# Patient Record
Sex: FEMALE | Race: BLACK OR AFRICAN AMERICAN | Employment: UNEMPLOYED | URBAN - METROPOLITAN AREA
[De-identification: names, ages, dates, MRNs, and addresses within clinical notes are randomized per-mention and may not be internally consistent; named-entity substitution may affect disease eponyms.]

---

## 2017-01-26 ENCOUNTER — OFFICE VISIT (OUTPATIENT)
Dept: INTERNAL MEDICINE CLINIC | Facility: CLINIC | Age: 57
End: 2017-01-26

## 2017-01-26 VITALS
RESPIRATION RATE: 18 BRPM | WEIGHT: 183.3 LBS | HEART RATE: 88 BPM | OXYGEN SATURATION: 98 % | HEIGHT: 68 IN | SYSTOLIC BLOOD PRESSURE: 108 MMHG | DIASTOLIC BLOOD PRESSURE: 77 MMHG | TEMPERATURE: 98.4 F | BODY MASS INDEX: 27.78 KG/M2

## 2017-01-26 DIAGNOSIS — E66.3 OVERWEIGHT (BMI 25.0-29.9): ICD-10-CM

## 2017-01-26 DIAGNOSIS — Z11.59 NEED FOR HEPATITIS C SCREENING TEST: ICD-10-CM

## 2017-01-26 DIAGNOSIS — E78.00 HYPERCHOLESTEROLEMIA: ICD-10-CM

## 2017-01-26 DIAGNOSIS — I10 ESSENTIAL HYPERTENSION WITH GOAL BLOOD PRESSURE LESS THAN 140/90: Primary | ICD-10-CM

## 2017-01-26 DIAGNOSIS — D86.9 SARCOID: ICD-10-CM

## 2017-01-26 DIAGNOSIS — Z23 ENCOUNTER FOR IMMUNIZATION: ICD-10-CM

## 2017-01-26 NOTE — PROGRESS NOTES
Gabriella Thompson  is a 64 y.o.  female  who present for TDAP immunizations/injections. He/she denies any symptoms , reactions or allergies that would exclude them from being immunized today. Risks and adverse reactions were discussed and the VIS was given if applicable to them. All questions were addressed. He/She was observed for 5 min post injection. There were no reactions observed.     Behzad Whelan LPN

## 2017-01-26 NOTE — MR AVS SNAPSHOT
Visit Information Date & Time Provider Department Dept. Phone Encounter #  
 1/26/2017 10:30 AM María Quick PA-C [facility] [phone] [id number] Follow-up Instructions Return in about 6 months (around 7/26/2017) for Hypertension, Weight follow up. Upcoming Health Maintenance Date Due Hepatitis C Screening 1960 DTaP/Tdap/Td series (1 - Tdap) 4/27/1981 BREAST CANCER SCRN MAMMOGRAM 4/27/2010 FOBT Q 1 YEAR AGE 50-75 4/27/2010 PAP AKA CERVICAL CYTOLOGY 12/8/2019 Allergies as of 1/26/2017  Review Complete On: 1/26/2017 By: María Quick PA-C Severity Noted Reaction Type Reactions Latex  06/21/2010    Rash Iodine  06/21/2010    Rash Lisinopril  08/09/2016    Cough Current Immunizations  Never Reviewed Name Date Tdap  Incomplete Not reviewed this visit You Were Diagnosed With   
  
 Codes Comments Essential hypertension with goal blood pressure less than 140/90    -  Primary ICD-10-CM: I10 
ICD-9-CM: 401.9 Sarcoid ([address]     ICD-10-CM: D86.9 ICD-9-CM: 135 Hypercholesterolemia     ICD-10-CM: E78.00 ICD-9-CM: 272.0 Overweight (BMI 25.0-29. 9)     ICD-10-CM: D79.5 ICD-9-CM: 278.02 Need for hepatitis C screening test     ICD-10-CM: Z11.59 
ICD-9-CM: V73.89 Encounter for immunization     ICD-10-CM: U71 ICD-9-CM: V03.89 Vitals BP Pulse Temp Resp Height(growth percentile) Weight(growth percentile) 108/77 (BP 1 Location: Left arm, BP Patient Position: Sitting) 88 98.4 °F (36.9 °C) (Oral) 18 5' 8\" (1.727 m) 183 lb 4.8 oz (83.1 kg) SpO2 BMI OB Status Smoking Status 98% 27.87 kg/m2 Hysterectomy Never Smoker Vitals History BMI and BSA Data Body Mass Index Body Surface Area  
 27.87 kg/m 2 2 m 2 Preferred Pharmacy Pharmacy Name Phone [id number] [place], [place] - [address] [facility] Yimi Lewis [phone] Your Updated Medication List  
  
   
This list is accurate as of: 1/26/17 10:40 AM.  Always use your most recent med list.  
  
  
  
  
 albuterol 90 mcg/actuation inhaler Commonly known as:  PROVENTIL HFA, VENTOLIN HFA, PROAIR HFA Take  by inhalation. amLODIPine 5 mg tablet Commonly known as:  Bk Abrams Take 1 Tab by mouth daily. Indications: Hypertension CENTRUM SILVER PO Take  by mouth. * Cetirizine 10 mg Cap Take  by mouth. * ZyrTEC 10 mg Cap Generic drug:  Cetirizine Take  by mouth. cholecalciferol (VITAMIN D3) 5,000 unit Tab tablet Commonly known as:  VITAMIN D3 Take  by mouth daily. dextroamphetamine-amphetamine 20 mg tablet Commonly known as:  ADDERALL Take 20 mg by mouth two (2) times a day. meloxicam 15 mg tablet Commonly known as:  MOBIC  
  
 OMEGA 3 PO Take  by mouth. OTHER  
400 mg. Indications: magnesium complex * Notice: This list has 2 medication(s) that are the same as other medications prescribed for you. Read the directions carefully, and ask your doctor or other care provider to review them with you. We Performed the Following HCV AB W/RFLX TO ROGELIO [62490 CPT(R)] LIPID PANEL [30495 CPT(R)] METABOLIC PANEL, COMPREHENSIVE [71861 CPT(R)] REFERRAL TO PULMONARY DISEASE [YVO59 Custom] Comments:  
 Evangelina Glez MD 
[facility] [address], [address] [place] [address] Road Phone: [phone] Fax: [phone] TETANUS, DIPHTHERIA TOXOIDS AND ACELLULAR PERTUSSIS VACCINE (TDAP), IN INDIVIDS. >=7, IM [id number] CPT(R)] THYROID CASCADE PROFILE [ZVW04119 Custom] Follow-up Instructions Return in about 6 months (around 7/26/2017) for Hypertension, Weight follow up. Referral Information  Referral ID Referred By Referred To  
  
 [id number] Esperanza MENJIVAR [facility].   
 [address] [facility] 101 [facility], 40 [facility] Visits Status Start Date End Date 1 New Request 1/26/17 1/26/18 If your referral has a status of pending review or denied, additional information will be sent to support the outcome of this decision. Introducing [facility]! John Saucedo introduces Stem patient portal. Now you can access parts of your medical record, email your doctor's office, and request medication refills online. 1. In your internet browser, go to [email or web address] 2. Click on the First Time User? Click Here link in the Sign In box. You will see the New Member Sign Up page. 3. Enter your Stem Access Code exactly as it appears below. You will not need to use this code after youve completed the sign-up process. If you do not sign up before the expiration date, you must request a new code. · Stem Access Code: [email or web address] Expires: 2/18/2017  3:36 PM 
 
4. Enter the last four digits of your Social Security Number (xxxx) and Date of Birth (mm/dd/yyyy) as indicated and click Submit. You will be taken to the next sign-up page. 5. Create a Stem ID. This will be your Stem login ID and cannot be changed, so think of one that is secure and easy to remember. 6. Create a Stem password. You can change your password at any time. 7. Enter your Password Reset Question and Answer. This can be used at a later time if you forget your password. 8. Enter your e-mail address. You will receive e-mail notification when new information is available in [address] 9. Click Sign Up. You can now view and download portions of your medical record. 10. Click the Download Summary menu link to download a portable copy of your medical information. If you have questions, please visit the Frequently Asked Questions section of the Stem website. Remember, Stem is NOT to be used for urgent needs. For medical emergencies, dial 911. Now available from your iPhone and Android! Please provide this summary of care documentation to your next provider. Your primary care clinician is listed as Lucia Bateman. If you have any questions after today's visit, please call [phone].

## 2017-01-26 NOTE — PROGRESS NOTES
HISTORY OF PRESENT ILLNESS  Jenni Osborne is a 64 y.o. female. HPI   1) HTN - taking Amlodipine regularly. Has lost weight! Working on diet - trying to decrease calories in diet. Wt Readings from Last 3 Encounters:   01/26/17 183 lb 4.8 oz (83.1 kg)   12/08/16 188 lb (85.3 kg)   11/22/16 184 lb (83.5 kg)     Trying to decrease stress. Hip pain has improved after the injection 12/16. Sleeping on a heating pad most nights since 11/16. Ortho has referred pt to PT - pt has been doing exercises (most days) at home instead as this is more convenient, and they seem to help. Taking meloxicam for back pain and it helps. Checking BP at home occasionally. Always below 130/80    2) Trying to qualify for Federal Disability because of Sarcoidosis and ADD. Seeing Psychiatrist for ADD. Not seeing Pulmonology for Sarcoidosis, and has not had routine testing in years. Review of Systems   Constitutional: Negative for malaise/fatigue. Cardiovascular: Negative for chest pain and palpitations. Neurological: Positive for dizziness. Negative for loss of consciousness. Once this week when standing too quickly. Physical Exam   Constitutional: She is oriented to person, place, and time. She appears well-developed and well-nourished. No distress. HENT:   Head: Normocephalic and atraumatic. Neck: Neck supple. No JVD present. Cardiovascular: Normal rate, regular rhythm and normal heart sounds. Pulmonary/Chest: Effort normal and breath sounds normal. No respiratory distress. Musculoskeletal: She exhibits no edema. Neurological: She is alert and oriented to person, place, and time. Skin: Skin is warm and dry. Psychiatric: She has a normal mood and affect. Her behavior is normal. Judgment and thought content normal.   Nursing note and vitals reviewed. ASSESSMENT and PLAN    ICD-10-CM ICD-9-CM    1. Essential hypertension with goal blood pressure less than 140/90 I10 401.9 Controlled!  Continue Amlodipine. We may be able to stop Amlodipine if pt continues losing weight. Advised her to call if dizzy episodes become frequent. METABOLIC PANEL, COMPREHENSIVE   2. Sarcoid ([address] D86.9 135 REFERRAL TO PULMONARY DISEASE   3. Hypercholesterolemia E78.00 272.0 Pt doesn't think she would ever agree to take a statin, but will think about it, as we await lab results. LIPID PANEL      THYROID CASCADE PROFILE   4. Overweight (BMI 25.0-29. 9) E66.3 278.02 Improved! 5. Need for hepatitis C screening test Z11.59 V73.89 HCV AB W/RFLX TO ROGELIO   6.  Encounter for immunization Z23 V03.89 TETANUS, DIPHTHERIA TOXOIDS AND ACELLULAR PERTUSSIS VACCINE (TDAP), IN INDIVIDS. >=7, IM

## 2017-01-26 NOTE — PROGRESS NOTES
Room 7    Chief Complaint   Patient presents with    Blood Pressure Check     To follow up with blood pressure medication and back pain     1. Have you been to the ER, urgent care clinic since your last visit? Hospitalized since your last visit? No    2. Have you seen or consulted any other health care providers outside of the [address] [place] [address] since your last visit? Include any pap smears or colon screening. No     Health Maintenance Due   Topic Date Due    Hepatitis C Screening  1960    DTaP/Tdap/Td series (1 - Tdap) 04/27/1981    BREAST CANCER SCRN MAMMOGRAM  04/27/2010    FOBT Q 1 YEAR AGE 50-75  04/27/2010     Advance directives reviewed.  Patient received information previously

## 2017-01-27 LAB
ALBUMIN SERPL-MCNC: 4.8 G/DL (ref 3.5–5.5)
ALBUMIN/GLOB SERPL: 1.8 {RATIO} (ref 1.1–2.5)
ALP SERPL-CCNC: 110 IU/L (ref 39–117)
ALT SERPL-CCNC: 8 IU/L (ref 0–32)
AST SERPL-CCNC: 19 IU/L (ref 0–40)
BILIRUB SERPL-MCNC: 0.5 MG/DL (ref 0–1.2)
BUN SERPL-MCNC: 17 MG/DL (ref 6–24)
BUN/CREAT SERPL: 20 (ref 9–23)
CALCIUM SERPL-MCNC: 9.8 MG/DL (ref 8.7–10.2)
CHLORIDE SERPL-SCNC: 96 MMOL/L (ref 96–106)
CHOLEST SERPL-MCNC: 249 MG/DL (ref 100–199)
CO2 SERPL-SCNC: 25 MMOL/L (ref 18–29)
CREAT SERPL-MCNC: 0.85 MG/DL (ref 0.57–1)
GLOBULIN SER CALC-MCNC: 2.6 G/DL (ref 1.5–4.5)
GLUCOSE SERPL-MCNC: 62 MG/DL (ref 65–99)
HCV AB S/CO SERPL IA: <0.1 S/CO RATIO (ref 0–0.9)
HCV AB SERPL QL IA: NORMAL
HDLC SERPL-MCNC: 53 MG/DL
LDLC SERPL CALC-MCNC: 170 MG/DL (ref 0–99)
POTASSIUM SERPL-SCNC: 4.8 MMOL/L (ref 3.5–5.2)
PROT SERPL-MCNC: 7.4 G/DL (ref 6–8.5)
SODIUM SERPL-SCNC: 139 MMOL/L (ref 134–144)
TRIGL SERPL-MCNC: 131 MG/DL (ref 0–149)
TSH SERPL DL<=0.005 MIU/L-ACNC: 0.53 UIU/ML (ref 0.45–4.5)
VLDLC SERPL CALC-MCNC: 26 MG/DL (ref 5–40)

## 2017-01-30 DIAGNOSIS — E78.00 HYPERCHOLESTEROLEMIA: Primary | ICD-10-CM

## 2017-01-30 RX ORDER — SIMVASTATIN 20 MG/1
20 TABLET, FILM COATED ORAL
Qty: 30 TAB | Refills: 2 | Status: SHIPPED | OUTPATIENT
Start: 2017-01-30 | End: 2017-05-04 | Stop reason: SDUPTHER

## 2017-04-20 ENCOUNTER — OFFICE VISIT (OUTPATIENT)
Dept: INTERNAL MEDICINE CLINIC | Facility: CLINIC | Age: 57
End: 2017-04-20

## 2017-04-20 VITALS
SYSTOLIC BLOOD PRESSURE: 141 MMHG | HEART RATE: 62 BPM | HEIGHT: 68 IN | BODY MASS INDEX: 27.58 KG/M2 | OXYGEN SATURATION: 100 % | WEIGHT: 182 LBS | TEMPERATURE: 97.6 F | DIASTOLIC BLOOD PRESSURE: 83 MMHG | RESPIRATION RATE: 18 BRPM

## 2017-04-20 DIAGNOSIS — Z12.11 SCREEN FOR COLON CANCER: ICD-10-CM

## 2017-04-20 DIAGNOSIS — Z00.00 ANNUAL PHYSICAL EXAM: Primary | ICD-10-CM

## 2017-04-20 DIAGNOSIS — E78.00 HYPERCHOLESTEROLEMIA: ICD-10-CM

## 2017-04-20 DIAGNOSIS — I10 ESSENTIAL HYPERTENSION WITH GOAL BLOOD PRESSURE LESS THAN 140/90: ICD-10-CM

## 2017-04-20 NOTE — PROGRESS NOTES
HISTORY OF PRESENT ILLNESS  Sergio Hays is a 64 y.o. female. HPI   1) CE today - has lost weight. Getting more steps in and stretching regularly. Wt Readings from Last 3 Encounters:   04/20/17 182 lb (82.6 kg)   01/26/17 183 lb 4.8 oz (83.1 kg)   12/08/16 188 lb (85.3 kg)     2) Hypertension - Didn't take BP medication this morning. BP at home 130-138/78-80. Sometimes BP goes up when pt's hip pain worsens: 142/86. Missing BP medication less than 1x/week. BP Readings from Last 3 Encounters:   04/20/17 141/83   01/26/17 108/77   12/08/16 138/84     3) Hyperchol - Started taking statin, but not consistently because night time dosing is not convenient. Lab Results   Component Value Date/Time    Cholesterol, total 249 01/26/2017 10:29 AM    HDL Cholesterol 53 01/26/2017 10:29 AM    LDL, calculated 170 01/26/2017 10:29 AM    VLDL, calculated 26 01/26/2017 10:29 AM    Triglyceride 131 01/26/2017 10:29 AM       Review of Systems   Constitutional: Negative for fever and weight loss. HENT: Positive for congestion. Negative for hearing loss and sore throat. Eyes: Positive for discharge. Negative for blurred vision. Respiratory: Positive for cough and shortness of breath. Cough/SOB - hx sarcoidosis - doesn't want to have this evaluated with pulm. Cardiovascular: Negative for chest pain, palpitations and leg swelling. Gastrointestinal: Positive for abdominal pain. Negative for blood in stool, constipation, diarrhea, heartburn, melena, nausea and vomiting. Stomach ache after eating too much sugar this week. Genitourinary: Negative for dysuria, frequency, hematuria and urgency. Musculoskeletal: Positive for back pain and joint pain. Negative for neck pain. Neurological: Positive for dizziness. Negative for seizures, loss of consciousness, weakness and headaches. Mildly dizzy once in the past 1-2 months lasting 1-2 seconds.     Endo/Heme/Allergies: Positive for environmental allergies. Psychiatric/Behavioral: Positive for depression. Negative for substance abuse and suicidal ideas. The patient is nervous/anxious. Physical Exam   Constitutional: She is oriented to person, place, and time. She appears well-developed and well-nourished. No distress. HENT:   Head: Normocephalic and atraumatic. Right Ear: External ear normal.   Left Ear: External ear normal.   Nose: Nose normal.   Mouth/Throat: Oropharynx is clear and moist.   Eyes: Conjunctivae are normal.   Neck: Neck supple. No JVD present. No thyromegaly present. Cardiovascular: Normal rate, regular rhythm and normal heart sounds. Pulmonary/Chest: Effort normal and breath sounds normal. No respiratory distress. Breast symmetrical B without mass, tenderness, or discharge. No lymph enlargement in B underarms. Abdominal: Soft. Bowel sounds are normal. She exhibits no distension and no mass. There is no tenderness. There is no rebound and no guarding. Genitourinary:   Genitourinary Comments: No hemorrhoids or rectal masses. Stool is brown. Rectal tone WNL. FOBT sent. Musculoskeletal: She exhibits no edema. Lymphadenopathy:     She has no cervical adenopathy. Neurological: She is alert and oriented to person, place, and time. Skin: Skin is warm and dry. Psychiatric: She has a normal mood and affect. Her behavior is normal. Judgment and thought content normal.   Nursing note and vitals reviewed. ASSESSMENT and PLAN    ICD-10-CM ICD-9-CM    1. Annual physical exam Z00.00 V70.0 Congratulated pt on weight loss! 2. Screen for colon cancer Z12.11 V76.51 REFERRAL TO GASTROENTEROLOGY      OCCULT BLOOD, IMMUNOASSAY (FIT)   3. Hypercholesterolemia E78.00 272.0 Follow up with recheck in 3 months after taking statin consistently. 4. Essential hypertension with goal blood pressure less than 140/90 I10 401.9 Well controlled at home. Monitor regularly and follow up in 3 months.  Emphasized importance of taking BP medication before coming in to the office. Pt notes understanding.

## 2017-04-20 NOTE — ACP (ADVANCE CARE PLANNING)
Advance Care Planning (ACP) Provider Note - Comprehensive     Date of ACP Conversation: 04/20/17  Persons included in Conversation:  patient  Length of ACP Conversation in minutes:  <16 minutes (Non-Billable)    Authorized Decision Maker (if patient is incapable of making informed decisions): This person is:  Rachid Amin Rashad           General ACP for ALL Patients with Decision Making Capacity:   Importance of advance care planning, including choosing a healthcare agent to communicate patient's healthcare decisions if patient lost the ability to make decisions, such as after a sudden illness or accident  Understanding of the healthcare agent role was assessed and information provided  Exploration of values, goals, and preferences if recovery is not expected, even with continued medical treatment in the event of: Imminent death  Severe, permanent brain injury  \"In these circumstances, what matters most to you? \"  Care focused more on comfort or quality of life. Review of Existing Advance Directive:  What is your understanding of your agent's willingness to honor your wishes, even if he/she may not agree with them? Pt believes that her son would honor her wishes. For Serious or Chronic Illness:  Understanding of CPR, goals and expected outcomes, benefits and burdens discussed.     Interventions Provided:  Recommended completion of Advance Directive form after review of ACP materials and conversation with prospective healthcare agent

## 2017-04-20 NOTE — MR AVS SNAPSHOT
Visit Information Date & Time Provider Department Dept. Phone Encounter #  
 4/20/2017  9:30 AM Elizabet Mendieta PA-C [facility] [phone] [id number] Follow-up Instructions Return in about 3 months (around 7/20/2017) for Follow up Cholesterol & HTN. Also schedule ACP with Juanita. Upcoming Health Maintenance Date Due  
 BREAST CANCER SCRN MAMMOGRAM 4/27/2010 FOBT Q 1 YEAR AGE 50-75 4/27/2010 PAP AKA CERVICAL CYTOLOGY 12/8/2019 DTaP/Tdap/Td series (2 - Td) 1/26/2027 Allergies as of 4/20/2017  Review Complete On: 4/20/2017 By: Elizabet Mendieta PA-C Severity Noted Reaction Type Reactions Latex  06/21/2010    Rash Iodine  06/21/2010    Rash Lisinopril  08/09/2016    Cough Current Immunizations  Never Reviewed Name Date Tdap 1/26/2017 Not reviewed this visit You Were Diagnosed With   
  
 Codes Comments Annual physical exam    -  Primary ICD-10-CM: Z00.00 ICD-9-CM: V70.0 Screen for colon cancer     ICD-10-CM: Z12.11 ICD-9-CM: V76.51 Hypercholesterolemia     ICD-10-CM: E78.00 ICD-9-CM: 272.0 Essential hypertension with goal blood pressure less than 140/90     ICD-10-CM: I10 
ICD-9-CM: 401.9 Vitals BP Pulse Temp Resp Height(growth percentile) Weight(growth percentile) 141/83 (BP 1 Location: Left arm, BP Patient Position: Sitting) 62 97.6 °F (36.4 °C) (Oral) 18 5' 8\" (1.727 m) 182 lb (82.6 kg) SpO2 BMI OB Status Smoking Status 100% 27.67 kg/m2 Hysterectomy Never Smoker Vitals History BMI and BSA Data Body Mass Index Body Surface Area  
 27.67 kg/m 2 1.99 m 2 Preferred Pharmacy Pharmacy Name Phone [id number], [place] - [address] [phone] Your Updated Medication List  
  
   
This list is accurate as of: 4/20/17 10:16 AM.  Always use your most recent med list.  
  
  
  
  
 albuterol 90 mcg/actuation inhaler Commonly known as:  PROVENTIL HFA, VENTOLIN HFA, PROAIR HFA Take  by inhalation. amLODIPine 5 mg tablet Commonly known as:  Benjiman Floro Take 1 Tab by mouth daily. Indications: Hypertension CENTRUM SILVER PO Take  by mouth. * Cetirizine 10 mg Cap Take  by mouth. * ZyrTEC 10 mg Cap Generic drug:  Cetirizine Take  by mouth. cholecalciferol (VITAMIN D3) 5,000 unit Tab tablet Commonly known as:  VITAMIN D3 Take  by mouth daily. dextroamphetamine-amphetamine 20 mg tablet Commonly known as:  ADDERALL Take 20 mg by mouth two (2) times a day. meloxicam 15 mg tablet Commonly known as:  MOBIC  
  
 OMEGA 3 PO Take  by mouth. OTHER  
400 mg. Indications: magnesium complex  
  
 simvastatin 20 mg tablet Commonly known as:  ZOCOR Take 1 Tab by mouth nightly. * Notice: This list has 2 medication(s) that are the same as other medications prescribed for you. Read the directions carefully, and ask your doctor or other care provider to review them with you. We Performed the Following OCCULT BLOOD, IMMUNOASSAY (FIT) [id number] CPT(R)] REFERRAL TO GASTROENTEROLOGY [BGN08 Custom] Comments:  
 Dr. Britney Ferreira [address] [facility], [address] 
[phone] 
 
or Dr. Fei Pinedo 217 [facility] [place], [place] 
[phone] 
(will do pill version of colonoscopy prep) Follow-up Instructions Return in about 3 months (around 7/20/2017) for Follow up Cholesterol & HTN. Also schedule ACP with Juanita. Referral Information Referral ID Referred By Referred To  
  
 [id number] [facility] [address] [facility], [address] Visits Status Start Date End Date 1 New Request 4/20/17 4/20/18  If your referral has a status of pending review or denied, additional information will be sent to support the outcome of this decision. Introducing [facility]! [facility] introduces [employer] patient portal. Now you can access parts of your medical record, email your doctor's office, and request medication refills online. 1. In your internet browser, go to [email or web address] 2. Click on the First Time User? Click Here link in the Sign In box. You will see the New Member Sign Up page. 3. Enter your SavingStar Access Code exactly as it appears below. You will not need to use this code after youve completed the sign-up process. If you do not sign up before the expiration date, you must request a new code. · SavingStar Access Code: [email or web address] Expires: 7/19/2017  9:32 AM 
 
4. Enter the last four digits of your Social Security Number (xxxx) and Date of Birth (mm/dd/yyyy) as indicated and click Submit. You will be taken to the next sign-up page. 5. Create a SavingStar ID. This will be your SavingStar login ID and cannot be changed, so think of one that is secure and easy to remember. 6. Create a SavingStar password. You can change your password at any time. 7. Enter your Password Reset Question and Answer. This can be used at a later time if you forget your password. 8. Enter your e-mail address. You will receive e-mail notification when new information is available in [address] 9. Click Sign Up. You can now view and download portions of your medical record. 10. Click the Download Summary menu link to download a portable copy of your medical information. If you have questions, please visit the Frequently Asked Questions section of the SavingStar website. Remember, SavingStar is NOT to be used for urgent needs. For medical emergencies, dial 911. Now available from your iPhone and Android! Please provide this summary of care documentation to your next provider. Your primary care clinician is listed as Shani Sharma.  If you have any questions after today's visit, please call [phone].

## 2017-04-21 LAB — HEMOCCULT STL QL IA: NEGATIVE

## 2017-05-02 DIAGNOSIS — E78.00 HYPERCHOLESTEROLEMIA: ICD-10-CM

## 2017-05-04 DIAGNOSIS — E78.00 HYPERCHOLESTEROLEMIA: ICD-10-CM

## 2017-05-04 RX ORDER — SIMVASTATIN 20 MG/1
TABLET, FILM COATED ORAL
Qty: 30 TAB | Refills: 1 | Status: SHIPPED | OUTPATIENT
Start: 2017-05-04 | End: 2017-08-06 | Stop reason: SDUPTHER

## 2017-06-22 DIAGNOSIS — Z12.39 SCREENING FOR BREAST CANCER: Primary | ICD-10-CM

## 2017-07-13 ENCOUNTER — HOSPITAL ENCOUNTER (OUTPATIENT)
Dept: MAMMOGRAPHY | Age: 57
Discharge: HOME | End: 2017-07-13
Attending: PHYSICIAN ASSISTANT
Payer: COMMERCIAL

## 2017-07-13 DIAGNOSIS — Z12.39 SCREENING FOR BREAST CANCER: ICD-10-CM

## 2017-07-13 PROCEDURE — 77067 SCR MAMMO BI INCL CAD: CPT

## 2017-07-20 ENCOUNTER — OFFICE VISIT (OUTPATIENT)
Dept: INTERNAL MEDICINE CLINIC | Facility: CLINIC | Age: 57
End: 2017-07-20

## 2017-07-20 VITALS
WEIGHT: 183 LBS | DIASTOLIC BLOOD PRESSURE: 73 MMHG | BODY MASS INDEX: 27.74 KG/M2 | RESPIRATION RATE: 18 BRPM | HEIGHT: 68 IN | OXYGEN SATURATION: 97 % | HEART RATE: 79 BPM | TEMPERATURE: 97.2 F | SYSTOLIC BLOOD PRESSURE: 116 MMHG

## 2017-07-20 DIAGNOSIS — J30.2 SEASONAL ALLERGIC RHINITIS, UNSPECIFIED ALLERGIC RHINITIS TRIGGER: ICD-10-CM

## 2017-07-20 DIAGNOSIS — E78.00 HYPERCHOLESTEROLEMIA: ICD-10-CM

## 2017-07-20 DIAGNOSIS — L30.9 ECZEMA, UNSPECIFIED TYPE: ICD-10-CM

## 2017-07-20 DIAGNOSIS — J45.20 MILD INTERMITTENT ASTHMA WITHOUT COMPLICATION: Primary | ICD-10-CM

## 2017-07-20 DIAGNOSIS — I10 ESSENTIAL HYPERTENSION WITH GOAL BLOOD PRESSURE LESS THAN 140/90: ICD-10-CM

## 2017-07-20 RX ORDER — METHYLPREDNISOLONE 4 MG/1
TABLET ORAL
Qty: 1 DOSE PACK | Refills: 0 | Status: SHIPPED | OUTPATIENT
Start: 2017-07-20 | End: 2017-11-28

## 2017-07-20 RX ORDER — BUPROPION HYDROCHLORIDE 150 MG/1
TABLET ORAL
COMMUNITY
Start: 2017-07-13 | End: 2019-03-11

## 2017-07-20 NOTE — PROGRESS NOTES
Room 7    Chief Complaint   Patient presents with    Cholesterol Problem     Follow up. Patient is not fasting.  Results     Mammogram results. Done last Thursday     1. Have you been to the ER, urgent care clinic since your last visit? Hospitalized since your last visit? No    2. Have you seen or consulted any other health care providers outside of the [facility] since your last visit? Include any pap smears or colon screening.  No     No HM due

## 2017-07-20 NOTE — PROGRESS NOTES
HISTORY OF PRESENT ILLNESS  Tess Tapia is a 62 y.o. female. Chief Complaint   Patient presents with    Cholesterol Problem     Follow up. Patient is not fasting.  Results     Mammogram results. Done last Thursday     HPI  1) Rash B arms - started after having seafood. This has happened before. Not taking Zyrtec regularly. 2) Had sinus infection when returning from [place]. Infection resolved, but nasal congestion persists. 3) Asthma - needing to take albuterol 4x/day recently. Normally not needing to take Albuterol at all. 4) Mammogram normal last week. Reviewed results with pt. 5) Due for follow up cholesterol labs. Not fasting today, but will go to [facility]. Taking statin regularly. Review of Systems   Constitutional: Negative for fever. HENT: Positive for congestion. Negative for ear pain and sore throat. Eyes: Positive for discharge and redness. Respiratory: Positive for cough and shortness of breath. Skin: Positive for rash. Neurological: Positive for headaches. Endo/Heme/Allergies: Positive for environmental allergies. Physical Exam   Constitutional: She is oriented to person, place, and time. She appears well-developed and well-nourished. No distress. HENT:   Head: Normocephalic and atraumatic. Mouth/Throat: Oropharynx is clear and moist.   Bilateral TMs with fluid. No erythema. Nasal mucosa pale, inflamed. Eyes: Conjunctivae are normal.   Neck: Neck supple. Cardiovascular: Normal rate, regular rhythm and normal heart sounds. Pulmonary/Chest: Effort normal and breath sounds normal.   Lymphadenopathy:     She has no cervical adenopathy. Neurological: She is alert and oriented to person, place, and time. Skin: Skin is warm and dry. Nursing note and vitals reviewed.       ASSESSMENT and PLAN    ICD-10-CM ICD-9-CM    1. Mild intermittent asthma without complication H68.76 542.89 Cetirizine (ZYRTEC) 10 mg cap      methylPREDNISolone (MEDROL DOSEPACK) 4 mg tablet   2. Seasonal allergic rhinitis, unspecified allergic rhinitis trigger J30.2 477.9 Cetirizine (ZYRTEC) 10 mg cap      methylPREDNISolone (MEDROL DOSEPACK) 4 mg tablet   3. Eczema, unspecified type L30.9 692.9 Cetirizine (ZYRTEC) 10 mg cap      methylPREDNISolone (MEDROL DOSEPACK) 4 mg tablet   4. Hypercholesterolemia E78.00 272.0 Lab order given   5. Essential hypertension with goal blood pressure less than 140/90 I10 401.9 Well controlled.

## 2017-07-20 NOTE — MR AVS SNAPSHOT
Visit Information Date & Time Provider Department Dept. Phone Encounter #  
 7/20/2017  9:00 AM Steven George, [address] Internal Medicine [phone] [id number] Upcoming Health Maintenance Date Due INFLUENZA AGE 9 TO ADULT 8/1/2017 FOBT Q 1 YEAR AGE 50-75 4/20/2018 BREAST CANCER SCRN MAMMOGRAM 7/13/2019 PAP AKA CERVICAL CYTOLOGY 12/8/2019 DTaP/Tdap/Td series (2 - Td) 1/26/2027 Allergies as of 7/20/2017  Review Complete On: 7/20/2017 By: Steven George PA-C Severity Noted Reaction Type Reactions Latex  06/21/2010    Rash Iodine  06/21/2010    Rash Lisinopril  08/09/2016    Cough Current Immunizations  Never Reviewed Name Date Tdap 1/26/2017 Not reviewed this visit You Were Diagnosed With   
  
 Codes Comments Mild intermittent asthma without complication    -  Primary ICD-10-CM: J45.20 ICD-9-CM: 493.90 Seasonal allergic rhinitis, unspecified allergic rhinitis trigger     ICD-10-CM: J30.2 ICD-9-CM: 477.9 Eczema, unspecified type     ICD-10-CM: L30.9 ICD-9-CM: 692.9 Vitals BP Pulse Temp Resp Height(growth percentile) Weight(growth percentile) 116/73 (BP 1 Location: Left arm, BP Patient Position: Sitting) 79 97.2 °F (36.2 °C) (Oral) 18 5' 8\" (1.727 m) 183 lb (83 kg) SpO2 BMI OB Status Smoking Status 97% 27.83 kg/m2 Hysterectomy Never Smoker Vitals History BMI and BSA Data Body Mass Index Body Surface Area  
 27.83 kg/m 2 2 m 2 Preferred Pharmacy Pharmacy Name Phone [facility]/PHARMACY #[id number]- [place], [place] - [address] [phone] Your Updated Medication List  
  
   
This list is accurate as of: 7/20/17  9:30 AM.  Always use your most recent med list.  
  
  
  
  
 albuterol 90 mcg/actuation inhaler Commonly known as:  PROVENTIL HFA, VENTOLIN HFA, PROAIR HFA Take  by inhalation. amLODIPine 5 mg tablet Commonly known as:  Jovanni Rocky TAKE 1 TABLET EVERY DAY FOR HYPERTENSION  
  
 buPROPion  mg tablet Commonly known as:  WELLBUTRIN XL  
  
 CENTRUM SILVER PO Take  by mouth. Cetirizine 10 mg Cap Commonly known as:  ZyrTEC Take 10 mg by mouth nightly. cholecalciferol (VITAMIN D3) 5,000 unit Tab tablet Commonly known as:  VITAMIN D3 Take  by mouth daily. dextroamphetamine-amphetamine 20 mg tablet Commonly known as:  ADDERALL Take 20 mg by mouth two (2) times a day. meloxicam 15 mg tablet Commonly known as:  MOBIC  
  
 methylPREDNISolone 4 mg tablet Commonly known as:  Gerline Don Take as directed OMEGA 3 PO Take  by mouth. OTHER  
400 mg. Indications: magnesium complex  
  
 simvastatin 20 mg tablet Commonly known as:  ZOCOR  
TAKE 1 TABLET BY MOUTH NIGHTLY Prescriptions Sent to Pharmacy Refills Cetirizine (ZYRTEC) 10 mg cap 3 Sig: Take 10 mg by mouth nightly. Class: Normal  
 Pharmacy: [facility]/pharmacy [facility] [address] Ph #: [phone] Route: Oral  
 methylPREDNISolone (MEDROL DOSEPACK) 4 mg tablet 0 Sig: Take as directed Class: Normal  
 Pharmacy: [facility]/pharmacy [facility] [address] Ph #: [phone] Introducing [facility] & HEALTH SERVICES! [facility] introduces [employer] patient portal. Now you can access parts of your medical record, email your doctor's office, and request medication refills online. 1. In your internet browser, go to [email or web address] 2. Click on the First Time User? Click Here link in the Sign In box. You will see the New Member Sign Up page. 3. Enter your [employer] Access Code exactly as it appears below. You will not need to use this code after youve completed the sign-up process.  If you do not sign up before the expiration date, you must request a new code. · [employer] Access Code: [email or web address] Expires: 10/18/2017  8:59 AM 
 
4. Enter the last four digits of your Social Security Number (xxxx) and Date of Birth (mm/dd/yyyy) as indicated and click Submit. You will be taken to the next sign-up page. 5. Create a [employer] ID. This will be your [employer] login ID and cannot be changed, so think of one that is secure and easy to remember. 6. Create a [employer] password. You can change your password at any time. 7. Enter your Password Reset Question and Answer. This can be used at a later time if you forget your password. 8. Enter your e-mail address. You will receive e-mail notification when new information is available in [address] 9. Click Sign Up. You can now view and download portions of your medical record. 10. Click the Download Summary menu link to download a portable copy of your medical information. If you have questions, please visit the Frequently Asked Questions section of the [employer] website. Remember, [employer] is NOT to be used for urgent needs. For medical emergencies, dial 911. Now available from your iPhone and Android! Please provide this summary of care documentation to your next provider. Your primary care clinician is listed as Keri Arana. If you have any questions after today's visit, please call [phone].

## 2017-08-02 PROBLEM — R74.8 ELEVATED ALKALINE PHOSPHATASE LEVEL: Status: ACTIVE | Noted: 2017-08-02

## 2017-08-02 LAB
ALBUMIN SERPL-MCNC: 4.8 G/DL (ref 3.5–5.5)
ALBUMIN/GLOB SERPL: 1.6 {RATIO} (ref 1.2–2.2)
ALP SERPL-CCNC: 127 IU/L (ref 39–117)
ALT SERPL-CCNC: 13 IU/L (ref 0–32)
AST SERPL-CCNC: 18 IU/L (ref 0–40)
BILIRUB SERPL-MCNC: 0.4 MG/DL (ref 0–1.2)
BUN SERPL-MCNC: 16 MG/DL (ref 6–24)
BUN/CREAT SERPL: 16 (ref 9–23)
CALCIUM SERPL-MCNC: 9.9 MG/DL (ref 8.7–10.2)
CHLORIDE SERPL-SCNC: 103 MMOL/L (ref 96–106)
CHOLEST SERPL-MCNC: 223 MG/DL (ref 100–199)
CO2 SERPL-SCNC: 29 MMOL/L (ref 18–29)
CREAT SERPL-MCNC: 0.98 MG/DL (ref 0.57–1)
GLOBULIN SER CALC-MCNC: 3 G/DL (ref 1.5–4.5)
GLUCOSE SERPL-MCNC: 92 MG/DL (ref 65–99)
HDLC SERPL-MCNC: 64 MG/DL
LDLC SERPL CALC-MCNC: 144 MG/DL (ref 0–99)
POTASSIUM SERPL-SCNC: 4.7 MMOL/L (ref 3.5–5.2)
PROT SERPL-MCNC: 7.8 G/DL (ref 6–8.5)
SODIUM SERPL-SCNC: 145 MMOL/L (ref 134–144)
TRIGL SERPL-MCNC: 74 MG/DL (ref 0–149)
VLDLC SERPL CALC-MCNC: 15 MG/DL (ref 5–40)

## 2017-08-03 DIAGNOSIS — E78.00 HYPERCHOLESTEROLEMIA: ICD-10-CM

## 2017-08-06 RX ORDER — SIMVASTATIN 20 MG/1
TABLET, FILM COATED ORAL
Qty: 30 TAB | Refills: 1 | Status: SHIPPED | COMMUNITY
Start: 2017-08-06 | End: 2017-10-05 | Stop reason: DRUGHIGH

## 2017-09-12 DIAGNOSIS — I10 ESSENTIAL HYPERTENSION WITH GOAL BLOOD PRESSURE LESS THAN 140/90: ICD-10-CM

## 2017-09-12 RX ORDER — AMLODIPINE BESYLATE 5 MG/1
TABLET ORAL
Qty: 30 TAB | Refills: 5 | Status: SHIPPED | OUTPATIENT
Start: 2017-09-12 | End: 2017-11-28 | Stop reason: DRUGHIGH

## 2017-11-28 ENCOUNTER — OFFICE VISIT (OUTPATIENT)
Dept: INTERNAL MEDICINE CLINIC | Facility: CLINIC | Age: 57
End: 2017-11-28

## 2017-11-28 VITALS
HEIGHT: 68 IN | WEIGHT: 190 LBS | RESPIRATION RATE: 18 BRPM | HEART RATE: 75 BPM | SYSTOLIC BLOOD PRESSURE: 124 MMHG | TEMPERATURE: 97.7 F | DIASTOLIC BLOOD PRESSURE: 76 MMHG | BODY MASS INDEX: 28.79 KG/M2

## 2017-11-28 DIAGNOSIS — E66.3 OVERWEIGHT (BMI 25.0-29.9): ICD-10-CM

## 2017-11-28 DIAGNOSIS — M06.9 RHEUMATOID ARTHRITIS, INVOLVING UNSPECIFIED SITE, UNSPECIFIED RHEUMATOID FACTOR PRESENCE: ICD-10-CM

## 2017-11-28 DIAGNOSIS — I10 ESSENTIAL HYPERTENSION WITH GOAL BLOOD PRESSURE LESS THAN 140/90: Primary | ICD-10-CM

## 2017-11-28 PROBLEM — F33.41 RECURRENT MAJOR DEPRESSIVE DISORDER, IN PARTIAL REMISSION (HCC): Status: ACTIVE | Noted: 2017-11-28

## 2017-11-28 RX ORDER — AMLODIPINE BESYLATE 10 MG/1
10 TABLET ORAL DAILY
Qty: 90 TAB | Refills: 1 | Status: SHIPPED | OUTPATIENT
Start: 2017-11-28 | End: 2018-05-21 | Stop reason: SDUPTHER

## 2017-11-28 NOTE — PROGRESS NOTES
HISTORY OF PRESENT ILLNESS  Valerie Lr is a 62 y.o. female. Chief Complaint   Patient presents with    Blood Pressure Check     noticed and elevated BP.several times (room 7)     HPI  1) BP at home 146/100, 132/100 last night and this morning. 136/86 once. Most of the time, diastolic has been over 033. Pt decided to doubled Norvasc dose (taking 10 mg now) x 1 week. Today, BP is controlled    2) Back pain/Hx RA - cold packs help. Pt is not currently interested in seeing Rheumatology. She takes Mobic occasionally with food and she is happy with this regimen. She     3) Overweight - Has been eating more holiday food after Thanksgiving. Planning to cut back on calories and increase activity level to aid in weight loss. Wt Readings from Last 3 Encounters:   11/28/17 190 lb (86.2 kg)   07/20/17 183 lb (83 kg)   04/20/17 182 lb (82.6 kg)     Review of Systems   Musculoskeletal: Positive for back pain, joint pain and myalgias. Negative for falls. Physical Exam   Constitutional: She is oriented to person, place, and time. She appears well-developed and well-nourished. No distress. Cardiovascular: Normal rate, regular rhythm and normal heart sounds. Pulmonary/Chest: Effort normal and breath sounds normal. No respiratory distress. Musculoskeletal: She exhibits no edema. Neurological: She is alert and oriented to person, place, and time. Skin: Skin is warm and dry. Psychiatric: She has a normal mood and affect. Her behavior is normal. Judgment and thought content normal.       ASSESSMENT and PLAN    ICD-10-CM ICD-9-CM    1. Essential hypertension with goal blood pressure less than 140/90 I10 401.9 Controlled today. Continue monitoring at home. Continue amLODIPine (NORVASC) 10 mg tablet   2. Rheumatoid arthritis, involving unspecified site, unspecified rheumatoid factor presence ([facility] M06.9 714.0 Pt declines Rheum referral for now. Agree that this is reasonable as her symptoms are minor at this time. Encouraged her to call if she changes her mind. 3. Overweight (BMI 25.0-29. 9) E66.3 278.02 Discussed diet/exercise.

## 2017-11-28 NOTE — MR AVS SNAPSHOT
Visit Information Date & Time Provider Department Dept. Phone Encounter #  
 11/28/2017  4:00 PM Jenni Brasher PA-C [facility] Internal Medicine [phone] [id number] Upcoming Health Maintenance Date Due FOBT Q 1 YEAR AGE 50-75 4/20/2018 BREAST CANCER SCRN MAMMOGRAM 7/13/2019 PAP AKA CERVICAL CYTOLOGY 12/8/2019 DTaP/Tdap/Td series (2 - Td) 1/26/2027 Allergies as of 11/28/2017  Review Complete On: 11/28/2017 By: Jenni Brasher PA-C Severity Noted Reaction Type Reactions Latex  06/21/2010    Rash Iodine  06/21/2010    Rash Lisinopril  08/09/2016    Cough Current Immunizations  Never Reviewed Name Date Tdap 1/26/2017 Not reviewed this visit You Were Diagnosed With   
  
 Codes Comments Essential hypertension with goal blood pressure less than 140/90    -  Primary ICD-10-CM: I10 
ICD-9-CM: 401.9 Rheumatoid arthritis, involving unspecified site, unspecified rheumatoid factor presence ([facility]     ICD-10-CM: M06.9 ICD-9-CM: 714.0 Overweight (BMI 25.0-29. 9)     ICD-10-CM: J29.9 ICD-9-CM: 278.02 Vitals BP Pulse Temp Resp Height(growth percentile) Weight(growth percentile) 124/76 75 97.7 °F (36.5 °C) (Oral) 18 5' 8\" (1.727 m) 190 lb (86.2 kg) BMI OB Status Smoking Status 28.89 kg/m2 Hysterectomy Never Smoker Vitals History BMI and BSA Data Body Mass Index Body Surface Area  
 28.89 kg/m 2 2.03 m 2 Preferred Pharmacy Pharmacy Name Phone [facility]PHARMACY [id number] [address] [phone] Your Updated Medication List  
  
   
This list is accurate as of: 11/28/17  4:44 PM.  Always use your most recent med list.  
  
  
  
  
 albuterol 90 mcg/actuation inhaler Commonly known as:  PROVENTIL HFA, VENTOLIN HFA, PROAIR HFA Take  by inhalation. amLODIPine 10 mg tablet Commonly known as:  Arina Jean Baptiste Take 1 Tab by mouth daily. buPROPion  mg tablet Commonly known as:  WELLBUTRIN XL  
  
 CENTRUM SILVER PO Take  by mouth. Cetirizine 10 mg Cap Commonly known as:  ZyrTEC Take 10 mg by mouth nightly. cholecalciferol (VITAMIN D3) 5,000 unit Tab tablet Commonly known as:  VITAMIN D3 Take  by mouth daily. dextroamphetamine-amphetamine 20 mg tablet Commonly known as:  ADDERALL Take 20 mg by mouth two (2) times a day. meloxicam 15 mg tablet Commonly known as:  MOBIC  
  
 methylPREDNISolone 4 mg tablet Commonly known as:  Kennth Faes Take as directed OMEGA 3 PO Take  by mouth. OTHER  
400 mg. Indications: magnesium complex  
  
 simvastatin 40 mg tablet Commonly known as:  ZOCOR Take 1 Tab by mouth nightly. Prescriptions Sent to Pharmacy Refills  
 amLODIPine (NORVASC) 10 mg tablet 1 Sig: Take 1 Tab by mouth daily. Class: Normal  
 Pharmacy: [facility]/pharmacy [facility] [address] Ph #: [phone] Route: Oral  
  
Introducing [facility] & HEALTH SERVICES! Apolonia Pantoja introduces [employer] patient portal. Now you can access parts of your medical record, email your doctor's office, and request medication refills online. 1. In your internet browser, go to [email or web address] - teamwork across global offices. "[email or web address]/[email or web address] - teamwork across global offices 2. Click on the First Time User? Click Here link in the Sign In box. You will see the New Member Sign Up page. 3. Enter your [employer] Access Code exactly as it appears below. You will not need to use this code after youve completed the sign-up process. If you do not sign up before the expiration date, you must request a new code. · [employer] Access Code: [email or web address] Expires: 2/26/2018  4:44 PM 
 
4. Enter the last four digits of your Social Security Number (xxxx) and Date of Birth (mm/dd/yyyy) as indicated and click Submit. You will be taken to the next sign-up page. 5. Create a [facility] ID. This will be your [facility] login ID and cannot be changed, so think of one that is secure and easy to remember. 6. Create a [facility] password. You can change your password at any time. 7. Enter your Password Reset Question and Answer. This can be used at a later time if you forget your password. 8. Enter your e-mail address. You will receive e-mail notification when new information is available in [address] 9. Click Sign Up. You can now view and download portions of your medical record. 10. Click the Download Summary menu link to download a portable copy of your medical information. If you have questions, please visit the Frequently Asked Questions section of the [facility] website. Remember, [facility] is NOT to be used for urgent needs. For medical emergencies, dial 911. Now available from your iPhone and Android! Please provide this summary of care documentation to your next provider. Your primary care clinician is listed as Jhonny Rodriguez. If you have any questions after today's visit, please call [phone].

## 2017-11-28 NOTE — PROGRESS NOTES
Chief Complaint   Patient presents with    Blood Pressure Check     noticed and elevated BP.several times (room 7)       1. Have you been to the ER, urgent care clinic since your last visit? Hospitalized since your last visit? No    2. Have you seen or consulted any other health care providers outside of the [address] [place] [address] since your last visit? Include any pap smears or colon screening.  No

## 2018-03-26 DIAGNOSIS — E78.00 HYPERCHOLESTEROLEMIA: ICD-10-CM

## 2018-05-10 ENCOUNTER — DOCUMENTATION ONLY (OUTPATIENT)
Dept: INTERNAL MEDICINE CLINIC | Facility: CLINIC | Age: 58
End: 2018-05-10

## 2019-03-11 ENCOUNTER — OFFICE VISIT (OUTPATIENT)
Dept: INTERNAL MEDICINE CLINIC | Age: 59
End: 2019-03-11

## 2019-03-11 VITALS
HEIGHT: 68 IN | TEMPERATURE: 98 F | WEIGHT: 192.1 LBS | BODY MASS INDEX: 29.12 KG/M2 | SYSTOLIC BLOOD PRESSURE: 148 MMHG | DIASTOLIC BLOOD PRESSURE: 95 MMHG | OXYGEN SATURATION: 98 % | RESPIRATION RATE: 18 BRPM | HEART RATE: 69 BPM

## 2019-03-11 DIAGNOSIS — Z12.11 COLON CANCER SCREENING: ICD-10-CM

## 2019-03-11 DIAGNOSIS — Z12.31 SCREENING MAMMOGRAM, ENCOUNTER FOR: ICD-10-CM

## 2019-03-11 DIAGNOSIS — Z00.00 ENCOUNTER FOR MEDICAL EXAMINATION TO ESTABLISH CARE: Primary | ICD-10-CM

## 2019-03-11 DIAGNOSIS — E78.2 MIXED HYPERLIPIDEMIA: ICD-10-CM

## 2019-03-11 DIAGNOSIS — E55.9 VITAMIN D DEFICIENCY: ICD-10-CM

## 2019-03-11 DIAGNOSIS — R35.0 URINE FREQUENCY: ICD-10-CM

## 2019-03-11 DIAGNOSIS — I10 ESSENTIAL HYPERTENSION: ICD-10-CM

## 2019-03-11 DIAGNOSIS — F34.1 DYSTHYMIA: ICD-10-CM

## 2019-03-11 DIAGNOSIS — M54.50 CHRONIC BILATERAL LOW BACK PAIN WITHOUT SCIATICA: ICD-10-CM

## 2019-03-11 DIAGNOSIS — F90.2 ATTENTION DEFICIT HYPERACTIVITY DISORDER (ADHD), COMBINED TYPE: ICD-10-CM

## 2019-03-11 DIAGNOSIS — D86.9 SARCOID: ICD-10-CM

## 2019-03-11 DIAGNOSIS — G89.29 CHRONIC BILATERAL LOW BACK PAIN WITHOUT SCIATICA: ICD-10-CM

## 2019-03-11 DIAGNOSIS — L30.9 ECZEMA, UNSPECIFIED TYPE: ICD-10-CM

## 2019-03-11 DIAGNOSIS — R31.9 HEMATURIA, UNSPECIFIED TYPE: ICD-10-CM

## 2019-03-11 DIAGNOSIS — L85.3 DRY SKIN: ICD-10-CM

## 2019-03-11 DIAGNOSIS — I10 ESSENTIAL HYPERTENSION WITH GOAL BLOOD PRESSURE LESS THAN 140/90: ICD-10-CM

## 2019-03-11 LAB
BILIRUB UR QL STRIP: NEGATIVE
GLUCOSE UR-MCNC: NEGATIVE MG/DL
KETONES P FAST UR STRIP-MCNC: NEGATIVE MG/DL
PH UR STRIP: 5.5 [PH] (ref 4.6–8)
PROT UR QL STRIP: NORMAL
SP GR UR STRIP: 1.03 (ref 1–1.03)
UA UROBILINOGEN AMB POC: NORMAL (ref 0.2–1)
URINALYSIS CLARITY POC: CLEAR
URINALYSIS COLOR POC: YELLOW
URINE BLOOD POC: NORMAL
URINE LEUKOCYTES POC: NEGATIVE
URINE NITRITES POC: NEGATIVE

## 2019-03-11 RX ORDER — LANOLIN ALCOHOL/MO/W.PET/CERES
1000 CREAM (GRAM) TOPICAL DAILY
COMMUNITY
End: 2021-06-09

## 2019-03-11 RX ORDER — ASCORBIC ACID 500 MG
TABLET ORAL
COMMUNITY

## 2019-03-11 RX ORDER — CLOBETASOL PROPIONATE 0.5 MG/G
CREAM TOPICAL
Qty: 30 G | Refills: 1 | Status: SHIPPED | OUTPATIENT
Start: 2019-03-11 | End: 2020-07-21 | Stop reason: SDUPTHER

## 2019-03-11 RX ORDER — AMLODIPINE BESYLATE 10 MG/1
TABLET ORAL
Qty: 90 TAB | Refills: 1 | Status: SHIPPED | OUTPATIENT
Start: 2019-03-11 | End: 2019-09-03 | Stop reason: SDUPTHER

## 2019-03-11 NOTE — PATIENT INSTRUCTIONS
1. Please get lab work, schedule mammogram    2. We will send the order to [facility] for colonoscopy and work on getting you back into the pulmonary clinic for your sarcoid    3. Restart amlodipine for BP    4. Cream sent for eczema    5. Please work on getting appointment with psychiatry: we have one in this building but here is another office:  [facility]  Address: [address] [place] [address] #Farhad Nicholson [address]  Phone: [phone]  Once I get your records from [facility] regarding past neuropsych testing, I can write for you medicine until you are established with psychiatry    6. Try monistat topically- let me know if that doesn't work. A Healthy Lifestyle: Care Instructions  Your Care Instructions    A healthy lifestyle can help you feel good, stay at a healthy weight, and have plenty of energy for both work and play. A healthy lifestyle is something you can share with your whole family. A healthy lifestyle also can lower your risk for serious health problems, such as high blood pressure, heart disease, and diabetes. You can follow a few steps listed below to improve your health and the health of your family. Follow-up care is a key part of your treatment and safety. Be sure to make and go to all appointments, and call your doctor if you are having problems. It's also a good idea to know your test results and keep a list of the medicines you take. How can you care for yourself at home? · Do not eat too much sugar, fat, or fast foods. You can still have dessert and treats now and then. The goal is moderation. · Start small to improve your eating habits. Pay attention to portion sizes, drink less juice and soda pop, and eat more fruits and vegetables. ? Eat a healthy amount of food. A 3-ounce serving of meat, for example, is about the size of a deck of cards. Fill the rest of your plate with vegetables and whole grains. ? Limit the amount of soda and sports drinks you have every day.  Drink more water when you are thirsty. ? Eat at least 5 servings of fruits and vegetables every day. It may seem like a lot, but it is not hard to reach this goal. A serving or helping is 1 piece of fruit, 1 cup of vegetables, or 2 cups of leafy, raw vegetables. Have an apple or some carrot sticks as an afternoon snack instead of a candy bar. Try to have fruits and/or vegetables at every meal.  · Make exercise part of your daily routine. You may want to start with simple activities, such as walking, bicycling, or slow swimming. Try to be active 30 to 60 minutes every day. You do not need to do all 30 to 60 minutes all at once. For example, you can exercise 3 times a day for 10 or 20 minutes. Moderate exercise is safe for most people, but it is always a good idea to talk to your doctor before starting an exercise program.  · Keep moving. Jaydon Curls the lawn, work in the garden, or [employer]. Take the stairs instead of the elevator at work. · If you smoke, quit. People who smoke have an increased risk for heart attack, stroke, cancer, and other lung illnesses. Quitting is hard, but there are ways to boost your chance of quitting tobacco for good. ? Use nicotine gum, patches, or lozenges. ? Ask your doctor about stop-smoking programs and medicines. ? Keep trying. In addition to reducing your risk of diseases in the future, you will notice some benefits soon after you stop using tobacco. If you have shortness of breath or asthma symptoms, they will likely get better within a few weeks after you quit. · Limit how much alcohol you drink. Moderate amounts of alcohol (up to 2 drinks a day for men, 1 drink a day for women) are okay. But drinking too much can lead to liver problems, high blood pressure, and other health problems. Family health  If you have a family, there are many things you can do together to improve your health. · Eat meals together as a family as often as possible. · Eat healthy foods.  This includes fruits, vegetables, lean meats and dairy, and whole grains. · Include your family in your fitness plan. Most people think of activities such as jogging or tennis as the way to fitness, but there are many ways you and your family can be more active. Anything that makes you breathe hard and gets your heart pumping is exercise. Here are some tips:  ? Walk to do errands or to take your child to school or the bus.  ? Go for a family bike ride after dinner instead of watching TV. Where can you learn more? Go to http://ethel-kira.info/. Enter I027 in the search box to learn more about \"A Healthy Lifestyle: Care Instructions. \"  Current as of: September 11, 2018  Content Version: 11.9  © 7648-1878 [employer]. Care instructions adapted under license by [employer] (which disclaims liability or warranty for this information). If you have questions about a medical condition or this instruction, always ask your healthcare professional. [place] [address] any warranty or liability for your use of this information.

## 2019-03-11 NOTE — Clinical Note
She needs to follow up w/ sarcoid clinic at [facility] - was previously a patient there. She also wishes to have her colonoscopy [facility], please fax the order

## 2019-03-11 NOTE — PROGRESS NOTES
Subjective: (As above and below)     Chief Complaint   Patient presents with    Medication Refill     ADHD, Blood pressure    Tailbone Pain    LOW BACK PAIN    Vaginal Itching    Dry Skin     Pt c/o flare up     David Muñoz is a 62y.o. year old female who presents to [facility] care. Last PCP: 1 year ago. She is followed by no specialists. Was seeing psychiatry at the [facility] for ADD and depression. Was also previously followed by sarcoid clinic at [facility]    Hypertension ROS: no meds x approx 3 months, did well on amlodipine    Depression/ADD: off wellbutrin for a few months, did not feel like it was working for depression. She felt that it helped some w/ ADD. She was then started on adderall which she says was helping. She had neuropscyh testing done, will call back w/ name of provider for records. She was getting meds thru [facility] but does not plan on returning there. She feels that her ADD is interfering with her life. She feels \"out of control\" sometimes. She works part time as a private duty nurse. No suicidal ideation. Hyperlipidemia: stopped statin bc it cause bad myalgias, takes fish oil. Room for improvement w/ diet    Dry skin:hx of eczema - clobetasol previously worked- outbreak now to hands     Vaginal itching: near clitoris. No disharge. No rash/swelling. Just started. Has only tried vagisil. No dysuria but she does have urinary frequency over the past few weeks. Rheumatoid arthritis: no longer seeing rheumatology- has been in \"remission\"    Low back pain: chronic. Pain is midline. Denies saddle numbness, leg weakness, falls or bowel/bladder. She went to PT which didn't help much. Pain feels \"locking up\". She takes mobic which doesn't help too much. She wishes to see ortho again.      Wt Readings from Last 3 Encounters:   03/11/19 192 lb 1.6 oz (87.1 kg)   11/28/17 190 lb (86.2 kg)   07/20/17 183 lb (83 kg)       Mammogram:due    Sarcoid: saw pulm associates in the past, also had biopsy at [facility]. .. Needs to re-establish w/ sarcoid clinic. Has some LUO but is quite sedentary    Colonoscopy: due - will do at [facility]        Reviewed PmHx, RxHx, FmHx, SocHx, AllgHx and updated in chart. Family History   Problem Relation Age of Onset    Hypertension Mother     Diabetes Mother     Stroke Mother          ~ 65 yo    Heart Disease Father          ~ 78 yo    Breast Cancer Cousin         mat. 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 No pets in the house. Current Outpatient Medications   Medication Sig    ascorbic acid, vitamin C, (VITAMIN C) 500 mg tablet Take  by mouth.  amLODIPine (NORVASC) 10 mg tablet TAKE 1 TAB BY MOUTH DAILY. For blood pressure    clobetasol (TEMOVATE) 0.05 % topical cream Apply  to affected area two (2) times daily as needed for Skin Irritation. eczema    Cetirizine (ZYRTEC) 10 mg cap Take 10 mg by mouth nightly.  meloxicam (MOBIC) 15 mg tablet     OTHER 400 mg. Indications: magnesium complex    FOLIC ACID/MULTIVIT-MIN/LUTEIN (CENTRUM SILVER PO) Take  by mouth.  albuterol (PROVENTIL HFA, VENTOLIN HFA, PROAIR HFA) 90 mcg/actuation inhaler Take  by inhalation.     dextroamphetamine-amphetamine (ADDERALL) 20 mg tablet Take 20 mg by mouth two (2) times a day.  cyanocobalamin 1,000 mcg tablet Take 1,000 mcg by mouth daily.  FLAXSEED OIL (OMEGA 3 PO) Take  by mouth.  cholecalciferol, VITAMIN D3, (VITAMIN D3) 5,000 unit tab tablet Take  by mouth daily. No current facility-administered medications for this visit. Review of Systems:   Constitutional:    Negative for fever and chills, negative diaphoresis. HEENT:              Negative for neck pain and stiffness. Eyes:                  Negative for visual disturbance, itching, redness or discharge. Respiratory:        Negative for cough and shortness of breath. +LUO  Cardiovascular:  Negative for chest pain and palpitations. Gastrointestinal: Negative for nausea, vomiting, abdominal pain, diarrhea or constipation. Genitourinary:     Negative for dysuria and + frequency. Musculoskeletal:LBP  Skin:                    +eczema  Neurological:       Negative for dizzyness, seizure, loss of consciousness, weakness and numbness.      Objective:     Vitals:    03/11/19 1452   BP: (!) 148/95   Pulse: 69   Resp: 18   Temp: 98 °F (36.7 °C)   TempSrc: Oral   SpO2: 98%   Weight: 192 lb 1.6 oz (87.1 kg)   Height: 5' 8\" (1.727 m)       Results for orders placed or performed in visit on 03/11/19   AMB POC URINALYSIS DIP STICK AUTO W/O MICRO   Result Value Ref Range    Color (UA POC) Yellow     Clarity (UA POC) Clear     Glucose (UA POC) Negative Negative    Bilirubin (UA POC) Negative Negative    Ketones (UA POC) Negative Negative    Specific gravity (UA POC) 1.030 1.001 - 1.035    Blood (UA POC) 3+ Negative    pH (UA POC) 5.5 4.6 - 8.0    Protein (UA POC) Trace Negative    Urobilinogen (UA POC) 0.2 mg/dL 0.2 - 1    Nitrites (UA POC) Negative Negative    Leukocyte esterase (UA POC) Negative Negative         Physical Examination: General appearance - alert, well appearing, and in no distress  Ears - bilateral TM's and external ear canals normal  Chest - clear to auscultation, no wheezes, rales or rhonchi, symmetric air entry  Heart - normal rate, regular rhythm, normal S1, S2, no murmurs, rubs, clicks or gallops  Neurological - alert, oriented, normal speech, no focal findings or movement disorder noted  Extremities - no pedal edema noted  Skin - DERMATITIS NOTED: eczematoid dermatitis on hands      Assessment/ Plan:   Follow-up Disposition:  Return in about 6 weeks (around 4/22/2019) for nv bp check then 3 mo. 1. Encounter for medical examination to establish care      2. Essential hypertension    - METABOLIC PANEL, COMPREHENSIVE  - CBC W/O DIFF  - LIPID PANEL    3. Mixed hyperlipidemia      4. Screening mammogram, encounter for    - [facility] MAMMO BI SCREENING INCL CAD; Future    5. Dry skin    - TSH 3RD GENERATION    6. Vitamin D deficiency    - VITAMIN D, 25 HYDROXY; Future    7. Chronic bilateral low back pain without sciatica    - REFERRAL TO ORTHOPEDICS    8. Colon cancer screening  Will order thru [facility]    9. Eczema, unspecified type    - clobetasol (TEMOVATE) 0.05 % topical cream; Apply  to affected area two (2) times daily as needed for Skin Irritation. eczema  Dispense: 30 g; Refill: 1    10. Dysthymia    - REFERRAL TO PSYCHIATRY    11. Attention deficit hyperactivity disorder (ADHD), combined type  Will get records from [facility]  - REFERRAL TO PSYCHIATRY    12. Urine frequency    - AMB POC URINALYSIS DIP STICK AUTO W/O MICRO    13. Essential hypertension with goal blood pressure less than 140/90  resume  - amLODIPine (NORVASC) 10 mg tablet; TAKE 1 TAB BY MOUTH DAILY. For blood pressure  Dispense: 90 Tab; Refill: 1    14. Sarcoid    - REFERRAL TO PULMONARY DISEASE    15. Hematuria, unspecified type    - CULTURE, URINE        I have discussed the diagnosis with the patient and the intended plan as seen in the above orders. The patient has received an after-visit summary and questions were answered concerning future plans.   Pt conveyed understanding of plan. Medication Side Effects and Warnings were discussed with patient: yes  Patient Labs were reviewed: yes  Patient Past Records were reviewed:  yes    Madelin Weeks.  Rashi Santos NP

## 2019-03-11 NOTE — PROGRESS NOTES
Pt here for   Chief Complaint   Patient presents with    Medication Refill     ADHD, Blood pressure    Tailbone Pain    LOW BACK PAIN    Vaginal Itching    Dry Skin     Pt c/o flare up     1. Have you been to the ER, urgent care clinic since your last visit? Hospitalized since your last visit? No    2. Have you seen or consulted any other health care providers outside of the [address] [place] [address] since your last visit? Include any pap smears or colon screening.  No       Pt denies pain at this time        3 most recent PHQ Screens 3/11/2019   PHQ Not Done Active Diagnosis of Depression or Bipolar Disorder   Little interest or pleasure in doing things -   Feeling down, depressed, irritable, or hopeless -   Total Score PHQ 2 -

## 2019-03-12 LAB
25(OH)D3+25(OH)D2 SERPL-MCNC: 21 NG/ML (ref 30–100)
ALBUMIN SERPL-MCNC: 4.4 G/DL (ref 3.5–5.5)
ALBUMIN/GLOB SERPL: 1.5 {RATIO} (ref 1.2–2.2)
ALP SERPL-CCNC: 147 IU/L (ref 39–117)
ALT SERPL-CCNC: 12 IU/L (ref 0–32)
AST SERPL-CCNC: 20 IU/L (ref 0–40)
BACTERIA UR CULT: NORMAL
BILIRUB SERPL-MCNC: 0.2 MG/DL (ref 0–1.2)
BUN SERPL-MCNC: 14 MG/DL (ref 6–24)
BUN/CREAT SERPL: 15 (ref 9–23)
CALCIUM SERPL-MCNC: 9.5 MG/DL (ref 8.7–10.2)
CHLORIDE SERPL-SCNC: 106 MMOL/L (ref 96–106)
CHOLEST SERPL-MCNC: 224 MG/DL (ref 100–199)
CO2 SERPL-SCNC: 25 MMOL/L (ref 20–29)
CREAT SERPL-MCNC: 0.94 MG/DL (ref 0.57–1)
ERYTHROCYTE [DISTWIDTH] IN BLOOD BY AUTOMATED COUNT: 14.1 % (ref 12.3–15.4)
GLOBULIN SER CALC-MCNC: 3 G/DL (ref 1.5–4.5)
GLUCOSE SERPL-MCNC: 82 MG/DL (ref 65–99)
HCT VFR BLD AUTO: 40.8 % (ref 34–46.6)
HDLC SERPL-MCNC: 42 MG/DL
HGB BLD-MCNC: 13.8 G/DL (ref 11.1–15.9)
INTERPRETATION, 910389: NORMAL
LDLC SERPL CALC-MCNC: 126 MG/DL (ref 0–99)
MCH RBC QN AUTO: 30.8 PG (ref 26.6–33)
MCHC RBC AUTO-ENTMCNC: 33.8 G/DL (ref 31.5–35.7)
MCV RBC AUTO: 91 FL (ref 79–97)
PLATELET # BLD AUTO: 290 X10E3/UL (ref 150–379)
POTASSIUM SERPL-SCNC: 4.5 MMOL/L (ref 3.5–5.2)
PROT SERPL-MCNC: 7.4 G/DL (ref 6–8.5)
RBC # BLD AUTO: 4.48 X10E6/UL (ref 3.77–5.28)
SODIUM SERPL-SCNC: 145 MMOL/L (ref 134–144)
TRIGL SERPL-MCNC: 278 MG/DL (ref 0–149)
TSH SERPL DL<=0.005 MIU/L-ACNC: 0.38 UIU/ML (ref 0.45–4.5)
VLDLC SERPL CALC-MCNC: 56 MG/DL (ref 5–40)
WBC # BLD AUTO: 3.1 X10E3/UL (ref 3.4–10.8)

## 2019-03-19 ENCOUNTER — HOSPITAL ENCOUNTER (OUTPATIENT)
Dept: MAMMOGRAPHY | Age: 59
Discharge: HOME | End: 2019-03-19
Attending: NURSE PRACTITIONER
Payer: COMMERCIAL

## 2019-03-19 DIAGNOSIS — Z12.31 SCREENING MAMMOGRAM, ENCOUNTER FOR: ICD-10-CM

## 2019-03-19 PROCEDURE — 77067 SCR MAMMO BI INCL CAD: CPT

## 2019-03-29 ENCOUNTER — HOSPITAL ENCOUNTER (EMERGENCY)
Age: 59
Discharge: HOME | End: 2019-03-29
Attending: EMERGENCY MEDICINE
Payer: COMMERCIAL

## 2019-03-29 ENCOUNTER — APPOINTMENT (OUTPATIENT)
Dept: CT IMAGING | Age: 59
End: 2019-03-29
Attending: EMERGENCY MEDICINE
Payer: COMMERCIAL

## 2019-03-29 VITALS
SYSTOLIC BLOOD PRESSURE: 144 MMHG | OXYGEN SATURATION: 99 % | HEART RATE: 71 BPM | TEMPERATURE: 97.9 F | DIASTOLIC BLOOD PRESSURE: 95 MMHG | RESPIRATION RATE: 18 BRPM

## 2019-03-29 DIAGNOSIS — M54.50 ACUTE LEFT-SIDED LOW BACK PAIN WITHOUT SCIATICA: Primary | ICD-10-CM

## 2019-03-29 PROCEDURE — 96372 THER/PROPH/DIAG INJ SC/IM: CPT

## 2019-03-29 PROCEDURE — 99284 EMERGENCY DEPT VISIT MOD MDM: CPT

## 2019-03-29 PROCEDURE — 74011250636 HC RX REV CODE- 250/636: Performed by: EMERGENCY MEDICINE

## 2019-03-29 PROCEDURE — 74011636637 HC RX REV CODE- 636/637: Performed by: EMERGENCY MEDICINE

## 2019-03-29 PROCEDURE — 74011250637 HC RX REV CODE- 250/637: Performed by: EMERGENCY MEDICINE

## 2019-03-29 PROCEDURE — 74176 CT ABD & PELVIS W/O CONTRAST: CPT

## 2019-03-29 RX ORDER — CYCLOBENZAPRINE HCL 10 MG
10 TABLET ORAL
Qty: 15 TAB | Refills: 0 | Status: SHIPPED | OUTPATIENT
Start: 2019-03-29 | End: 2019-12-10 | Stop reason: SDUPTHER

## 2019-03-29 RX ORDER — KETOROLAC TROMETHAMINE 30 MG/ML
30 INJECTION, SOLUTION INTRAMUSCULAR; INTRAVENOUS
Status: COMPLETED | OUTPATIENT
Start: 2019-03-29 | End: 2019-03-29

## 2019-03-29 RX ORDER — DIAZEPAM 5 MG/1
5 TABLET ORAL
Status: COMPLETED | OUTPATIENT
Start: 2019-03-29 | End: 2019-03-29

## 2019-03-29 RX ORDER — PREDNISONE 20 MG/1
60 TABLET ORAL ONCE
Status: COMPLETED | OUTPATIENT
Start: 2019-03-29 | End: 2019-03-29

## 2019-03-29 RX ORDER — HYDROCODONE BITARTRATE AND ACETAMINOPHEN 5; 325 MG/1; MG/1
1-2 TABLET ORAL
Qty: 12 TAB | Refills: 0 | Status: SHIPPED | OUTPATIENT
Start: 2019-03-29 | End: 2019-04-01

## 2019-03-29 RX ADMIN — KETOROLAC TROMETHAMINE 30 MG: 30 INJECTION, SOLUTION INTRAMUSCULAR; INTRAVENOUS at 06:21

## 2019-03-29 RX ADMIN — DIAZEPAM 5 MG: 5 TABLET ORAL at 06:19

## 2019-03-29 RX ADMIN — PREDNISONE 60 MG: 20 TABLET ORAL at 06:20

## 2019-03-29 NOTE — DISCHARGE INSTRUCTIONS
Patient Education     Patient Education     You do have a small kidney stone in the right kidney, none on the left to explain your pain. Your pain is more likely musculoskeletal in nature. Continue the Meloxicam. Take t he Flexeril to help with spasm. You can use the Norco for more severe pain- but use cautiously as it can be addictive and will make you drowsy (can not drive, etc. While taking it)   Please call and arrange close follow up with the orthopedic doctor for further management and work up of your recurrent back pain. Back Pain: Care Instructions  Your Care Instructions    Back pain has many possible causes. It is often related to problems with muscles and ligaments of the back. It may also be related to problems with the nerves, discs, or bones of the back. Moving, lifting, standing, sitting, or sleeping in an awkward way can strain the back. Sometimes you don't notice the injury until later. Arthritis is another common cause of back pain. Although it may hurt a lot, back pain usually improves on its own within several weeks. Most people recover in 12 weeks or less. Using good home treatment and being careful not to stress your back can help you feel better sooner. Follow-up care is a key part of your treatment and safety. Be sure to make and go to all appointments, and call your doctor if you are having problems. It's also a good idea to know your test results and keep a list of the medicines you take. How can you care for yourself at home? · Sit or lie in positions that are most comfortable and reduce your pain. Try one of these positions when you lie down:  ? Lie on your back with your knees bent and supported by large pillows. ? Lie on the floor with your legs on the seat of a sofa or chair. ? Lie on your side with your knees and hips bent and a pillow between your legs. ? Lie on your stomach if it does not make pain worse.   · Do not sit up in bed, and avoid soft couches and twisted positions. Bed rest can help relieve pain at first, but it delays healing. Avoid bed rest after the first day of back pain. · Change positions every 30 minutes. If you must sit for long periods of time, take breaks from sitting. Get up and walk around, or lie in a comfortable position. · Try using a heating pad on a low or medium setting for 15 to 20 minutes every 2 or 3 hours. Try a warm shower in place of one session with the heating pad. · You can also try an ice pack for 10 to 15 minutes every 2 to 3 hours. Put a thin cloth between the ice pack and your skin. · Take pain medicines exactly as directed. ? If the doctor gave you a prescription medicine for pain, take it as prescribed. ? If you are not taking a prescription pain medicine, ask your doctor if you can take an over-the-counter medicine. · Take short walks several times a day. You can start with 5 to 10 minutes, 3 or 4 times a day, and work up to longer walks. Walk on level surfaces and avoid hills and stairs until your back is better. · Return to work and other activities as soon as you can. Continued rest without activity is usually not good for your back. · To prevent future back pain, do exercises to stretch and strengthen your back and stomach. Learn how to use good posture, safe lifting techniques, and proper body mechanics. When should you call for help? Call your doctor now or seek immediate medical care if:    · You have new or worsening numbness in your legs.     · You have new or worsening weakness in your legs. (This could make it hard to stand up.)     · You lose control of your bladder or bowels.    Watch closely for changes in your health, and be sure to contact your doctor if:    · You have a fever, lose weight, or don't feel well.     · You do not get better as expected. Where can you learn more? Go to http://ethel-kira.info/.   Enter G895 in the search box to learn more about \"Back Pain: Care Instructions. \"  Current as of: September 20, 2018  Content Version: 11.9  © 5030-3181 [employer]. Care instructions adapted under license by [employer] (which disclaims liability or warranty for this information). If you have questions about a medical condition or this instruction, always ask your healthcare professional. [address] any warranty or liability for your use of this information. Learning About Relief for Back Pain  What is back tension and strain? Back strain happens when you overstretch, or pull, a muscle in your back. You may hurt your back in an accident or when you exercise or lift something. Most back pain will get better with rest and time. You can take care of yourself at home to help your back heal.  What can you do first to relieve back pain? When you first feel back pain, try these steps:  · Walk. Take a short walk (10 to 20 minutes) on a level surface (no slopes, hills, or stairs) every 2 to 3 hours. Walk only distances you can manage without pain, especially leg pain. · Relax. Find a comfortable position for rest. Some people are comfortable on the floor or a medium-firm bed with a small pillow under their head and another under their knees. Some people prefer to lie on their side with a pillow between their knees. Don't stay in one position for too long. · Try heat or ice. Try using a heating pad on a low or medium setting, or take a warm shower, for 15 to 20 minutes every 2 to 3 hours. Or you can buy single-use heat wraps that last up to 8 hours. You can also try an ice pack for 10 to 15 minutes every 2 to 3 hours. You can use an ice pack or a bag of frozen vegetables wrapped in a thin towel. There is not strong evidence that either heat or ice will help, but you can try them to see if they help. You may also want to try switching between heat and cold. · Take pain medicine exactly as directed.   ¨ If the doctor gave you a prescription medicine for pain, take it as prescribed. ¨ If you are not taking a prescription pain medicine, ask your doctor if you can take an over-the-counter medicine. What else can you do? · Stretch and exercise. Exercises that increase flexibility may relieve your pain and make it easier for your muscles to keep your spine in a good, neutral position. And don't forget to keep walking. · Do self-massage. You can use self-massage to unwind after work or school or to energize yourself in the morning. You can easily massage your feet, hands, or neck. Self-massage works best if you are in comfortable clothes and are sitting or lying in a comfortable position. Use oil or lotion to massage bare skin. · Reduce stress. Back pain can lead to a vicious [place]: Distress about the pain tenses the muscles in your back, which in turn causes more pain. Learn how to relax your mind and your muscles to lower your stress. Where can you learn more? Go to http://ethel-kira.info/. Enter P218 in the search box to learn more about \"Learning About Relief for Back Pain. \"  Current as of: March 21, 2017  Content Version: 11.5  © 1081-3064 [employer], [employer]. Care instructions adapted under license by [employer] (which disclaims liability or warranty for this information). If you have questions about a medical condition or this instruction, always ask your healthcare professional. [place] [address] any warranty or liability for your use of this information.

## 2019-03-29 NOTE — LETTER
[address] 
[address] [facility] [id number] 
[phone] Work/School Note Date: 3/29/2019 To Whom It May concern: 
 
Zo Moses was seen and treated today in the emergency room by the following provider(s): 
Attending Provider: Samia Moreno MD. Zo Moses may return to work on Monday April 1, 2019.  
 
Sincerely, 
 
 
 
 
Tex Cogan, MD

## 2019-03-29 NOTE — ED NOTES
Provider reviewed discharge instructions and options with patient and patient verbalized understanding. RN reviewed discharge instructions using teachback method. Pt ambulated to exit without difficulty and in no signs of acute distress. Patient was counseled on medications prescribed at discharge. VSS at time of discharge. No complaints, needs, or questions at this time. Pt to call PCP and ortho in the morning for follow up.

## 2019-03-29 NOTE — ED PROVIDER NOTES
HPI     62year old female with cholesterol, HTN, rheumatoid arthritis, sacroid, ADHD, presents with severe 10/10 left lower back pain when she got up this morning. Got sweaty, nauseated, sob. Has happened once before where her back locked up like this. Couldn't move. Does not radiate. Was in the doctors earlier this week for abdominal cramping, doctor found blood in her urine. No h/o stones. Has not taken anything for the pain. No leg pain or swelling, no history of dvt/PE. No fever. Has had a chronic cough- going to see pulmonary who monitors her sarcoid. Denies radiation down legs or weakness/numbness in her legs. No bowel/bladder difficulties. Denies trauma. Pain is worse with movement. Past Medical History:   Diagnosis Date    Adult ADHD     Depression     Hypercholesterolemia     Hypertension     Rheumatoid arthritis [address] [address]     Sarcoid     Sinus Infection        Past Surgical History:   Procedure Laterality Date    HX BREAST BIOPSY      ? left breast - benign- no visible scar    HX MOHS PROCEDURES      HX ORTHOPAEDIC      HX PARTIAL HYSTERECTOMY           Family History:   Problem Relation Age of Onset    Hypertension Mother     Diabetes Mother     Stroke Mother          ~ 65 yo    Heart Disease Father          ~ 80 yo    Breast Cancer Cousin         mat.  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   Social Needs    Financial resource strain: Not on file    Food insecurity:     Worry: Not on file     Inability: Not on file   DynaPump needs:     Medical: Not on file     Non-medical: Not on file   Tobacco Use    Smoking status: Never Smoker    Smokeless tobacco: Never Used   Substance and Sexual Activity    Alcohol use: No    Drug use: No    Sexual activity: Never   Lifestyle    Physical activity:     Days per week: Not on file     Minutes per session: Not on file    Stress: Not on file   Relationships    Social connections:     Talks on phone: Not on file     Gets together: Not on file     Attends [employer] service: Not on file     Active member of club or organization: Not on file     Attends meetings of clubs or organizations: Not on file     Relationship status: Not on file    Intimate partner violence:     Fear of current or ex partner: Not on file     Emotionally abused: Not on file     Physically abused: Not on file     Forced sexual activity: Not on file   Other Topics Concern     Service Not Asked    Blood Transfusions Not Asked    Caffeine Concern Not Asked    Occupational Exposure Not Asked   [employer] Hazards Not Asked    Sleep Concern Not Asked    Stress Concern Not Asked    Weight Concern Not Asked    Special Diet Not Asked    Back Care Not Asked    Exercise Yes     Comment: 3x/week cardio x 30 minutes    Bike Helmet Not Asked    Seat Belt Not Asked    Self-Exams Not Asked   Social History Narrative    Living with son (28) and son's father. No pets in the house. ALLERGIES: Latex; Iodine; Lisinopril; Shellfish derived; and Simvastatin    Review of Systems   Constitutional: Negative for fever. HENT: Negative for congestion. Eyes: Negative for visual disturbance. Respiratory: Positive for cough. Negative for shortness of breath. Cardiovascular: Negative for chest pain and palpitations. Gastrointestinal: Positive for nausea. Negative for abdominal pain and vomiting. Genitourinary: Positive for hematuria. Musculoskeletal: Positive for back pain and gait problem. Skin: Negative for rash. Neurological: Negative for weakness, numbness and headaches. Psychiatric/Behavioral: Negative for dysphoric mood.        Vitals:    03/29/19 0516   BP: 131/87   Pulse: 71   Resp: 18   Temp: 97.9 °F (36.6 °C)   SpO2: 98%            Physical Exam   Constitutional: She is oriented to person, place, and time. She appears well-developed and well-nourished. No distress. HENT:   Head: Normocephalic and atraumatic. Mouth/Throat: No oropharyngeal exudate. Eyes: Pupils are equal, round, and reactive to light. Right eye exhibits no discharge. Left eye exhibits no discharge. No scleral icterus. Neck: Normal range of motion. Neck supple. No JVD present. Cardiovascular: Normal rate, regular rhythm and normal heart sounds. No murmur heard. Pulmonary/Chest: Effort normal and breath sounds normal. No stridor. No respiratory distress. She has no wheezes. She has no rales. She exhibits no tenderness. Abdominal: Soft. Bowel sounds are normal. She exhibits no distension and no mass. There is no tenderness. There is no rebound and no guarding. Musculoskeletal: Normal range of motion. Mild lumbar tenderness   Neurological: She is oriented to person, place, and time. Skin: Skin is warm and dry. Capillary refill takes less than 2 seconds. No rash noted. Psychiatric: She has a normal mood and affect. Her behavior is normal. Judgment and thought content normal.        MDM       Procedures    Medicate for pain/spasm with toradol/valium/prednisone. CT stone study to evaluate for kidney stone given recent hematuria, however seems to be more musculoskeletal in nature. Reassuring neuro exam.      Feels better with meds. D/C with continued MObic, Flexeril and few Norco. Referrral to ortho spine. Reviewed CT scan findings of kidney stones on right.
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
## 2019-03-30 ENCOUNTER — HOSPITAL ENCOUNTER (EMERGENCY)
Age: 59
Discharge: HOME | End: 2019-03-30
Attending: EMERGENCY MEDICINE
Payer: COMMERCIAL

## 2019-03-30 VITALS
DIASTOLIC BLOOD PRESSURE: 80 MMHG | RESPIRATION RATE: 18 BRPM | WEIGHT: 190 LBS | OXYGEN SATURATION: 94 % | TEMPERATURE: 98.3 F | HEART RATE: 89 BPM | HEIGHT: 68 IN | SYSTOLIC BLOOD PRESSURE: 141 MMHG | BODY MASS INDEX: 28.79 KG/M2

## 2019-03-30 DIAGNOSIS — K52.9 GASTROENTERITIS, ACUTE: Primary | ICD-10-CM

## 2019-03-30 LAB
ALBUMIN SERPL-MCNC: 4 G/DL (ref 3.5–5)
ALBUMIN/GLOB SERPL: 1 {RATIO} (ref 1.1–2.2)
ALP SERPL-CCNC: 142 U/L (ref 45–117)
ALT SERPL-CCNC: 19 U/L (ref 12–78)
ANION GAP SERPL CALC-SCNC: 10 MMOL/L (ref 5–15)
APPEARANCE UR: ABNORMAL
AST SERPL-CCNC: 23 U/L (ref 15–37)
BACTERIA URNS QL MICRO: ABNORMAL /HPF
BASOPHILS # BLD: 0 K/UL (ref 0–0.1)
BASOPHILS NFR BLD: 0 % (ref 0–1)
BILIRUB SERPL-MCNC: 0.5 MG/DL (ref 0.2–1)
BILIRUB UR QL: NEGATIVE
BUN SERPL-MCNC: 20 MG/DL (ref 6–20)
BUN/CREAT SERPL: 23 (ref 12–20)
CALCIUM SERPL-MCNC: 9.3 MG/DL (ref 8.5–10.1)
CHLORIDE SERPL-SCNC: 104 MMOL/L (ref 97–108)
CO2 SERPL-SCNC: 28 MMOL/L (ref 21–32)
COLOR UR: ABNORMAL
CREAT SERPL-MCNC: 0.88 MG/DL (ref 0.55–1.02)
DIFFERENTIAL METHOD BLD: ABNORMAL
EOSINOPHIL # BLD: 0 K/UL (ref 0–0.4)
EOSINOPHIL NFR BLD: 0 % (ref 0–7)
EPITH CASTS URNS QL MICRO: ABNORMAL /LPF
ERYTHROCYTE [DISTWIDTH] IN BLOOD BY AUTOMATED COUNT: 12.8 % (ref 11.5–14.5)
GLOBULIN SER CALC-MCNC: 4 G/DL (ref 2–4)
GLUCOSE SERPL-MCNC: 121 MG/DL (ref 65–100)
GLUCOSE UR STRIP.AUTO-MCNC: NEGATIVE MG/DL
HCT VFR BLD AUTO: 43.2 % (ref 35–47)
HGB BLD-MCNC: 14.4 G/DL (ref 11.5–16)
HGB UR QL STRIP: NEGATIVE
IMM GRANULOCYTES # BLD AUTO: 0 K/UL (ref 0–0.04)
IMM GRANULOCYTES NFR BLD AUTO: 0 % (ref 0–0.5)
KETONES UR QL STRIP.AUTO: NEGATIVE MG/DL
LEUKOCYTE ESTERASE UR QL STRIP.AUTO: NEGATIVE
LIPASE SERPL-CCNC: 82 U/L (ref 73–393)
LYMPHOCYTES # BLD: 0.2 K/UL (ref 0.8–3.5)
LYMPHOCYTES NFR BLD: 2 % (ref 12–49)
MCH RBC QN AUTO: 30.2 PG (ref 26–34)
MCHC RBC AUTO-ENTMCNC: 33.3 G/DL (ref 30–36.5)
MCV RBC AUTO: 90.6 FL (ref 80–99)
MONOCYTES # BLD: 0.4 K/UL (ref 0–1)
MONOCYTES NFR BLD: 4 % (ref 5–13)
NEUTS SEG # BLD: 8.2 K/UL (ref 1.8–8)
NEUTS SEG NFR BLD: 94 % (ref 32–75)
NITRITE UR QL STRIP.AUTO: NEGATIVE
NRBC # BLD: 0 K/UL (ref 0–0.01)
NRBC BLD-RTO: 0 PER 100 WBC
PH UR STRIP: 7 [PH] (ref 5–8)
PLATELET # BLD AUTO: 254 K/UL (ref 150–400)
PMV BLD AUTO: 10.9 FL (ref 8.9–12.9)
POTASSIUM SERPL-SCNC: 4.1 MMOL/L (ref 3.5–5.1)
PROT SERPL-MCNC: 8 G/DL (ref 6.4–8.2)
PROT UR STRIP-MCNC: ABNORMAL MG/DL
RBC # BLD AUTO: 4.77 M/UL (ref 3.8–5.2)
RBC #/AREA URNS HPF: ABNORMAL /HPF (ref 0–5)
RBC MORPH BLD: ABNORMAL
SODIUM SERPL-SCNC: 142 MMOL/L (ref 136–145)
SP GR UR REFRACTOMETRY: 1.02 (ref 1–1.03)
UROBILINOGEN UR QL STRIP.AUTO: 0.2 EU/DL (ref 0.2–1)
WBC # BLD AUTO: 8.8 K/UL (ref 3.6–11)
WBC URNS QL MICRO: ABNORMAL /HPF (ref 0–4)

## 2019-03-30 PROCEDURE — 83690 ASSAY OF LIPASE: CPT

## 2019-03-30 PROCEDURE — 99283 EMERGENCY DEPT VISIT LOW MDM: CPT

## 2019-03-30 PROCEDURE — 74011250636 HC RX REV CODE- 250/636: Performed by: EMERGENCY MEDICINE

## 2019-03-30 PROCEDURE — 96361 HYDRATE IV INFUSION ADD-ON: CPT

## 2019-03-30 PROCEDURE — 96375 TX/PRO/DX INJ NEW DRUG ADDON: CPT

## 2019-03-30 PROCEDURE — 81001 URINALYSIS AUTO W/SCOPE: CPT

## 2019-03-30 PROCEDURE — 80053 COMPREHEN METABOLIC PANEL: CPT

## 2019-03-30 PROCEDURE — 96374 THER/PROPH/DIAG INJ IV PUSH: CPT

## 2019-03-30 PROCEDURE — 85025 COMPLETE CBC W/AUTO DIFF WBC: CPT

## 2019-03-30 PROCEDURE — 36415 COLL VENOUS BLD VENIPUNCTURE: CPT

## 2019-03-30 RX ORDER — ONDANSETRON 4 MG/1
4 TABLET, ORALLY DISINTEGRATING ORAL
Qty: 10 TAB | Refills: 0 | Status: SHIPPED | OUTPATIENT
Start: 2019-03-30 | End: 2019-12-10 | Stop reason: ALTCHOICE

## 2019-03-30 RX ORDER — ONDANSETRON 2 MG/ML
4 INJECTION INTRAMUSCULAR; INTRAVENOUS
Status: COMPLETED | OUTPATIENT
Start: 2019-03-30 | End: 2019-03-30

## 2019-03-30 RX ORDER — KETOROLAC TROMETHAMINE 30 MG/ML
30 INJECTION, SOLUTION INTRAMUSCULAR; INTRAVENOUS
Status: COMPLETED | OUTPATIENT
Start: 2019-03-30 | End: 2019-03-30

## 2019-03-30 RX ADMIN — ONDANSETRON HYDROCHLORIDE 4 MG: 2 SOLUTION INTRAMUSCULAR; INTRAVENOUS at 22:23

## 2019-03-30 RX ADMIN — KETOROLAC TROMETHAMINE 30 MG: 30 INJECTION, SOLUTION INTRAMUSCULAR; INTRAVENOUS at 22:23

## 2019-03-30 RX ADMIN — SODIUM CHLORIDE 1000 ML: 900 INJECTION, SOLUTION INTRAVENOUS at 22:23

## 2019-03-31 NOTE — DISCHARGE INSTRUCTIONS
Patient Education        Gastroenteritis: Care Instructions  Your Care Instructions    Gastroenteritis is an illness that may cause nausea, vomiting, and diarrhea. It is sometimes called \"stomach flu. \" It can be caused by bacteria or a virus. You will probably begin to feel better in 1 to 2 days. In the meantime, get plenty of rest and make sure you do not become dehydrated. Dehydration occurs when your body loses too much fluid. Follow-up care is a key part of your treatment and safety. Be sure to make and go to all appointments, and call your doctor if you are having problems. It's also a good idea to know your test results and keep a list of the medicines you take. How can you care for yourself at home? · If your doctor prescribed antibiotics, take them as directed. Do not stop taking them just because you feel better. You need to take the full course of antibiotics. · Drink plenty of fluids to prevent dehydration, enough so that your urine is light yellow or clear like water. Choose water and other caffeine-free clear liquids until you feel better. If you have kidney, heart, or liver disease and have to limit fluids, talk with your doctor before you increase your fluid intake. · Drink fluids slowly, in frequent, small amounts, because drinking too much too fast can cause vomiting. · Begin eating mild foods, such as dry toast, yogurt, applesauce, bananas, and rice. Avoid spicy, hot, or high-fat foods, and do not drink alcohol or caffeine for a day or two. Do not drink milk or eat ice cream until you are feeling better. How to prevent gastroenteritis  · Keep hot foods hot and cold foods cold. · Do not eat meats, dressings, salads, or other foods that have been kept at room temperature for more than 2 hours. · Use a thermometer to check your refrigerator. It should be between 34°F and 40°F.  · Defrost meats in the refrigerator or microwave, not on the kitchen counter.   · Keep your hands and your kitchen clean. Wash your hands, cutting boards, and countertops with hot soapy water frequently. · Cook meat until it is well done. · Do not eat raw eggs or uncooked sauces made with raw eggs. · Do not take chances. If food looks or tastes spoiled, throw it out. When should you call for help? Call 911 anytime you think you may need emergency care. For example, call if:    · You vomit blood or what looks like coffee grounds.     · You passed out (lost consciousness).     · You pass maroon or very bloody stools.    Call your doctor now or seek immediate medical care if:    · You have severe belly pain.     · You have signs of needing more fluids. You have sunken eyes, a dry mouth, and pass only a little dark urine.     · You feel like you are going to faint.     · You have increased belly pain that does not go away in 1 to 2 days.     · You have new or increased nausea, or you are vomiting.     · You have a new or higher fever.     · Your stools are black and tarlike or have streaks of blood.    Watch closely for changes in your health, and be sure to contact your doctor if:    · You are dizzy or lightheaded.     · You urinate less than usual, or your urine is dark yellow or brown.     · You do not feel better with each day that goes by. Where can you learn more? Go to http://ethel-kira.info/. Enter N142 in the search box to learn more about \"Gastroenteritis: Care Instructions. \"  Current as of: July 30, 2018  Content Version: 11.9  © 2733-9602 [employer]. Care instructions adapted under license by [employer] (which disclaims liability or warranty for this information). If you have questions about a medical condition or this instruction, always ask your healthcare professional. [place] [address] any warranty or liability for your use of this information.

## 2019-03-31 NOTE — ED PROVIDER NOTES
EMERGENCY DEPARTMENT HISTORY AND PHYSICAL EXAM      Date: 3/30/2019  Patient Name: Mango Ontiveros    History of Presenting Illness     Chief Complaint   Patient presents with    Vomiting     pt reports 5 episodes of emesis since 1400 today, denies any pain       History Provided By: Patient    HPI: Mango Ontiveros, 62 y.o. female with PMHx significant for pain and kidney stones, presents by private vehicle with  to the ED with cc of nausea and vomiting. Patient has a 5-hour history of nausea and vomiting with no abdominal pain. It [address] for back pain and kidney stones. She has no back pain at this time and no fever. There are no other complaints, changes, or physical findings at this time. PCP: Raf Samuel., NP    Current Outpatient Medications   Medication Sig Dispense Refill    ondansetron (ZOFRAN ODT) 4 mg disintegrating tablet Take 1 Tab by mouth every eight (8) hours as needed for Nausea. 10 Tab 0    ascorbic acid, vitamin C, (VITAMIN C) 500 mg tablet Take  by mouth.  cyanocobalamin 1,000 mcg tablet Take 1,000 mcg by mouth daily.  amLODIPine (NORVASC) 10 mg tablet TAKE 1 TAB BY MOUTH DAILY. For blood pressure 90 Tab 1    clobetasol (TEMOVATE) 0.05 % topical cream Apply  to affected area two (2) times daily as needed for Skin Irritation. eczema 30 g 1    Cetirizine (ZYRTEC) 10 mg cap Take 10 mg by mouth nightly. 90 Cap 3    OTHER 400 mg. Indications: magnesium complex      FLAXSEED OIL (OMEGA 3 PO) Take  by mouth.  cholecalciferol, VITAMIN D3, (VITAMIN D3) 5,000 unit tab tablet Take  by mouth daily.  FOLIC ACID/MULTIVIT-MIN/LUTEIN (CENTRUM SILVER PO) Take  by mouth.  albuterol (PROVENTIL HFA, VENTOLIN HFA, PROAIR HFA) 90 mcg/actuation inhaler Take  by inhalation.  dextroamphetamine-amphetamine (ADDERALL) 20 mg tablet Take 20 mg by mouth two (2) times a day.       cyclobenzaprine (FLEXERIL) 10 mg tablet Take 1 Tab by mouth three (3) times daily as needed for Muscle Spasm(s). 15 Tab 0    HYDROcodone-acetaminophen (NORCO) 5-325 mg per tablet Take 1-2 Tabs by mouth every four (4) hours as needed for Pain for up to 3 days. Max Daily Amount: 12 Tabs. 12 Tab 0       Past History     Past Medical History:  Past Medical History:   Diagnosis Date    Adult ADHD     Depression     Hypercholesterolemia     Hypertension     Rheumatoid arthritis ([address]     Sarcoid     Sinus Infection        Past Surgical History:  Past Surgical History:   Procedure Laterality Date    HX BREAST BIOPSY      ? left breast - benign- no visible scar    HX MOHS PROCEDURES      HX ORTHOPAEDIC      HX PARTIAL HYSTERECTOMY         Family History:  Family History   Problem Relation Age of Onset    Hypertension Mother     Diabetes Mother     Stroke Mother          ~ 67 yo    Heart Disease Father          ~ 80 yo    Breast Cancer Cousin         mat. 1st cousin       Social History:  Social History     Tobacco Use    Smoking status: Never Smoker    Smokeless tobacco: Never Used   Substance Use Topics    Alcohol use: No    Drug use: No       Allergies: Allergies   Allergen Reactions    Latex Rash    Iodine Rash and Other (comments)     Pt states this is due to shellfish allergy. She's not aware, she's allergic to iodine or IV contrast.     Lisinopril Cough    Shellfish Derived Hives    Simvastatin Myalgia         Review of Systems   Review of Systems   Constitutional: Negative for chills and fever. HENT: Negative for congestion, rhinorrhea, sneezing and sore throat. Respiratory: Negative for shortness of breath. Cardiovascular: Negative for chest pain. Gastrointestinal: Negative for abdominal pain, nausea and vomiting. Musculoskeletal: Negative for back pain, myalgias and neck stiffness. Skin: Negative for rash. Neurological: Negative for dizziness, weakness and headaches. All other systems reviewed and are negative.       Physical Exam   Physical Exam   Constitutional: She is oriented to person, place, and time. She appears well-developed and well-nourished. HENT:   Head: Normocephalic and atraumatic. Mouth/Throat: Oropharynx is clear and moist.   Eyes: Conjunctivae and EOM are normal.   Neck: Normal range of motion and full passive range of motion without pain. Neck supple. Cardiovascular: Normal rate, regular rhythm, S1 normal, S2 normal, normal heart sounds, intact distal pulses and normal pulses. No murmur heard. Pulmonary/Chest: Effort normal and breath sounds normal. No respiratory distress. She has no wheezes. Abdominal: Soft. Normal appearance and bowel sounds are normal. She exhibits no distension. There is no tenderness. There is no rebound. Musculoskeletal: Normal range of motion. Neurological: She is alert and oriented to person, place, and time. She has normal strength. Skin: Skin is warm, dry and intact. No rash noted. Psychiatric: She has a normal mood and affect. Her speech is normal and behavior is normal. Judgment and thought content normal.   Nursing note and vitals reviewed. Diagnostic Study Results     Labs -     Recent Results (from the past 12 hour(s))   CBC WITH AUTOMATED DIFF    Collection Time: 03/30/19 10:20 PM   Result Value Ref Range    WBC 8.8 3.6 - 11.0 K/uL    RBC 4.77 3.80 - 5.20 M/uL    HGB 14.4 11.5 - 16.0 g/dL    HCT 43.2 35.0 - 47.0 %    MCV 90.6 80.0 - 99.0 FL    MCH 30.2 26.0 - 34.0 PG    MCHC 33.3 30.0 - 36.5 g/dL    RDW 12.8 11.5 - 14.5 %    PLATELET 153 646 - 989 K/uL    MPV 10.9 8.9 - 12.9 FL    NRBC 0.0 0  WBC    ABSOLUTE NRBC 0.00 0.00 - 0.01 K/uL    NEUTROPHILS 94 (H) 32 - 75 %    LYMPHOCYTES 2 (L) 12 - 49 %    MONOCYTES 4 (L) 5 - 13 %    EOSINOPHILS 0 0 - 7 %    BASOPHILS 0 0 - 1 %    IMMATURE GRANULOCYTES 0 0.0 - 0.5 %    ABS. NEUTROPHILS 8.2 (H) 1.8 - 8.0 K/UL    ABS. LYMPHOCYTES 0.2 (L) 0.8 - 3.5 K/UL    ABS. MONOCYTES 0.4 0.0 - 1.0 K/UL    ABS.  EOSINOPHILS 0.0 0.0 - 0.4 K/UL    ABS. BASOPHILS 0.0 0.0 - 0.1 K/UL    ABS. IMM. GRANS. 0.0 0.00 - 0.04 K/UL    DF SMEAR SCANNED      RBC COMMENTS NORMOCYTIC, NORMOCHROMIC     METABOLIC PANEL, COMPREHENSIVE    Collection Time: 03/30/19 10:20 PM   Result Value Ref Range    Sodium 142 136 - 145 mmol/L    Potassium 4.1 3.5 - 5.1 mmol/L    Chloride 104 97 - 108 mmol/L    CO2 28 21 - 32 mmol/L    Anion gap 10 5 - 15 mmol/L    Glucose 121 (H) 65 - 100 mg/dL    BUN 20 6 - 20 MG/DL    Creatinine 0.88 0.55 - 1.02 MG/DL    BUN/Creatinine ratio 23 (H) 12 - 20      GFR est AA >60 >60 ml/min/1.73m2    GFR est non-AA >60 >60 ml/min/1.73m2    Calcium 9.3 8.5 - 10.1 MG/DL    Bilirubin, total 0.5 0.2 - 1.0 MG/DL    ALT (SGPT) 19 12 - 78 U/L    AST (SGOT) 23 15 - 37 U/L    Alk. phosphatase 142 (H) 45 - 117 U/L    Protein, total 8.0 6.4 - 8.2 g/dL    Albumin 4.0 3.5 - 5.0 g/dL    Globulin 4.0 2.0 - 4.0 g/dL    A-G Ratio 1.0 (L) 1.1 - 2.2     LIPASE    Collection Time: 03/30/19 10:20 PM   Result Value Ref Range    Lipase 82 73 - 393 U/L   URINALYSIS W/ RFLX MICROSCOPIC    Collection Time: 03/30/19 10:20 PM   Result Value Ref Range    Color YELLOW/STRAW      Appearance CLOUDY (A) CLEAR      Specific gravity 1.025 1.003 - 1.030      pH (UA) 7.0 5.0 - 8.0      Protein TRACE (A) NEG mg/dL    Glucose NEGATIVE  NEG mg/dL    Ketone NEGATIVE  NEG mg/dL    Bilirubin NEGATIVE  NEG      Blood NEGATIVE  NEG      Urobilinogen 0.2 0.2 - 1.0 EU/dL    Nitrites NEGATIVE  NEG      Leukocyte Esterase NEGATIVE  NEG     URINE MICROSCOPIC ONLY    Collection Time: 03/30/19 10:20 PM   Result Value Ref Range    WBC 0-4 0 - 4 /hpf    RBC 0-5 0 - 5 /hpf    Epithelial cells FEW FEW /lpf    Bacteria 1+ (A) NEG /hpf       Radiologic Studies -   No orders to display     CT Results  (Last 48 hours)               03/29/19 0610  CT ABD PELV WO CONT Final result    Impression:  IMPRESSION:   Punctate nonobstructing right renal calculi.  No ureteral calculus or urinary   tract obstruction. Narrative:  EXAM: CT ABD PELV WO CONT       INDICATION: left flank pain, hematuria        COMPARISON: Chest CT from April 23, 2008       CONTRAST:  None. TECHNIQUE:    Thin axial images were obtained through the abdomen and pelvis. Coronal and   sagittal reconstructions were generated. Oral contrast was not administered. CT   dose reduction was achieved through use of a standardized protocol tailored for   this examination and automatic exposure control for dose modulation. The absence of intravenous contrast material reduces the sensitivity for   evaluation of the solid parenchymal organs of the abdomen. FINDINGS:    LUNG BASES: Unchanged 4 mm right lower lobe pulmonary nodule. Unchanged 5 mm   right middle lobe pulmonary nodule. INCIDENTALLY IMAGED HEART AND MEDIASTINUM: Unremarkable. LIVER: No mass or biliary dilatation. GALLBLADDER: Unremarkable. SPLEEN: No mass. PANCREAS: No mass or ductal dilatation. ADRENALS: Unremarkable. KIDNEYS/URETERS: No hydronephrosis. 2.9 cm left renal cyst. Punctate   nonobstructing right renal calculi. STOMACH: Unremarkable. SMALL BOWEL: No dilatation or wall thickening. COLON: No dilatation or wall thickening. APPENDIX: Normal.   PERITONEUM: No ascites or pneumoperitoneum. RETROPERITONEUM: No lymphadenopathy or aortic aneurysm. REPRODUCTIVE ORGANS: Status post hysterectomy. URINARY BLADDER: No mass or calculus. BONES: No destructive bone lesion. ADDITIONAL COMMENTS: N/A               CXR Results  (Last 48 hours)    None            Medical Decision Making   I am the first provider for this patient. I reviewed the vital signs, available nursing notes, past medical history, past surgical history, family history and social history. Vital Signs-Reviewed the patient's vital signs.   Patient Vitals for the past 12 hrs:   Temp Pulse Resp BP SpO2   03/30/19 2300 -- 89 18 141/80 94 %   03/30/19 2230 -- 88 18 142/81 95 %   03/30/19 2131 98.3 °F (36.8 °C) 88 18 143/89 97 %         Records Reviewed: Nursing Notes and Old Medical Records    Provider Notes (Medical Decision Making): UTI versus gastroenteritis versus kidney stones    ED Course:   Initial assessment performed. The patients presenting problems have been discussed, and they are in agreement with the care plan formulated and outlined with them. I have encouraged them to ask questions as they arise throughout their visit. Disposition:  Patient informed of results of workup and is comfortable with discharge to home to follow up with PCP. They are instructed to return as needed for worsening condition. PLAN:  1. Discharge Medication List as of 3/30/2019 11:15 PM        2. Follow-up Information     Follow up With Specialties Details Why Contact Info    Shawn Yung., NP Nurse Practitioner Call  [place]  [address] [address] [phone]      [facility] [place] EMERGENCY DEPT Emergency Medicine  As needed, If symptoms worsen Fran Us        Return to ED if worse     Diagnosis     Clinical Impression:   1.  Gastroenteritis, acute

## 2019-03-31 NOTE — ED NOTES
Pt presents to ED ambulatory complaining of vomiting multiple times starting this evening. Pt reports being seen and treated for back pain in ED earlier today and did not have current symptoms. Pt reports she is unable to keep anything down, including liquids. Pt is alert and oriented x 4, RR even and unlabored, skin is warm and dry. Assessment completed and pt updated on plan of care. Emergency Department Nursing Plan of Care       The Nursing Plan of Care is developed from the Nursing assessment and Emergency Department Attending provider initial evaluation. The plan of care may be reviewed in the ED Provider note.     The Plan of Care was developed with the following considerations:   Patient / Family readiness to learn indicated by:verbalized understanding  Persons(s) to be included in education: patient  Barriers to Learning/Limitations:No    Signed     Shannon Joseph RN    3/30/2019   10:33 PM

## 2019-03-31 NOTE — ED NOTES
Discharge instructions were given to the patient by Zina Rutherford RN. The patient left the Emergency Department ambulatory, alert and oriented and in no acute distress with 1 prescriptions. The patient was encouraged to call or return to the ED for worsening issues or problems and was encouraged to schedule a follow up appointment for continuing care. The patient verbalized understanding of discharge instructions and prescriptions, all questions were answered. The patient has no further concerns at this time.

## 2019-04-01 ENCOUNTER — OFFICE VISIT (OUTPATIENT)
Dept: INTERNAL MEDICINE CLINIC | Age: 59
End: 2019-04-01

## 2019-04-01 VITALS
WEIGHT: 191.1 LBS | HEIGHT: 68 IN | BODY MASS INDEX: 28.96 KG/M2 | SYSTOLIC BLOOD PRESSURE: 137 MMHG | OXYGEN SATURATION: 96 % | HEART RATE: 77 BPM | RESPIRATION RATE: 18 BRPM | TEMPERATURE: 98.7 F | DIASTOLIC BLOOD PRESSURE: 87 MMHG

## 2019-04-01 DIAGNOSIS — N20.0 RENAL STONE: ICD-10-CM

## 2019-04-01 DIAGNOSIS — K52.9 GASTROENTERITIS: Primary | ICD-10-CM

## 2019-04-01 NOTE — LETTER
NOTIFICATION RETURN TO WORK / SCHOOL 
 
4/1/2019 2:21 PM 
 
Ms. Umm Guallpa [address] To Whom It May Concern: 
 
Umm Guallpa is currently under the care of [facility] She[facility] Zazueta. She will return to work/school on: 4/8/19. If there are questions or concerns please have the patient contact our office. Sincerely, Rica Beckman NP

## 2019-04-01 NOTE — PROGRESS NOTES
Pt here for   Chief Complaint   Patient presents with   [facility] Follow Up     Nausea and Vomiting     1. Have you been to the ER, urgent care clinic since your last visit? Hospitalized since your last visit? Yes When: [facility]     2. Have you seen or consulted any other health care providers outside of the [facility] since your last visit? Include any pap smears or colon screening.  No         Pt denies pain at this time      3 most recent PHQ Screens 4/1/2019   PHQ Not Done Active Diagnosis of Depression or Bipolar Disorder   Little interest or pleasure in doing things -   Feeling down, depressed, irritable, or hopeless -   Total Score PHQ 2 -

## 2019-04-01 NOTE — PROGRESS NOTES
Subjective: (As above and below)     Chief Complaint   Patient presents with   [facility] Follow Up     Nausea and Vomiting     Júnior Delarosa is a 62y.o. year old female who presents for ED f/u    She presented to ED on 3/29 for back pain (hx of chronic back pain). She was recc to continue mobic which she had already been on, flexeril, short term rx for norco and recc to f/u w/ ortho. She had a CT scan as well which showed non-obstructed right renal stone. She was seen in the office on 3/11 for back pain and expressed interest in returning to ortho, she has not scheduled an appt yet    She was then back in the ED on 3/30 for 5 episodes of vomiting & diarrhea. she was dx w/ gastroenteritis, given zofran. She thinks maybe she ate some bad chicken. Nobody at home sick. She has hot flashes but has chalked that up to menopause. Of note, she has not yet picked up any of the medications from the ED for her back or stomach. Last vomited last night - crackers & gingerale. She has not been keeping anything down. She took pepto bismol this morning. She continues to have diarrhea, some incontinence of stool, no blood in stool        Reviewed PmHx, RxHx, FmHx, SocHx, AllgHx and updated in chart. Family History   Problem Relation Age of Onset    Hypertension Mother     Diabetes Mother     Stroke Mother          ~ 65 yo    Heart Disease Father          ~ 80 yo    Breast Cancer Cousin         mat.  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 No pets in the house. Current Outpatient Medications   Medication Sig    amLODIPine (NORVASC) 10 mg tablet TAKE 1 TAB BY MOUTH DAILY. For blood pressure    ondansetron (ZOFRAN ODT) 4 mg disintegrating tablet Take 1 Tab by mouth every eight (8) hours as needed for Nausea.  cyclobenzaprine (FLEXERIL) 10 mg tablet Take 1 Tab by mouth three (3) times daily as needed for Muscle Spasm(s).  HYDROcodone-acetaminophen (NORCO) 5-325 mg per tablet Take 1-2 Tabs by mouth every four (4) hours as needed for Pain for up to 3 days. Max Daily Amount: 12 Tabs.  ascorbic acid, vitamin C, (VITAMIN C) 500 mg tablet Take  by mouth.  cyanocobalamin 1,000 mcg tablet Take 1,000 mcg by mouth daily.  clobetasol (TEMOVATE) 0.05 % topical cream Apply  to affected area two (2) times daily as needed for Skin Irritation. eczema    Cetirizine (ZYRTEC) 10 mg cap Take 10 mg by mouth nightly.  OTHER 400 mg. Indications: magnesium complex    FLAXSEED OIL (OMEGA 3 PO) Take  by mouth.  cholecalciferol, VITAMIN D3, (VITAMIN D3) 5,000 unit tab tablet Take  by mouth daily.  FOLIC ACID/MULTIVIT-MIN/LUTEIN (CENTRUM SILVER PO) Take  by mouth.  albuterol (PROVENTIL HFA, VENTOLIN HFA, PROAIR HFA) 90 mcg/actuation inhaler Take  by inhalation.  dextroamphetamine-amphetamine (ADDERALL) 20 mg tablet Take 20 mg by mouth two (2) times a day. No current facility-administered medications for this visit. Review of Systems:   Constitutional:    Negative for fever and chills, negative diaphoresis. HEENT:              Negative for neck pain and stiffness.   Eyes:                  Negative for visual disturbance, itching, redness or discharge. Respiratory:        Negative for cough and shortness of breath. Cardiovascular:  Negative for chest pain and palpitations. Gastrointestinal: + for nausea, vomiting, diarrhea. No abd pain. No flank pain  Genitourinary:     Negative for dysuria and frequency. Musculoskeletal: chronic LBP  Skin:                    Negative for rash, masses or lesions. Neurological:       Negative for dizzyness, seizure, loss of consciousness, weakness and numbness. Objective:     Vitals:    04/01/19 1400   BP: 137/87   Pulse: 77   Resp: 18   Temp: 98.7 °F (37.1 °C)   TempSrc: Oral   SpO2: 96%   Weight: 191 lb 1.6 oz (86.7 kg)   Height: 5' 8\" (1.727 m)       Results for orders placed or performed during the hospital encounter of 03/30/19   CBC WITH AUTOMATED DIFF   Result Value Ref Range    WBC 8.8 3.6 - 11.0 K/uL    RBC 4.77 3.80 - 5.20 M/uL    HGB 14.4 11.5 - 16.0 g/dL    HCT 43.2 35.0 - 47.0 %    MCV 90.6 80.0 - 99.0 FL    MCH 30.2 26.0 - 34.0 PG    MCHC 33.3 30.0 - 36.5 g/dL    RDW 12.8 11.5 - 14.5 %    PLATELET 051 611 - 701 K/uL    MPV 10.9 8.9 - 12.9 FL    NRBC 0.0 0  WBC    ABSOLUTE NRBC 0.00 0.00 - 0.01 K/uL    NEUTROPHILS 94 (H) 32 - 75 %    LYMPHOCYTES 2 (L) 12 - 49 %    MONOCYTES 4 (L) 5 - 13 %    EOSINOPHILS 0 0 - 7 %    BASOPHILS 0 0 - 1 %    IMMATURE GRANULOCYTES 0 0.0 - 0.5 %    ABS. NEUTROPHILS 8.2 (H) 1.8 - 8.0 K/UL    ABS. LYMPHOCYTES 0.2 (L) 0.8 - 3.5 K/UL    ABS. MONOCYTES 0.4 0.0 - 1.0 K/UL    ABS. EOSINOPHILS 0.0 0.0 - 0.4 K/UL    ABS. BASOPHILS 0.0 0.0 - 0.1 K/UL    ABS. IMM.  GRANS. 0.0 0.00 - 0.04 K/UL    DF SMEAR SCANNED      RBC COMMENTS NORMOCYTIC, NORMOCHROMIC     METABOLIC PANEL, COMPREHENSIVE   Result Value Ref Range    Sodium 142 136 - 145 mmol/L    Potassium 4.1 3.5 - 5.1 mmol/L    Chloride 104 97 - 108 mmol/L    CO2 28 21 - 32 mmol/L    Anion gap 10 5 - 15 mmol/L    Glucose 121 (H) 65 - 100 mg/dL    BUN 20 6 - 20 MG/DL    Creatinine 0. 88 0.55 - 1.02 MG/DL    BUN/Creatinine ratio 23 (H) 12 - 20      GFR est AA >60 >60 ml/min/1.73m2    GFR est non-AA >60 >60 ml/min/1.73m2    Calcium 9.3 8.5 - 10.1 MG/DL    Bilirubin, total 0.5 0.2 - 1.0 MG/DL    ALT (SGPT) 19 12 - 78 U/L    AST (SGOT) 23 15 - 37 U/L    Alk. phosphatase 142 (H) 45 - 117 U/L    Protein, total 8.0 6.4 - 8.2 g/dL    Albumin 4.0 3.5 - 5.0 g/dL    Globulin 4.0 2.0 - 4.0 g/dL    A-G Ratio 1.0 (L) 1.1 - 2.2     LIPASE   Result Value Ref Range    Lipase 82 73 - 393 U/L   URINALYSIS W/ RFLX MICROSCOPIC   Result Value Ref Range    Color YELLOW/STRAW      Appearance CLOUDY (A) CLEAR      Specific gravity 1.025 1.003 - 1.030      pH (UA) 7.0 5.0 - 8.0      Protein TRACE (A) NEG mg/dL    Glucose NEGATIVE  NEG mg/dL    Ketone NEGATIVE  NEG mg/dL    Bilirubin NEGATIVE  NEG      Blood NEGATIVE  NEG      Urobilinogen 0.2 0.2 - 1.0 EU/dL    Nitrites NEGATIVE  NEG      Leukocyte Esterase NEGATIVE  NEG     URINE MICROSCOPIC ONLY   Result Value Ref Range    WBC 0-4 0 - 4 /hpf    RBC 0-5 0 - 5 /hpf    Epithelial cells FEW FEW /lpf    Bacteria 1+ (A) NEG /hpf         Gen: Oriented to person, place and time and well-developed, well-nourished and in no distress. HEENT:    Head: normocephalic and atraumatic. Eyes:  EOM are normal. Pupils equal and round. Cardiovascular: normal rate, regular rhythm and normal heart sounds. Pulmonary/Chest:  Effort normal and breath sounds normal.  No respiratory distress. No wheezes, no rales. Abdominal: soft, normal  bowel sounds. Neurological:  Alert, oriented to person, place and time. Skin: skin is warm and dry. Assessment/ Plan:     Follow-up and Dispositions    · Return if symptoms worsen or fail to improve. she will call ortho to f/u    1. Gastroenteritis  Get zofran per ED rx. Hopefully this allows her to get some fluids/broth in. Can use immodium but very sparingly. ED if severe worsening/unable to get anything down.       2. Renal stone    - REFERRAL TO UROLOGY        I have discussed the diagnosis with the patient and the intended plan as seen in the above orders. The patient has received an after-visit summary and questions were answered concerning future plans. Pt conveyed understanding of plan. Medication Side Effects and Warnings were discussed with patient: yes  Patient Labs were reviewed: yes  Patient Past Records were reviewed:  yes    Veronica Mayorga.  Chelle Renee NP

## 2019-04-19 ENCOUNTER — CLINICAL SUPPORT (OUTPATIENT)
Dept: INTERNAL MEDICINE CLINIC | Age: 59
End: 2019-04-19

## 2019-04-19 VITALS
SYSTOLIC BLOOD PRESSURE: 136 MMHG | DIASTOLIC BLOOD PRESSURE: 88 MMHG | OXYGEN SATURATION: 94 % | HEART RATE: 74 BPM | TEMPERATURE: 97.8 F | WEIGHT: 189.6 LBS | HEIGHT: 68 IN | BODY MASS INDEX: 28.73 KG/M2 | RESPIRATION RATE: 18 BRPM

## 2019-04-19 DIAGNOSIS — I10 ESSENTIAL HYPERTENSION: Primary | ICD-10-CM

## 2019-04-19 RX ORDER — ALBUTEROL SULFATE 90 UG/1
1 AEROSOL, METERED RESPIRATORY (INHALATION)
Qty: 1 INHALER | Refills: 3 | Status: SHIPPED | OUTPATIENT
Start: 2019-04-19 | End: 2020-03-19 | Stop reason: SDUPTHER

## 2019-04-19 NOTE — PROGRESS NOTES
Chief Complaint   Patient presents with    Blood Pressure Check     1. Have you been to the ER, urgent care clinic since your last visit? Hospitalized since your last visit? No    2. Have you seen or consulted any other health care providers outside of the [address] [place] [address] since your last visit? Include any pap smears or colon screening.  No

## 2019-05-02 ENCOUNTER — HOSPITAL ENCOUNTER (OUTPATIENT)
Dept: GENERAL RADIOLOGY | Age: 59
Discharge: HOME | End: 2019-05-02
Payer: MEDICAID

## 2019-05-02 DIAGNOSIS — D86.9 SARCOIDOSIS: ICD-10-CM

## 2019-05-02 PROCEDURE — 71046 X-RAY EXAM CHEST 2 VIEWS: CPT

## 2019-05-04 ENCOUNTER — HOSPITAL ENCOUNTER (OUTPATIENT)
Dept: CT IMAGING | Age: 59
Discharge: HOME | End: 2019-05-04
Attending: NURSE PRACTITIONER
Payer: MEDICAID

## 2019-05-04 ENCOUNTER — HOSPITAL ENCOUNTER (OUTPATIENT)
Dept: CT IMAGING | Age: 59
Discharge: HOME | End: 2019-05-04
Attending: INTERNAL MEDICINE
Payer: MEDICAID

## 2019-05-04 DIAGNOSIS — N28.1 RENAL CYST: ICD-10-CM

## 2019-05-04 DIAGNOSIS — R91.8 MULTIPLE LUNG NODULES: ICD-10-CM

## 2019-05-04 PROCEDURE — 74011636320 HC RX REV CODE- 636/320

## 2019-05-04 PROCEDURE — 74178 CT ABD&PLV WO CNTR FLWD CNTR: CPT

## 2019-05-04 PROCEDURE — 71260 CT THORAX DX C+: CPT

## 2019-05-04 PROCEDURE — 74011000258 HC RX REV CODE- 258: Performed by: INTERNAL MEDICINE

## 2019-05-04 RX ORDER — SODIUM CHLORIDE 0.9 % (FLUSH) 0.9 %
10 SYRINGE (ML) INJECTION
Status: COMPLETED | OUTPATIENT
Start: 2019-05-04 | End: 2019-05-04

## 2019-05-04 RX ADMIN — SODIUM CHLORIDE 100 ML: 900 INJECTION, SOLUTION INTRAVENOUS at 10:43

## 2019-05-04 RX ADMIN — IOPAMIDOL 100 ML: 612 INJECTION, SOLUTION INTRAVENOUS at 10:43

## 2019-05-04 RX ADMIN — Medication 10 ML: at 10:43

## 2019-06-13 ENCOUNTER — OFFICE VISIT (OUTPATIENT)
Dept: INTERNAL MEDICINE CLINIC | Age: 59
End: 2019-06-13

## 2019-06-13 VITALS
BODY MASS INDEX: 29.12 KG/M2 | WEIGHT: 192.1 LBS | RESPIRATION RATE: 18 BRPM | TEMPERATURE: 98.2 F | SYSTOLIC BLOOD PRESSURE: 140 MMHG | HEIGHT: 68 IN | OXYGEN SATURATION: 96 % | HEART RATE: 79 BPM | DIASTOLIC BLOOD PRESSURE: 83 MMHG

## 2019-06-13 DIAGNOSIS — Z79.52 LONG TERM SYSTEMIC STEROID USER: ICD-10-CM

## 2019-06-13 DIAGNOSIS — M06.9 RHEUMATOID ARTHRITIS, INVOLVING UNSPECIFIED SITE, UNSPECIFIED RHEUMATOID FACTOR PRESENCE: ICD-10-CM

## 2019-06-13 DIAGNOSIS — F33.41 RECURRENT MAJOR DEPRESSIVE DISORDER, IN PARTIAL REMISSION (HCC): ICD-10-CM

## 2019-06-13 DIAGNOSIS — Z78.0 POST-MENOPAUSAL: Primary | ICD-10-CM

## 2019-06-13 DIAGNOSIS — Z13.820 OSTEOPOROSIS SCREENING: ICD-10-CM

## 2019-06-13 DIAGNOSIS — F90.2 ATTENTION DEFICIT HYPERACTIVITY DISORDER (ADHD), COMBINED TYPE: ICD-10-CM

## 2019-06-13 RX ORDER — DEXAMETHASONE 4 MG/1
TABLET ORAL
Refills: 3 | COMMUNITY
Start: 2019-05-30 | End: 2022-09-15

## 2019-06-13 RX ORDER — DEXTROAMPHETAMINE SACCHARATE, AMPHETAMINE ASPARTATE, DEXTROAMPHETAMINE SULFATE AND AMPHETAMINE SULFATE 5; 5; 5; 5 MG/1; MG/1; MG/1; MG/1
20 TABLET ORAL 2 TIMES DAILY
Qty: 60 TAB | Refills: 0 | Status: SHIPPED | OUTPATIENT
Start: 2019-06-13 | End: 2019-06-13 | Stop reason: SDUPTHER

## 2019-06-13 RX ORDER — PREDNISONE 5 MG/1
TABLET ORAL
Refills: 0 | COMMUNITY
Start: 2019-06-06 | End: 2019-12-10 | Stop reason: ALTCHOICE

## 2019-06-13 RX ORDER — BUPROPION HYDROCHLORIDE 150 MG/1
150 TABLET ORAL
Qty: 30 TAB | Refills: 3 | Status: SHIPPED | OUTPATIENT
Start: 2019-06-13 | End: 2019-07-08 | Stop reason: SDUPTHER

## 2019-06-13 RX ORDER — FLUTICASONE PROPIONATE 50 MCG
SPRAY, SUSPENSION (ML) NASAL
Refills: 6 | COMMUNITY
Start: 2019-05-30

## 2019-06-13 RX ORDER — DEXTROAMPHETAMINE SACCHARATE, AMPHETAMINE ASPARTATE, DEXTROAMPHETAMINE SULFATE AND AMPHETAMINE SULFATE 5; 5; 5; 5 MG/1; MG/1; MG/1; MG/1
20 TABLET ORAL 2 TIMES DAILY
Qty: 60 TAB | Refills: 0 | Status: SHIPPED | OUTPATIENT
Start: 2019-06-13 | End: 2021-04-22 | Stop reason: SDUPTHER

## 2019-06-13 NOTE — PROGRESS NOTES
Pt here for   Chief Complaint   Patient presents with    Follow-up     Post othro referral and pt saw pulmonology    Hypertension     1. Have you been to the ER, urgent care clinic since your last visit? Hospitalized since your last visit? No    2. Have you seen or consulted any other health care providers outside of the [address] [place] [address] since your last visit? Include any pap smears or colon screening.  No       Pt c/o of pain 6 of 10, Pt denies taking anything for pain today      3 most recent PHQ Screens 6/13/2019   PHQ Not Done Medical Reason (indicate in comments)   Little interest or pleasure in doing things -   Feeling down, depressed, irritable, or hopeless -   Total Score PHQ 2 -

## 2019-06-17 ENCOUNTER — DOCUMENTATION ONLY (OUTPATIENT)
Dept: INTERNAL MEDICINE CLINIC | Age: 59
End: 2019-06-17

## 2019-06-17 NOTE — PROGRESS NOTES
Called and informed  pharmacy of Med PA denial for dextroamphetamine-amphetamine (ADDERALL) 20 mg tablet. Advised if medication is not changed to anything different by provider and medication is still needed that medication would need to be covered for as an out of pocket expense. Pharmacy verbalized understanding of information and instructions given and stated that they will notify pt. Provider will be informed by faxed determination.

## 2019-06-19 LAB — DRUGS UR: NORMAL

## 2019-06-24 ENCOUNTER — TELEPHONE (OUTPATIENT)
Dept: INTERNAL MEDICINE CLINIC | Age: 59
End: 2019-06-24

## 2019-06-24 NOTE — TELEPHONE ENCOUNTER
Pt states she called ins co ref to the Adderall and they told her they faxed over form to you for ov notes on 6/17/19 and haven ot received it. She states she is not feeling well and will pay for medication  This time. She is also going to try to schedule appt with [facility] and will call back with date and time .

## 2019-06-27 ENCOUNTER — HOSPITAL ENCOUNTER (OUTPATIENT)
Dept: BONE DENSITY | Age: 59
Discharge: HOME | End: 2019-06-27
Attending: NURSE PRACTITIONER
Payer: MEDICAID

## 2019-06-27 DIAGNOSIS — Z78.0 POST-MENOPAUSAL: ICD-10-CM

## 2019-06-27 DIAGNOSIS — Z13.820 OSTEOPOROSIS SCREENING: ICD-10-CM

## 2019-06-27 DIAGNOSIS — Z79.52 LONG TERM SYSTEMIC STEROID USER: ICD-10-CM

## 2019-06-27 PROCEDURE — 77080 DXA BONE DENSITY AXIAL: CPT

## 2019-07-01 PROBLEM — M85.80 OSTEOPENIA: Status: ACTIVE | Noted: 2019-07-01

## 2019-08-23 ENCOUNTER — DOCUMENTATION ONLY (OUTPATIENT)
Dept: INTERNAL MEDICINE CLINIC | Age: 59
End: 2019-08-23

## 2019-08-23 NOTE — PROGRESS NOTES
Med PA request  for   dextroamphetamine-amphetamine (ADDERALL) 20 mg tablet Has been processed and pending medical review.

## 2019-08-23 NOTE — PROGRESS NOTES
Called/ faxed and notified pharmacy for med PA approval for  dextroamphetamine-amphetamine (ADDERALL) 20 mg tablet . Provider informed.

## 2019-09-03 DIAGNOSIS — I10 ESSENTIAL HYPERTENSION WITH GOAL BLOOD PRESSURE LESS THAN 140/90: ICD-10-CM

## 2019-09-03 RX ORDER — AMLODIPINE BESYLATE 10 MG/1
TABLET ORAL
Qty: 90 TAB | Refills: 1 | Status: SHIPPED | OUTPATIENT
Start: 2019-09-03 | End: 2020-02-19

## 2019-12-10 ENCOUNTER — OFFICE VISIT (OUTPATIENT)
Dept: INTERNAL MEDICINE CLINIC | Age: 59
End: 2019-12-10

## 2019-12-10 VITALS
HEART RATE: 78 BPM | BODY MASS INDEX: 28.64 KG/M2 | OXYGEN SATURATION: 97 % | WEIGHT: 189 LBS | HEIGHT: 68 IN | RESPIRATION RATE: 18 BRPM | DIASTOLIC BLOOD PRESSURE: 85 MMHG | SYSTOLIC BLOOD PRESSURE: 118 MMHG | TEMPERATURE: 97.9 F

## 2019-12-10 DIAGNOSIS — G89.29 CHRONIC MIDLINE LOW BACK PAIN WITHOUT SCIATICA: ICD-10-CM

## 2019-12-10 DIAGNOSIS — D86.9 SARCOID: ICD-10-CM

## 2019-12-10 DIAGNOSIS — Z12.11 COLON CANCER SCREENING: ICD-10-CM

## 2019-12-10 DIAGNOSIS — M06.9 RHEUMATOID ARTHRITIS INVOLVING MULTIPLE SITES, UNSPECIFIED RHEUMATOID FACTOR PRESENCE: ICD-10-CM

## 2019-12-10 DIAGNOSIS — M54.50 CHRONIC MIDLINE LOW BACK PAIN WITHOUT SCIATICA: ICD-10-CM

## 2019-12-10 DIAGNOSIS — I10 ESSENTIAL HYPERTENSION: Primary | ICD-10-CM

## 2019-12-10 DIAGNOSIS — M79.89 SWELLING OF RIGHT HAND: ICD-10-CM

## 2019-12-10 PROBLEM — Z90.710 H/O: HYSTERECTOMY: Status: ACTIVE | Noted: 2019-12-10

## 2019-12-10 RX ORDER — MONTELUKAST SODIUM 10 MG/1
TABLET ORAL
Refills: 4 | COMMUNITY
Start: 2019-11-14 | End: 2022-09-15

## 2019-12-10 RX ORDER — PREDNISONE 5 MG/1
TABLET ORAL
Qty: 21 TAB | Refills: 0 | Status: SHIPPED | OUTPATIENT
Start: 2019-12-10 | End: 2020-02-13 | Stop reason: ALTCHOICE

## 2019-12-10 RX ORDER — IBUPROFEN 800 MG/1
800 TABLET ORAL
Qty: 60 TAB | Refills: 0 | Status: SHIPPED | OUTPATIENT
Start: 2019-12-10 | End: 2020-01-21 | Stop reason: SDUPTHER

## 2019-12-10 RX ORDER — TRIAMCINOLONE ACETONIDE 1 MG/G
OINTMENT TOPICAL 2 TIMES DAILY
Qty: 30 G | Refills: 0 | Status: SHIPPED | OUTPATIENT
Start: 2019-12-10 | End: 2021-04-22 | Stop reason: SDUPTHER

## 2019-12-10 RX ORDER — CYCLOBENZAPRINE HCL 10 MG
10 TABLET ORAL
Qty: 30 TAB | Refills: 0 | Status: SHIPPED | OUTPATIENT
Start: 2019-12-10 | End: 2020-01-26

## 2019-12-10 NOTE — PROGRESS NOTES
Subjective: (As above and below)     Chief Complaint   Patient presents with    Hip Pain    Hand Pain     Right hand    Rash    Hand Swelling     RIght     Morgan Weldon is a 61y.o. year old female who presents for f/u on multiple chronic conditions    Hypertension ROS:  taking medications as instructed, no medication side effects noted, no TIAs, no chest pain on exertion, no dyspnea on exertion, no swelling of ankles    ADD/dysthymia: was on adderal. - has not taken in some time- she feels that it helps but is mostly okay w/o it  Last fill 2019  Has been taking welbutrin - but admits to taking only occasionally    Sarcoidosis: followed by pulm- recently put on advair- insurance pending approval. Has f/u in 3? months    Renal cyst: simple, saw urology - no further w/u recc    Chronic low back pain: at last visit she was waiting on PT approval that was ordered by her ortho, she states it was not approved and she never went back to ortho bc of this. She continues to have LBP. Has responded to prednisone in the past. Denies saddle numbness, leg weakness, falls or bowel/bladder dysfunction. Rheumatoid arthritis: not currently followed by rheum as she had not been having flares? Previously saw Dr. Patricio Rm she is complaining of R hand swelling for a few days. No inciting injury. It is painful. No known hx of gout    Colonoscopy: still needs this done    Rash: gets intermittent rash to arms, pruritic- pulm believes r/t sarcoid. Responds to prednisone and topical steroids      Reviewed PmHx, RxHx, FmHx, SocHx, AllgHx and updated in chart. Family History   Problem Relation Age of Onset    Hypertension Mother     Diabetes Mother     Stroke Mother          ~ 65 yo    Heart Disease Father          ~ 78 yo    Breast Cancer Cousin         mat.  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 No pets in the house. Current Outpatient Medications   Medication Sig    montelukast (SINGULAIR) 10 mg tablet TAKE 1 TABLET BY MOUTH EVERY DAY    triamcinolone acetonide (KENALOG) 0.1 % ointment Apply  to affected area two (2) times a day. use thin layer    ibuprofen (MOTRIN) 800 mg tablet Take 1 Tab by mouth every eight (8) hours as needed for Pain.  cyclobenzaprine (FLEXERIL) 10 mg tablet Take 1 Tab by mouth nightly as needed for Muscle Spasm(s).  predniSONE (STERAPRED) 5 mg dose pack See administration instruction per 5mg dose pack    amLODIPine (NORVASC) 10 mg tablet TAKE 1 TAB BY MOUTH DAILY. For blood pressure    buPROPion XL (WELLBUTRIN XL) 150 mg tablet Take 1 Tab by mouth every morning. depression    FLOVENT  mcg/actuation inhaler TAKE 2 PUFFS BY MOUTH TWICE DAILY    fluticasone propionate (FLONASE) 50 mcg/actuation nasal spray USE 2 SPRAYS IN EACH NOSTRIL TWICE A DAY    dextroamphetamine-amphetamine (ADDERALL) 20 mg tablet Take 1 Tab by mouth two (2) times a day. Max Daily Amount: 40 mg.    albuterol (PROVENTIL HFA, VENTOLIN HFA, PROAIR HFA) 90 mcg/actuation inhaler Take 1 Puff by inhalation every six (6) hours as needed for Wheezing.     ascorbic acid, vitamin C, (VITAMIN C) 500 mg tablet Take  by mouth.    cyanocobalamin 1,000 mcg tablet Take 1,000 mcg by mouth daily.  clobetasol (TEMOVATE) 0.05 % topical cream Apply  to affected area two (2) times daily as needed for Skin Irritation. eczema    Cetirizine (ZYRTEC) 10 mg cap Take 10 mg by mouth nightly.  cholecalciferol, VITAMIN D3, (VITAMIN D3) 5,000 unit tab tablet Take  by mouth daily.  FOLIC ACID/MULTIVIT-MIN/LUTEIN (CENTRUM SILVER PO) Take  by mouth.  FLAXSEED OIL (OMEGA 3 PO) Take  by mouth. No current facility-administered medications for this visit. Review of Systems:   Constitutional:    Negative for fever and chills, negative diaphoresis. HEENT:              Negative for neck pain and stiffness. Eyes:                  Negative for visual disturbance, itching, redness or discharge. Respiratory:        Negative for cough and shortness of breath. Cardiovascular:  Negative for chest pain and palpitations. Gastrointestinal: Negative for nausea, vomiting, abdominal pain, diarrhea or constipation. Genitourinary:     Negative for dysuria and frequency. Musculoskeletal: r hand swelling  Skin:                    rash  Neurological:       Negative for dizzyness, seizure, loss of consciousness, weakness and numbness. Objective:     Vitals:    12/10/19 0806   BP: 118/85   Pulse: 78   Resp: 18   Temp: 97.9 °F (36.6 °C)   TempSrc: Oral   SpO2: 97%   Weight: 189 lb (85.7 kg)   Height: 5' 8\" (1.727 m)       Results for orders placed or performed in visit on 06/13/19   COMPLIANCE DRUG SCREEN/PRESCRIPTION MONITORING   Result Value Ref Range    Summary FINAL          Physical Examination: General appearance - alert, well appearing, and in no distress and overweight  Chest - clear to auscultation, no wheezes, rales or rhonchi, symmetric air entry  Heart - normal rate, regular rhythm, normal S1, S2, no murmurs, rubs, clicks or gallops  Extremities - no pedal edema noted  Skin - flat patches to arms/hands.  No cellulitis  R hand: no warmth/redness. It does appear swollen compared to L hand.  strength 4/5    Assessment/ Plan:     Follow-up and Dispositions    · Return in about 6 months (around 6/10/2020). 1. Essential hypertension    - METABOLIC PANEL, BASIC    2. Chronic midline low back pain without sciatica    - REFERRAL TO PHYSICAL THERAPY  - ibuprofen (MOTRIN) 800 mg tablet; Take 1 Tab by mouth every eight (8) hours as needed for Pain. Dispense: 60 Tab; Refill: 0  - cyclobenzaprine (FLEXERIL) 10 mg tablet; Take 1 Tab by mouth nightly as needed for Muscle Spasm(s). Dispense: 30 Tab; Refill: 0    3. Colon cancer screening    - REFERRAL TO GASTROENTEROLOGY    4. Rheumatoid arthritis involving multiple sites, unspecified rheumatoid factor presence (HCC)    - REFERRAL TO RHEUMATOLOGY  - predniSONE (STERAPRED) 5 mg dose pack; See administration instruction per 5mg dose pack  Dispense: 21 Tab; Refill: 0    5. Swelling of right hand  Encouraged pt to resume care w/ rheum  Has not had systemic steroids in several months  - URIC ACID  - predniSONE (STERAPRED) 5 mg dose pack; See administration instruction per 5mg dose pack  Dispense: 21 Tab; Refill: 0    6. Sarcoid  Cont care w/ pulm        I have discussed the diagnosis with the patient and the intended plan as seen in the above orders. The patient has received an after-visit summary and questions were answered concerning future plans. Pt conveyed understanding of plan. Medication Side Effects and Warnings were discussed with patient: yes  Patient Labs were reviewed: yes  Patient Past Records were reviewed:  yes    Elio Garcia.  Foster Caba NP

## 2019-12-10 NOTE — PROGRESS NOTES
Pt here for   Chief Complaint   Patient presents with    Hip Pain    Hand Pain     Right hand    Rash    Hand Swelling     RIght     1. Have you been to the ER, urgent care clinic since your last visit? Hospitalized since your last visit? No    2. Have you seen or consulted any other health care providers outside of the [address] [place] [address] since your last visit? Include any pap smears or colon screening.  No      Pt c/o pain 10 of 10, Pt denies taking anything for pain today    3 most recent PHQ Screens 12/10/2019   PHQ Not Done Active Diagnosis of Depression or Bipolar Disorder   Little interest or pleasure in doing things -   Feeling down, depressed, irritable, or hopeless -   Total Score PHQ 2 -

## 2019-12-10 NOTE — LETTER
NOTIFICATION RETURN TO WORK / SCHOOL 
 
12/10/2019 8:36 AM 
 
Ms. Harpal Bryan [address] To Whom It May Concern: 
 
Harpal Bryan is currently under the care of [facility]. She will return to work/school on: 12/16/19. If there are questions or concerns please have the patient contact our office. Sincerely, Rica Goldman NP

## 2019-12-11 LAB
BUN SERPL-MCNC: 14 MG/DL (ref 6–24)
BUN/CREAT SERPL: 14 (ref 9–23)
CALCIUM SERPL-MCNC: 9.8 MG/DL (ref 8.7–10.2)
CHLORIDE SERPL-SCNC: 102 MMOL/L (ref 96–106)
CO2 SERPL-SCNC: 23 MMOL/L (ref 20–29)
CREAT SERPL-MCNC: 1.01 MG/DL (ref 0.57–1)
GLUCOSE SERPL-MCNC: 98 MG/DL (ref 65–99)
POTASSIUM SERPL-SCNC: 4.4 MMOL/L (ref 3.5–5.2)
SODIUM SERPL-SCNC: 140 MMOL/L (ref 134–144)
URATE SERPL-MCNC: 5.5 MG/DL (ref 2.5–7.1)

## 2019-12-19 ENCOUNTER — HOSPITAL ENCOUNTER (OUTPATIENT)
Dept: PHYSICAL THERAPY | Age: 59
Discharge: HOME | End: 2019-12-19
Payer: MEDICAID

## 2019-12-19 PROCEDURE — 97110 THERAPEUTIC EXERCISES: CPT | Performed by: PHYSICAL THERAPIST

## 2019-12-19 PROCEDURE — 97161 PT EVAL LOW COMPLEX 20 MIN: CPT | Performed by: PHYSICAL THERAPIST
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
## 2019-12-26 ENCOUNTER — HOSPITAL ENCOUNTER (OUTPATIENT)
Dept: PHYSICAL THERAPY | Age: 59
Discharge: HOME | End: 2019-12-26
Payer: MEDICAID

## 2019-12-26 PROCEDURE — 97110 THERAPEUTIC EXERCISES: CPT
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
## 2019-12-30 ENCOUNTER — TELEPHONE (OUTPATIENT)
Dept: INTERNAL MEDICINE CLINIC | Age: 59
End: 2019-12-30

## 2019-12-30 NOTE — TELEPHONE ENCOUNTER
12/30/19 Faxed to [phone], Request form, Last offices notes and demographic information to [phone], Transaction Complete, Pt was notified @ [phone] Saira Tineo LPN

## 2019-12-30 NOTE — TELEPHONE ENCOUNTER
Pt called today stating  She attempted to make appt at [facility] for colonoscopy. She needs you to fax  the form for this to [phone] she has not had one done in several years.     Pt # [phone]

## 2020-01-02 ENCOUNTER — HOSPITAL ENCOUNTER (OUTPATIENT)
Dept: PHYSICAL THERAPY | Age: 60
Discharge: HOME | End: 2020-01-02
Payer: MEDICAID

## 2020-01-02 PROCEDURE — 97110 THERAPEUTIC EXERCISES: CPT | Performed by: PHYSICAL THERAPIST

## 2020-01-02 NOTE — PROGRESS NOTES
[address]    OUTPATIENT PHYSICAL THERAPY DAILY TREATMENT NOTE  VISIT: 3    NAME: Gabriella Thompson AGE: 61 y.o. GENDER: female  DATE: 1/2/2020  REFERRING PHYSICIAN: Pascual Valdez., *      GOALS  Short term goals  Time frame: 4 weeks  1. Patient will be compliant and independent with the initial HEP as evidenced by being able to perform without cuing. 2. Patient will report a 25% improvement in symptoms. 3. Patient report a 25% improvement in sleeping. 4. Patient will have an increased tolerance for standing to allow 90 minutes of the activity before symptoms start. 5. Patient will tolerate 10 minutes of clinic activities before increase of symptoms. Long term goals  Time frame: 8 weeks  1. Patient will report pain level decrease to 2/10 to allow increased ease of movement. 2. Patient will have an improved score on the [facility] Angie outcome measure by 5 points to demonstrate an increase in functional activity tolerance. 3. Patient will be independent in final individualized HEP. 4. Patient will have an increase in left hamstring ROM to 70 degrees to allow performance of work tasks. 5. Patient will have an increase in hip abductor strength to 4+/5 to allow performance of work tasks. 6. Patient will return to walking without being limited by symptoms. 7. Patient will sleep 6-8 hours without being interrupted by pain. SUBJECTIVE:   \"I'm feeling better. \"    Pain In: 4/10 mid to low back     OBJECTIVE DATA SUMMARY:   Objective data from initial evaluation:  Pain:  Location: Low back  Quality: throbbing  Now: 6/10  Best: 4/10  Worst: 10/10 when back \"locks up\"  Factors that improve pain: rest, medication: used and beneficial    Posture:   Rounded shoulders    Strength:      LEFT  RIGHT  Hip flexion  4/5  4/5  Hip abduction  4/5  4/5    Range of Motion:   Lumbar Spine (AROM)  (*Measured 3rd finger from the floor)  Flexion*  44 cm; pain; needs to use hands on thighs method to return to upright  Extension WNL; pain relief; however repeated extension increased pain  R side bend* 56 cm; tightness on right but not as bad as left   L side bend* 58 cm; tightness on left  R rotation WNL; stretch  L rotation WNL; stretch    Left hamstrin degrees  Right hamstrin degrees  Tightness in bilateral piriformis and hip adductors     Joint Mobility:   Unable to test lumbar spine joint mobility secondary to pain  Some relief with long axis joint distraction    Palpation:   Tender at bilateral thoracic and lumbar paraspinals, TFL/ITband, and gluteals    Neurologic Assessment:   Tone: Normal   Sensation: Intact   Reflexes: NT    Special Tests:   (-) Slump test    Mobility:   Transitional Movements: Increased time and effort to perform bed mobility and transfers   Gait: WNL    Balance:   WNL    Functional Measure:   María Carbo:      TREATMENT/INTERVENTION:  Modalities (Rationale): to decrease pain and muscle guarding  MHP to low back for 8 minutes in sitting at end of session; no skin irritation noted -Held this date    Therapeutic Exercises: to develop strength, endurance, range of motion, and flexibility  Initial HEP provided 19: Trunk flexion stretch; Trunk extension stretch; trunk rotation stretch; trunk SB stretch, seated piriformis stretch, hip adductor stretch; standing hip abduction and extension    Exercises in BOLD performed this date:    Supine: *very limited tolerance to supine position  Hip adductor stretch: 30 sec holds B  LTR 5 reps  SKTC 5 reps B    Seated:   Trunk rotation stretch: 5 reps B  Trunk SB stretch: 5 reps B  Piriformis stretch: 2 reps with 30 sec holds B  Hamstring stretch on stool: 2 reps with 30 sec hold B  Forward flexion over blue physioball: 2 reps (then too uncomfortable in mid back to continue)  Flexion/ext over back of chair x 5 reps (difficult for pt)  Seated P-A pelvic tilt   5 reps    Standing:   Hip extension: 10 reps  Hip abduction: 10 reps  Trunk extension stretch: 3 reps     Manual Therapy: for joint mobilization/manipulations and soft tissue mobilization  None this date    Neuro Re-Education: to improve movement, balance, coordination, kinesthetic sense, posture, and proprioception for sitting or standing balance  None this date    Activity tolerance and post treatment pain report:   Poor  Pain Out: 4/10    Education:  Education was provided to the patient on the following topics: Importance of exercises . [x]    No changes were made to the home exercise program.  []    The following changes were made to the home exercise program:   Patient verbalized understanding of the topics presented. ASSESSMENT:   Patient presents with 4/10 pain in low back this date. Patient continues to report mid to low back pain, and occasional sharp spasm like pains which cause her to stop her current activity. Patient reports performing her exercises at home. She had relatively poor tolerance to clinic exercises this date. Increased rest breaks provided. Pain with trunk flexion stretch over blue physioball today, which was her most comfortable stretch last session. 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 Patients progression toward goals is as follows:  [x]     Improving appropriately and progressing toward goals  []     Improving slowly and progressing toward goals  []     Not making progress toward goals and plan of care will be adjusted    PLAN OF CARE:   Patient continues to benefit from skilled intervention to address the above impairments. [x]    Continue treatment per established plan of care.   []     Recommend the following changes to the plan of care:     Recommendations/Intent for next treatment: UBE/BELKIS,  FRANCY Westbrook PT   Time Calculation: 18 mins  Patient Time in clinic:   Start Time: 9249   Stop Time: 26

## 2020-01-09 ENCOUNTER — HOSPITAL ENCOUNTER (OUTPATIENT)
Dept: PHYSICAL THERAPY | Age: 60
Discharge: HOME | End: 2020-01-09
Payer: MEDICAID

## 2020-01-09 PROCEDURE — 97110 THERAPEUTIC EXERCISES: CPT | Performed by: PHYSICAL THERAPIST

## 2020-01-09 NOTE — PROGRESS NOTES
[address]    OUTPATIENT PHYSICAL THERAPY DAILY TREATMENT NOTE  VISIT: 4    NAME: Raji Lozoya AGE: 61 y.o. GENDER: female  DATE: 1/9/2020  REFERRING PHYSICIAN: Vinita Carlos., *      GOALS  Short term goals  Time frame: 4 weeks  1. Patient will be compliant and independent with the initial HEP as evidenced by being able to perform without cuing. 2. Patient will report a 25% improvement in symptoms. 3. Patient report a 25% improvement in sleeping. 4. Patient will have an increased tolerance for standing to allow 90 minutes of the activity before symptoms start. 5. Patient will tolerate 10 minutes of clinic activities before increase of symptoms. Long term goals  Time frame: 8 weeks  1. Patient will report pain level decrease to 2/10 to allow increased ease of movement. 2. Patient will have an improved score on the [facility] Angie outcome measure by 5 points to demonstrate an increase in functional activity tolerance. 3. Patient will be independent in final individualized HEP. 4. Patient will have an increase in left hamstring ROM to 70 degrees to allow performance of work tasks. 5. Patient will have an increase in hip abductor strength to 4+/5 to allow performance of work tasks. 6. Patient will return to walking without being limited by symptoms. 7. Patient will sleep 6-8 hours without being interrupted by pain. SUBJECTIVE:   \"The low back is okay today. It's more sore in my mid back. \"    Pain In: 5-6/10 mid back; 4/10 low back     OBJECTIVE DATA SUMMARY:   Objective data from initial evaluation:  Pain:  Location: Low back  Quality: throbbing  Now: 6/10  Best: 4/10  Worst: 10/10 when back \"locks up\"  Factors that improve pain: rest, medication: used and beneficial    Posture:   Rounded shoulders    Strength:      LEFT  RIGHT  Hip flexion  4/5  4/5  Hip abduction  4/5  4/5    Range of Motion:   Lumbar Spine (AROM)  (*Measured 3rd finger from the floor)  Flexion*  44 cm; pain; needs to use hands on thighs method to return to upright  Extension WNL; pain relief; however repeated extension increased pain  R side bend* 56 cm; tightness on right but not as bad as left   L side bend* 58 cm; tightness on left  R rotation WNL; stretch  L rotation WNL; stretch    Left hamstrin degrees  Right hamstrin degrees  Tightness in bilateral piriformis and hip adductors     Joint Mobility:   Unable to test lumbar spine joint mobility secondary to pain  Some relief with long axis joint distraction    Palpation:   Tender at bilateral thoracic and lumbar paraspinals, TFL/ITband, and gluteals    Neurologic Assessment:   Tone: Normal   Sensation: Intact   Reflexes: NT    Special Tests:   (-) Slump test    Mobility:   Transitional Movements: Increased time and effort to perform bed mobility and transfers   Gait: WNL    Balance:   WNL    Functional Measure:   [facility] Angie:      TREATMENT/INTERVENTION:  Modalities (Rationale): to decrease pain and muscle guarding  MHP to low back for 5 minutes while on LBE; no skin irritation noted    Therapeutic Exercises: to develop strength, endurance, range of motion, and flexibility  Initial HEP provided 19: Trunk flexion stretch; Trunk extension stretch; trunk rotation stretch; trunk SB stretch, seated piriformis stretch, hip adductor stretch; standing hip abduction and extension    Exercises in BOLD performed this date:    LBE: 3 minutes, level 1    Supine: *very limited tolerance to supine position  Hip adductor stretch: 30 sec holds B  LTR 5 reps  SKTC 5 reps B    Seated:   Trunk rotation stretch: 5 reps B  Trunk SB stretch: 5 reps B  Piriformis stretch: 2 reps with 30 sec holds B  Hamstring stretch on stool: 2 reps with 30 sec hold B  Forward flexion over blue physioball: 2 reps (then too uncomfortable in mid back to continue)  Flexion/ext over back of chair: 5 reps  Trunk flexion stretch: 2 reps with 20 sec hold  Lower cervical/upper thoracic stretch: 2 reps with 15 sec holds  Seated P-A pelvic tilt   5 reps    Standing:   Hip extension with 2#: 10 reps B  Hip abduction with 2#: 10 reps B  Marches with 2#: 10 reps B  Side steps with green TB: 10 feet x 2 each direction  Step ups (2 risers each side): 10 reps  Side step ups (2 risers each side): 10 reps   Trunk extension stretch: 3 reps     Manual Therapy: for joint mobilization/manipulations and soft tissue mobilization  None this date    Neuro Re-Education: to improve movement, balance, coordination, kinesthetic sense, posture, and proprioception for sitting or standing balance  None this date    Activity tolerance and post treatment pain report:   Fair  Pain Out: 4/10    Education:  Education was provided to the patient on the following topics: Importance of exercises . [x]    No changes were made to the home exercise program.  []    The following changes were made to the home exercise program:   Patient verbalized understanding of the topics presented. ASSESSMENT:   Patient presents with continued pain in mid to low back. Mid back is chief complaint this date. Patient continues to report occasional sharp spasm like pains which cause her to stop her current activity. She continues to report \"knots\" in thoracic and lumbar paraspinals which hurt after prolonged sitting and standing. Patient has not been able to perform exercises since Saturday due to colonoscopy prep. Improved exercise tolerance today compared to last session. Appeared to tolerate standing exercises better than sitting and supine positions. Increased rest breaks provided. 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 Discussed dry needling for mid to low back pain this date as possible intervention with patient wanting to try it. Patient understands need to transfer to [facility] PT clinic for dry needling with good understanding.     Patients progression toward goals is as follows:  [x]     Improving appropriately and progressing toward goals  []     Improving slowly and progressing toward goals  []     Not making progress toward goals and plan of care will be adjusted    PLAN OF CARE:   Patient continues to benefit from skilled intervention to address the above impairments. [x]    Continue treatment per established plan of care.   []     Recommend the following changes to the plan of care:     Recommendations/Intent for next treatment: transfer to [facility] PT for possible dry needling    Essie Alpers, PT   Time Calculation: 30 mins  Patient Time in clinic:   Start Time: 1330   Stop Time: 1400

## 2020-01-21 DIAGNOSIS — G89.29 CHRONIC MIDLINE LOW BACK PAIN WITHOUT SCIATICA: ICD-10-CM

## 2020-01-21 DIAGNOSIS — M54.50 CHRONIC MIDLINE LOW BACK PAIN WITHOUT SCIATICA: ICD-10-CM

## 2020-01-21 RX ORDER — IBUPROFEN 800 MG/1
800 TABLET ORAL
Qty: 60 TAB | Refills: 0 | Status: SHIPPED | OUTPATIENT
Start: 2020-01-21 | End: 2020-02-05

## 2020-01-25 DIAGNOSIS — M54.50 CHRONIC MIDLINE LOW BACK PAIN WITHOUT SCIATICA: ICD-10-CM

## 2020-01-25 DIAGNOSIS — G89.29 CHRONIC MIDLINE LOW BACK PAIN WITHOUT SCIATICA: ICD-10-CM

## 2020-01-26 RX ORDER — CYCLOBENZAPRINE HCL 10 MG
TABLET ORAL
Qty: 30 TAB | Refills: 0 | Status: SHIPPED | OUTPATIENT
Start: 2020-01-26 | End: 2020-02-27

## 2020-02-05 DIAGNOSIS — M54.50 CHRONIC MIDLINE LOW BACK PAIN WITHOUT SCIATICA: ICD-10-CM

## 2020-02-05 DIAGNOSIS — G89.29 CHRONIC MIDLINE LOW BACK PAIN WITHOUT SCIATICA: ICD-10-CM

## 2020-02-05 RX ORDER — IBUPROFEN 800 MG/1
800 TABLET ORAL
Qty: 60 TAB | Refills: 0 | Status: SHIPPED | OUTPATIENT
Start: 2020-02-05 | End: 2020-02-27

## 2020-02-12 ENCOUNTER — HOSPITAL ENCOUNTER (OUTPATIENT)
Dept: PHYSICAL THERAPY | Age: 60
Discharge: HOME | End: 2020-02-12
Payer: MEDICAID

## 2020-02-13 ENCOUNTER — OFFICE VISIT (OUTPATIENT)
Dept: INTERNAL MEDICINE CLINIC | Age: 60
End: 2020-02-13

## 2020-02-13 VITALS
HEIGHT: 68 IN | RESPIRATION RATE: 18 BRPM | TEMPERATURE: 99 F | WEIGHT: 189.1 LBS | HEART RATE: 81 BPM | SYSTOLIC BLOOD PRESSURE: 130 MMHG | OXYGEN SATURATION: 96 % | DIASTOLIC BLOOD PRESSURE: 83 MMHG | BODY MASS INDEX: 28.66 KG/M2

## 2020-02-13 DIAGNOSIS — M25.561 CHRONIC PAIN OF BOTH KNEES: ICD-10-CM

## 2020-02-13 DIAGNOSIS — M05.711 RHEUMATOID ARTHRITIS INVOLVING BOTH SHOULDERS WITH POSITIVE RHEUMATOID FACTOR (HCC): Primary | ICD-10-CM

## 2020-02-13 DIAGNOSIS — G89.29 CHRONIC PAIN OF BOTH KNEES: ICD-10-CM

## 2020-02-13 DIAGNOSIS — G89.29 CHRONIC BILATERAL LOW BACK PAIN WITHOUT SCIATICA: ICD-10-CM

## 2020-02-13 DIAGNOSIS — M25.562 CHRONIC PAIN OF BOTH KNEES: ICD-10-CM

## 2020-02-13 DIAGNOSIS — M54.50 CHRONIC BILATERAL LOW BACK PAIN WITHOUT SCIATICA: ICD-10-CM

## 2020-02-13 DIAGNOSIS — M05.712 RHEUMATOID ARTHRITIS INVOLVING BOTH SHOULDERS WITH POSITIVE RHEUMATOID FACTOR (HCC): Primary | ICD-10-CM

## 2020-02-13 RX ORDER — HYDROXYCHLOROQUINE SULFATE 200 MG/1
TABLET, FILM COATED ORAL
COMMUNITY
Start: 2020-01-27 | End: 2020-07-21

## 2020-02-13 RX ORDER — BISACODYL 5 MG
TABLET, DELAYED RELEASE (ENTERIC COATED) ORAL
COMMUNITY
Start: 2020-01-02 | End: 2020-07-21 | Stop reason: ALTCHOICE

## 2020-02-13 NOTE — PROGRESS NOTES
Subjective: (As above and below)     Chief Complaint   Patient presents with    Follow-up     From Rhuem and colonoscopy     Jade Huizar is a 61y.o. year old female who presents for follow up on joint pain    She has seen rheumatology at [facility] for +RA and sarcoid - she was given prednisone which relieved symptoms. She was restarted on plaquenil which she only took x 4 days - she is not happy w/ the amount of medications she is taking. She continues to have pain - to shoulder, hip, knee joints. She also has known OA. She was participating in PT which was helping some, PT recc dry needling which she is willing to try now    She is followed by pulm at [facility] for sarcoid as well. Is utd on vision    She had colonoscopy done - repeat 3 years      Reviewed PmHx, RxHx, FmHx, SocHx, AllgHx and updated in chart. Family History   Problem Relation Age of Onset    Hypertension Mother     Diabetes Mother     Stroke Mother          ~ 65 yo    Heart Disease Father          ~ 78 yo    Breast Cancer Cousin         mat. 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 No pets in the house.            Current Outpatient Medications Medication Sig    hydroxychloroquine (PLAQUENIL) 200 mg tablet TAKE 1 TAB MG DAILY FOR 1 WEEK, THAN 2 TABS DAILY TO CONTINUE WITH FOOD OR MILK    ibuprofen (MOTRIN) 800 mg tablet TAKE 1 TAB BY MOUTH EVERY EIGHT (8) HOURS AS NEEDED FOR PAIN.  cyclobenzaprine (FLEXERIL) 10 mg tablet TAKE 1 TABLET BY MOUTH NIGHTLY AS NEEDED FOR MUSCLE SPASM(S).  montelukast (SINGULAIR) 10 mg tablet TAKE 1 TABLET BY MOUTH EVERY DAY    triamcinolone acetonide (KENALOG) 0.1 % ointment Apply  to affected area two (2) times a day. use thin layer    amLODIPine (NORVASC) 10 mg tablet TAKE 1 TAB BY MOUTH DAILY. For blood pressure    buPROPion XL (WELLBUTRIN XL) 150 mg tablet Take 1 Tab by mouth every morning. depression    FLOVENT  mcg/actuation inhaler TAKE 2 PUFFS BY MOUTH TWICE DAILY    fluticasone propionate (FLONASE) 50 mcg/actuation nasal spray USE 2 SPRAYS IN EACH NOSTRIL TWICE A DAY    dextroamphetamine-amphetamine (ADDERALL) 20 mg tablet Take 1 Tab by mouth two (2) times a day. Max Daily Amount: 40 mg.    albuterol (PROVENTIL HFA, VENTOLIN HFA, PROAIR HFA) 90 mcg/actuation inhaler Take 1 Puff by inhalation every six (6) hours as needed for Wheezing.  ascorbic acid, vitamin C, (VITAMIN C) 500 mg tablet Take  by mouth.  cyanocobalamin 1,000 mcg tablet Take 1,000 mcg by mouth daily.  clobetasol (TEMOVATE) 0.05 % topical cream Apply  to affected area two (2) times daily as needed for Skin Irritation. eczema    Cetirizine (ZYRTEC) 10 mg cap Take 10 mg by mouth nightly.  FLAXSEED OIL (OMEGA 3 PO) Take  by mouth.  cholecalciferol, VITAMIN D3, (VITAMIN D3) 5,000 unit tab tablet Take  by mouth daily.  FOLIC ACID/MULTIVIT-MIN/LUTEIN (CENTRUM SILVER PO) Take  by mouth.  bisacodyL (DULCOLAX) 5 mg EC tablet      No current facility-administered medications for this visit. Review of Systems:   Constitutional:    Negative for fever and chills, negative diaphoresis.    HEENT:              Negative for neck pain and stiffness. Eyes:                  Negative for visual disturbance, itching, redness or discharge. Respiratory:        Negative for cough and shortness of breath. Cardiovascular:  Negative for chest pain and palpitations. Gastrointestinal: Negative for nausea, vomiting, abdominal pain, diarrhea or constipation. Genitourinary:     Negative for dysuria and frequency. Musculoskeletal: Negative for falls, tenderness and swelling. Skin:                    Negative for rash, masses or lesions. Neurological:       Negative for dizzyness, seizure, loss of consciousness, weakness and numbness. Objective:     Vitals:    02/13/20 1128   BP: 130/83   Pulse: 81   Resp: 18   Temp: 99 °F (37.2 °C)   TempSrc: Oral   SpO2: 96%   Weight: 189 lb 1.6 oz (85.8 kg)   Height: 5' 8\" (1.727 m)       Results for orders placed or performed in visit on 41/17/91   METABOLIC PANEL, BASIC   Result Value Ref Range    Glucose 98 65 - 99 mg/dL    BUN 14 6 - 24 mg/dL    Creatinine 1.01 (H) 0.57 - 1.00 mg/dL    GFR est non-AA 61 >59 mL/min/1.73    GFR est AA 70 >59 mL/min/1.73    BUN/Creatinine ratio 14 9 - 23    Sodium 140 134 - 144 mmol/L    Potassium 4.4 3.5 - 5.2 mmol/L    Chloride 102 96 - 106 mmol/L    CO2 23 20 - 29 mmol/L    Calcium 9.8 8.7 - 10.2 mg/dL   URIC ACID   Result Value Ref Range    Uric acid 5.5 2.5 - 7.1 mg/dL         Physical Examination: General appearance - alert, well appearing, and in no distress and overweight  Chest - clear to auscultation, no wheezes, rales or rhonchi, symmetric air entry  Heart - normal rate, regular rhythm, normal S1, S2, no murmurs, rubs, clicks or gallops  Extremities - no pedal edema noted      Assessment/ Plan:      Cont care w/ rheum/pulm    1. Rheumatoid arthritis involving both shoulders with positive rheumatoid factor (HCC)    - REFERRAL TO PHYSICAL THERAPY    2.  Chronic pain of both knees    - REFERRAL TO PHYSICAL THERAPY    3. Chronic bilateral low back pain without sciatica    - REFERRAL TO PHYSICAL THERAPY      I have discussed the diagnosis with the patient and the intended plan as seen in the above orders. The patient has received an after-visit summary and questions were answered concerning future plans. Pt conveyed understanding of plan. Medication Side Effects and Warnings were discussed with patient: yes  Patient Labs were reviewed: yes  Patient Past Records were reviewed:  yes    Varghese Meyers.  Mauri Murillo NP

## 2020-02-13 NOTE — PROGRESS NOTES
Pt here for   Chief Complaint   Patient presents with    Follow-up     From ue and colonoscopy     1. Have you been to the ER, urgent care clinic since your last visit? Hospitalized since your last visit? No    2. Have you seen or consulted any other health care providers outside of the [address] [place] [address] since your last visit? Include any pap smears or colon screening.  No       Pt c/o pain 7 of 10, Pt denies taking anything for pain today    3 most recent PHQ Screens 2/13/2020   PHQ Not Done Medical Reason (indicate in comments)   Little interest or pleasure in doing things -   Feeling down, depressed, irritable, or hopeless -   Total Score PHQ 2 -

## 2020-02-19 DIAGNOSIS — I10 ESSENTIAL HYPERTENSION WITH GOAL BLOOD PRESSURE LESS THAN 140/90: ICD-10-CM

## 2020-02-19 DIAGNOSIS — G89.29 CHRONIC MIDLINE LOW BACK PAIN WITHOUT SCIATICA: Primary | ICD-10-CM

## 2020-02-19 DIAGNOSIS — M54.50 CHRONIC MIDLINE LOW BACK PAIN WITHOUT SCIATICA: Primary | ICD-10-CM

## 2020-02-19 RX ORDER — AMLODIPINE BESYLATE 10 MG/1
TABLET ORAL
Qty: 30 TAB | Refills: 5 | Status: SHIPPED | OUTPATIENT
Start: 2020-02-19 | End: 2020-08-17

## 2020-02-27 ENCOUNTER — HOSPITAL ENCOUNTER (OUTPATIENT)
Dept: PHYSICAL THERAPY | Age: 60
Discharge: HOME | End: 2020-02-27
Payer: MEDICAID

## 2020-02-27 DIAGNOSIS — M54.50 CHRONIC MIDLINE LOW BACK PAIN WITHOUT SCIATICA: ICD-10-CM

## 2020-02-27 DIAGNOSIS — G89.29 CHRONIC MIDLINE LOW BACK PAIN WITHOUT SCIATICA: ICD-10-CM

## 2020-02-27 PROCEDURE — 97110 THERAPEUTIC EXERCISES: CPT | Performed by: PHYSICAL THERAPIST

## 2020-02-27 RX ORDER — CYCLOBENZAPRINE HCL 10 MG
TABLET ORAL
Qty: 30 TAB | Refills: 0 | Status: SHIPPED | OUTPATIENT
Start: 2020-02-27 | End: 2020-04-06

## 2020-02-27 RX ORDER — IBUPROFEN 800 MG/1
800 TABLET ORAL
Qty: 60 TAB | Refills: 0 | Status: SHIPPED | OUTPATIENT
Start: 2020-02-27 | End: 2020-03-16

## 2020-02-27 NOTE — PROGRESS NOTES
[address]    OUTPATIENT PHYSICAL THERAPY DAILY TREATMENT NOTE  VISIT: 5    NAME: Riaz Butterfield AGE: 61 y.o. GENDER: female  DATE: 2/27/2020  REFERRING PHYSICIAN: Kael Downing., *      GOALS  Short term goals  Time frame: 4 weeks  1. Patient will be compliant and independent with the initial HEP as evidenced by being able to perform without cuing. 2. Patient will report a 25% improvement in symptoms. 3. Patient report a 25% improvement in sleeping. 4. Patient will have an increased tolerance for standing to allow 90 minutes of the activity before symptoms start. 5. Patient will tolerate 10 minutes of clinic activities before increase of symptoms. Long term goals  Time frame: 8 weeks  1. Patient will report pain level decrease to 2/10 to allow increased ease of movement. 2. Patient will have an improved score on the [facility] Angie outcome measure by 5 points to demonstrate an increase in functional activity tolerance. 3. Patient will be independent in final individualized HEP. 4. Patient will have an increase in left hamstring ROM to 70 degrees to allow performance of work tasks. 5. Patient will have an increase in hip abductor strength to 4+/5 to allow performance of work tasks. 6. Patient will return to walking without being limited by symptoms. 7. Patient will sleep 6-8 hours without being interrupted by pain. SUBJECTIVE:   \"I started a new medication which has helped some. \"    Pain In: 4-5/10 mid to low back     OBJECTIVE DATA SUMMARY:   Objective data from initial evaluation:  Pain:  Location: Low back  Quality: throbbing  Now: 6/10  Best: 4/10  Worst: 10/10 when back \"locks up\"  Factors that improve pain: rest, medication: used and beneficial    Posture:   Rounded shoulders    Strength:      LEFT  RIGHT  Hip flexion  4/5  4/5  Hip abduction  4/5  4/5    Range of Motion:   Lumbar Spine (AROM)  (*Measured 3rd finger from the floor)  Flexion*  44 cm; pain; needs to use hands on thighs method to return to upright  Extension WNL; pain relief; however repeated extension increased pain  R side bend* 56 cm; tightness on right but not as bad as left   L side bend* 58 cm; tightness on left  R rotation WNL; stretch  L rotation WNL; stretch    Left hamstrin degrees  Right hamstrin degrees  Tightness in bilateral piriformis and hip adductors     Joint Mobility:   Unable to test lumbar spine joint mobility secondary to pain  Some relief with long axis joint distraction    Palpation:   Tender at bilateral thoracic and lumbar paraspinals, TFL/ITband, and gluteals    Neurologic Assessment:   Tone: Normal   Sensation: Intact   Reflexes: NT    Special Tests:   (-) Slump test    Mobility:   Transitional Movements: Increased time and effort to perform bed mobility and transfers   Gait: WNL    Balance:   WNL    Functional Measure:   Cheryl Chacko:      TREATMENT/INTERVENTION:  Modalities (Rationale): to decrease pain and muscle guarding  MHP to low back for 5 minutes while on LBE; no skin irritation noted    Therapeutic Exercises: to develop strength, endurance, range of motion, and flexibility  Initial HEP provided 19: Trunk flexion stretch; Trunk extension stretch; trunk rotation stretch; trunk SB stretch, seated piriformis stretch, hip adductor stretch; standing hip abduction and extension    Exercises in BOLD performed this date:    LBE: 5 minutes, level 3    Supine: *very limited tolerance to supine position  Hip adductor stretch: 30 sec holds B  LTR: 5 reps  Bridges: 5 reps  Pelvic tilts: 3 reps; unable to tolerate more reps  SKTC 5 reps B     Seated:   Trunk rotation stretch: 5 reps B  Trunk SB stretch with arm reaching overhead: 5 reps B  Piriformis stretch: 2 reps with 30 sec holds B  Hamstring stretch on stool: 2 reps with 30 sec hold B  Forward flexion over blue physioball: 5 reps with 10 sec hold  Flexion/ext over back of chair: 5 reps  Trunk flexion stretch: 2 reps with 20 sec hold  Lower cervical/upper thoracic stretch: 2 reps with 15 sec holds  Seated P-A pelvic tilt   5 reps    Standing:   Hip extension with 2#: 10 reps B  Hip abduction with 2#: 10 reps B  Marches with 2#: 10 reps B  Side steps with green TB: 10 feet x 2 each direction  Step ups (2 risers each side): 10 reps  Side step ups (2 risers each side): 10 reps   Trunk extension stretch: 3 reps     Manual Therapy: for joint mobilization/manipulations and soft tissue mobilization  None this date    Activity tolerance and post treatment pain report:   Fair  Pain Out: 4/10    Education:  Education was provided to the patient on the following topics: Importance of exercises . [x]    No changes were made to the home exercise program.  []    The following changes were made to the home exercise program:   Patient verbalized understanding of the topics presented. ASSESSMENT:   Patient returns for the first time since 1/9/2020 as she was unable to schedule dry needling PT appointment due to the intervention not being covered by her insurance. Patient presents with continued pain in mid to low back at 4-5/10. Pain does radiate to her upper back and bilateral hips as well. She recently started a new medication which has helped with pain. Patient continues to report occasional sharp spasm like pains which cause her to stop her current activity. Increased pain after prolonged sitting and standing. Patient has not really been performing her prescribed exercises since last seen. Fair tolerance to clinic exercises this date. Increased rest breaks provided. 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 Patient also with complaints of bilateral shoulder pain. Extending plan of care due to missed time and continued benefit from therapy.     Patients progression toward goals is as follows:   []     Improving appropriately and progressing toward goals  [x]     Improving slowly and progressing toward goals  []     Not making progress toward goals and plan of care will be adjusted    PLAN OF CARE:   Patient continues to benefit from skilled intervention to address the above impairments. [x]    Continue treatment per established plan of care.   []     Recommend the following changes to the plan of care:     Recommendations/Intent for next treatment: progress as able    Laron Okeefe, PT   Time Calculation: 30 mins  Patient Time in clinic:   Start Time: 1330   Stop Time: 1400      TREATMENT PLAN EFFECTIVE DATES:   2/21/2019 TO 4/24/2020  I have read the above plan of care for [facility] and certify the need for skilled physical therapy services.     Physician Signature: ____________________________________________________     Date: _________________________________________________________________

## 2020-03-05 ENCOUNTER — HOSPITAL ENCOUNTER (OUTPATIENT)
Dept: PHYSICAL THERAPY | Age: 60
Discharge: HOME | End: 2020-03-05
Payer: MEDICAID

## 2020-03-05 PROCEDURE — 97110 THERAPEUTIC EXERCISES: CPT | Performed by: PHYSICAL THERAPIST

## 2020-03-05 NOTE — PROGRESS NOTES
[address]    OUTPATIENT PHYSICAL THERAPY DAILY TREATMENT NOTE  VISIT: 6    NAME: Júnior Delarosa AGE: 61 y.o. GENDER: female  DATE: 3/5/2020  REFERRING PHYSICIAN: Saba Field., *      GOALS  Short term goals  Time frame: 4 weeks  1. Patient will be compliant and independent with the initial HEP as evidenced by being able to perform without cuing. 2. Patient will report a 25% improvement in symptoms. 3. Patient report a 25% improvement in sleeping. 4. Patient will have an increased tolerance for standing to allow 90 minutes of the activity before symptoms start. 5. Patient will tolerate 10 minutes of clinic activities before increase of symptoms. Long term goals  Time frame: 8 weeks  1. Patient will report pain level decrease to 2/10 to allow increased ease of movement. 2. Patient will have an improved score on the [facility] Angie outcome measure by 5 points to demonstrate an increase in functional activity tolerance. 3. Patient will be independent in final individualized HEP. 4. Patient will have an increase in left hamstring ROM to 70 degrees to allow performance of work tasks. 5. Patient will have an increase in hip abductor strength to 4+/5 to allow performance of work tasks. 6. Patient will return to walking without being limited by symptoms. 7. Patient will sleep 6-8 hours without being interrupted by pain. SUBJECTIVE:   \"It's not too bad today. The medication has been helping. \"    Pain In: 4/10 mid to low back     OBJECTIVE DATA SUMMARY:   Objective data from initial evaluation:  Pain:  Location: Low back  Quality: throbbing  Now: 6/10  Best: 4/10  Worst: 10/10 when back \"locks up\"  Factors that improve pain: rest, medication: used and beneficial    Posture:   Rounded shoulders    Strength:      LEFT  RIGHT  Hip flexion  4/5  4/5  Hip abduction  4/5  4/5    Range of Motion:   Lumbar Spine (AROM)  (*Measured 3rd finger from the floor)  Flexion*  44 cm; pain; needs to use hands on thighs method to return to upright  Extension WNL; pain relief; however repeated extension increased pain  R side bend* 56 cm; tightness on right but not as bad as left   L side bend* 58 cm; tightness on left  R rotation WNL; stretch  L rotation WNL; stretch    Left hamstrin degrees  Right hamstrin degrees  Tightness in bilateral piriformis and hip adductors     Joint Mobility:   Unable to test lumbar spine joint mobility secondary to pain  Some relief with long axis joint distraction    Palpation:   Tender at bilateral thoracic and lumbar paraspinals, TFL/ITband, and gluteals    Neurologic Assessment:   Tone: Normal   Sensation: Intact   Reflexes: NT    Special Tests:   (-) Slump test    Mobility:   Transitional Movements: Increased time and effort to perform bed mobility and transfers   Gait: WNL    Balance:   WNL    Functional Measure:   Jenni Springon:      TREATMENT/INTERVENTION:  Modalities (Rationale): to decrease pain and muscle guarding  MHP to mid to low back for 13 minutes with electrical stimulation to bilateral thoracic and lumbar paraspinals in sitting; no skin irritation noted; pain relief    Therapeutic Exercises: to develop strength, endurance, range of motion, and flexibility  Initial HEP provided 19: Trunk flexion stretch; Trunk extension stretch; trunk rotation stretch; trunk SB stretch, seated piriformis stretch, hip adductor stretch; standing hip abduction and extension    Exercises in BOLD performed this date:    LBE: 5 minutes, level 2 (seat at 10)    Supine: *very limited tolerance to supine position  Hip adductor stretch: 30 sec holds B  LTR: 5 reps  Bridges: 5 reps  Pelvic tilts: 3 reps; unable to tolerate more reps  SKTC 5 reps B     Seated:   Trunk rotation stretch: 5 reps B  Trunk SB stretch with arm reaching overhead: 5 reps B  Piriformis stretch: 2 reps with 30 sec holds B  Hamstring stretch on stool: 2 reps with 30 sec hold B  Forward flexion over blue physioball: 5 reps with 10 sec hold  Flexion/ext over back of chair: 5 reps  Trunk flexion stretch: 2 reps with 20 sec hold  Lower cervical/upper thoracic stretch: 2 reps with 15 sec holds  Seated P-A pelvic tilt   5 reps    Standing:   Hip extension with 4#: 3 sets of 5 reps B  Hip abduction with 4#: 3 sets of 5 reps B  Marches with 2#: 10 reps B  Side steps with green TB: 10 feet x 2 each direction  Step ups (2 risers each side): 10 reps  Side step ups (2 risers each side): 10 reps   Trunk extension stretch: 3 reps    Sidelying lumbar rotation stretch: 30 sec holds (left)     Manual Therapy: for joint mobilization/manipulations and soft tissue mobilization  None this date    Activity tolerance and post treatment pain report:   Fair  Pain Out: 4/10    Education:  Education was provided to the patient on the following topics: Importance of exercises . [x]    No changes were made to the home exercise program.  []    The following changes were made to the home exercise program:   Patient verbalized understanding of the topics presented. ASSESSMENT:   Patient presents with slight decrease in mid to low back pain. She reports that she has been regularly taking her medication which has helped. Pain does radiate to her upper back, bilateral hips, and bilateral shoulders. Patient continues to report occasional sharp spasm like pains which cause her to stop her current activity. Increased pain after prolonged sitting and standing. Fair tolerance to clinic exercises this date. Increased rest breaks provided. 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 Trial of electrical stimulation to bilateral thoracic and lumbar paraspinals with pain relief noted.      Patients progression toward goals is as follows:   []     Improving appropriately and progressing toward goals  [x]     Improving slowly and progressing toward goals  []     Not making progress toward goals and plan of care will be adjusted    PLAN OF CARE:   Patient continues to benefit from skilled intervention to address the above impairments. [x]    Continue treatment per established plan of care.   []     Recommend the following changes to the plan of care:     Recommendations/Intent for next treatment: reassess response to electrical stimulation    Laron Okeefe, PT   Time Calculation: 53 mins  Patient Time in clinic:   Start Time: 1624   Stop Time: [id number]

## 2020-03-12 ENCOUNTER — HOSPITAL ENCOUNTER (OUTPATIENT)
Dept: PHYSICAL THERAPY | Age: 60
Discharge: HOME | End: 2020-03-12
Payer: MEDICAID

## 2020-03-12 PROCEDURE — 97110 THERAPEUTIC EXERCISES: CPT | Performed by: PHYSICAL THERAPIST

## 2020-03-12 NOTE — PROGRESS NOTES
[address]    OUTPATIENT PHYSICAL THERAPY DAILY TREATMENT NOTE  VISIT: 7    NAME: Jenni Osborne AGE: 61 y.o. GENDER: female  DATE: 3/12/2020  REFERRING PHYSICIAN: Ed Estevez., *      GOALS  Short term goals  Time frame: 4 weeks  1. Patient will be compliant and independent with the initial HEP as evidenced by being able to perform without cuing. 2. Patient will report a 25% improvement in symptoms. 3. Patient report a 25% improvement in sleeping. 4. Patient will have an increased tolerance for standing to allow 90 minutes of the activity before symptoms start. 5. Patient will tolerate 10 minutes of clinic activities before increase of symptoms. Long term goals  Time frame: 8 weeks  1. Patient will report pain level decrease to 2/10 to allow increased ease of movement. 2. Patient will have an improved score on the [facility] Angie outcome measure by 5 points to demonstrate an increase in functional activity tolerance. 3. Patient will be independent in final individualized HEP. 4. Patient will have an increase in left hamstring ROM to 70 degrees to allow performance of work tasks. 5. Patient will have an increase in hip abductor strength to 4+/5 to allow performance of work tasks. 6. Patient will return to walking without being limited by symptoms. 7. Patient will sleep 6-8 hours without being interrupted by pain. SUBJECTIVE:   \"It's not hurting in my back today. It's been like that for the last 2 days. It's more my heels and left knee and both shoulders today. \"    Pain In: 0/10 mid to low back     OBJECTIVE DATA SUMMARY:   Objective data from initial evaluation:  Pain:  Location: Low back  Quality: throbbing  Now: 6/10  Best: 4/10  Worst: 10/10 when back \"locks up\"  Factors that improve pain: rest, medication: used and beneficial    Posture:   Rounded shoulders    Strength:      LEFT  RIGHT  Hip flexion  4/5  4/5  Hip abduction  4/5  4/5    Range of Motion:   Lumbar Spine (AROM)  (*Measured 3rd finger from the floor)  Flexion*  44 cm; pain; needs to use hands on thighs method to return to upright  Extension WNL; pain relief; however repeated extension increased pain  R side bend* 56 cm; tightness on right but not as bad as left   L side bend* 58 cm; tightness on left  R rotation WNL; stretch  L rotation WNL; stretch    Left hamstrin degrees  Right hamstrin degrees  Tightness in bilateral piriformis and hip adductors     Joint Mobility:   Unable to test lumbar spine joint mobility secondary to pain  Some relief with long axis joint distraction    Palpation:   Tender at bilateral thoracic and lumbar paraspinals, TFL/ITband, and gluteals    Neurologic Assessment:   Tone: Normal   Sensation: Intact   Reflexes: NT    Special Tests:   (-) Slump test    Mobility:   Transitional Movements: Increased time and effort to perform bed mobility and transfers   Gait: WNL    Balance:   WNL    Functional Measure:   Nury Milan:      TREATMENT/INTERVENTION:  Modalities (Rationale): to decrease pain and muscle guarding  MHP to mid to low back for 8 minutes in sitting; no skin irritation noted; pain relief  Electrical stimulation to bilateral thoracic and lumbar paraspinals -held this date    Therapeutic Exercises: to develop strength, endurance, range of motion, and flexibility  Initial HEP provided 19: Trunk flexion stretch; Trunk extension stretch; trunk rotation stretch; trunk SB stretch, seated piriformis stretch, hip adductor stretch; standing hip abduction and extension    Exercises in BOLD performed this date:    LBE: 5 minutes, level 2 (seat at 10)    Supine: *very limited tolerance to supine position  Hip adductor stretch: 30 sec holds B  LTR: 5 reps  Bridges: 5 reps  Pelvic tilts: 3 reps; unable to tolerate more reps  SKTC: 5 reps B     Semi supine:   Hamstring stretch (knees slightly bent): 2 reps with 20 sec holds  Gastroc stretch with towel: 2 reps with 30 sec holds B    Seated:   Trunk rotation stretch: 5 reps B  Trunk SB stretch with arm reaching overhead: 5 reps B  Trunk flexion stretch: 2 reps with 20 sec holds   Piriformis stretch: 2 reps with 30 sec holds B  Hamstring stretch on stool: 2 reps with 30 sec hold B  Forward flexion over blue physioball: 5 reps with 10 sec hold  Flexion/ext over back of chair: 5 reps  Trunk flexion stretch: 2 reps with 20 sec hold  Lower cervical/upper thoracic stretch: 2 reps with 15 sec holds  Seated P-A pelvic tilt   5 reps    Standing:   Hip extension with 3#: 10 reps B  Hip abduction with 3#: 10 reps B  Shoulder flexion stretch against wall: 3 reps with 10 sec holds B  Marches with 2#: 10 reps B  Side steps with green TB: 10 feet x 2 each direction  Step ups (2 risers each side): 10 reps  Side step ups (2 risers each side): 10 reps   Trunk extension stretch: 3 reps    Sidelying lumbar rotation stretch: 30 sec holds (left)     Manual Therapy: for joint mobilization/manipulations and soft tissue mobilization  None this date    Activity tolerance and post treatment pain report:   Fair  Pain Out: 0/10    Education:  Education was provided to the patient on the following topics: Importance of exercises; performing in pain free ROM. []    No changes were made to the home exercise program.  [x]    The following changes were made to the home exercise program: gastroc stretch with towel  Patient verbalized understanding of the topics presented. ASSESSMENT:   Patient presents with no pain in mid to low back for the last 2 days. She reports that she has been regularly taking her medication which has helped. She reports regular performance of HEP. She does report that the stretches help when she feels like her back is \"knotting up. \" Patient with complaints of chronic left knee, bilateral shoulder, and bilateral heel/foot pain today.  Patient with improved tolerance to clinic exercises. Patient instructed to try gastroc stretch with towel in semi supine at home. Increased rest breaks provided. 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 Good response to electrical stimulation last session, but held this date secondary to no back pain. Patients progression toward goals is as follows:   []     Improving appropriately and progressing toward goals  [x]     Improving slowly and progressing toward goals  []     Not making progress toward goals and plan of care will be adjusted    PLAN OF CARE:   Patient continues to benefit from skilled intervention to address the above impairments. [x]    Continue treatment per established plan of care.   []     Recommend the following changes to the plan of care:     Recommendations/Intent for next treatment: progress as able     Ermalene Moritz, PT   Time Calculation: 40 mins  Patient Time in clinic:   Start Time: 1000   Stop Time: 0644

## 2020-03-16 ENCOUNTER — HOSPITAL ENCOUNTER (OUTPATIENT)
Dept: PHYSICAL THERAPY | Age: 60
Discharge: HOME | End: 2020-03-16
Payer: MEDICAID

## 2020-03-16 DIAGNOSIS — M54.50 CHRONIC MIDLINE LOW BACK PAIN WITHOUT SCIATICA: ICD-10-CM

## 2020-03-16 DIAGNOSIS — G89.29 CHRONIC MIDLINE LOW BACK PAIN WITHOUT SCIATICA: ICD-10-CM

## 2020-03-16 PROCEDURE — 97110 THERAPEUTIC EXERCISES: CPT | Performed by: PHYSICAL THERAPIST

## 2020-03-16 RX ORDER — IBUPROFEN 800 MG/1
800 TABLET ORAL
Qty: 60 TAB | Refills: 0 | Status: SHIPPED | OUTPATIENT
Start: 2020-03-16 | End: 2020-04-06

## 2020-03-16 NOTE — PROGRESS NOTES
[address]    OUTPATIENT PHYSICAL THERAPY DAILY TREATMENT NOTE  VISIT: 8    NAME: Preston Jain AGE: 61 y.o. GENDER: female  DATE: 3/16/2020  REFERRING PHYSICIAN: Jonathan Patton., *      GOALS  Short term goals  Time frame: 4 weeks  1. Patient will be compliant and independent with the initial HEP as evidenced by being able to perform without cuing. 2. Patient will report a 25% improvement in symptoms. 3. Patient report a 25% improvement in sleeping. 4. Patient will have an increased tolerance for standing to allow 90 minutes of the activity before symptoms start. 5. Patient will tolerate 10 minutes of clinic activities before increase of symptoms. Long term goals  Time frame: 8 weeks  1. Patient will report pain level decrease to 2/10 to allow increased ease of movement. 2. Patient will have an improved score on the [employer] Angie outcome measure by 5 points to demonstrate an increase in functional activity tolerance. 3. Patient will be independent in final individualized HEP. 4. Patient will have an increase in left hamstring ROM to 70 degrees to allow performance of work tasks. 5. Patient will have an increase in hip abductor strength to 4+/5 to allow performance of work tasks. 6. Patient will return to walking without being limited by symptoms. 7. Patient will sleep 6-8 hours without being interrupted by pain. SUBJECTIVE:   \"My back was spasming a lot yesterday. \"    Pain In: 6/10 mid to low back     OBJECTIVE DATA SUMMARY:   Objective data from initial evaluation:  Pain:  Location: Low back  Quality: throbbing  Now: 6/10  Best: 4/10  Worst: 10/10 when back \"locks up\"  Factors that improve pain: rest, medication: used and beneficial    Posture:   Rounded shoulders    Strength:      LEFT  RIGHT  Hip flexion  4/5  4/5  Hip abduction  4/5  4/5    Range of Motion:   Lumbar Spine (AROM)  (*Measured 3rd finger from the floor)  Flexion*  44 cm; pain; needs to use hands on thighs method to return to upright  Extension WNL; pain relief; however repeated extension increased pain  R side bend* 56 cm; tightness on right but not as bad as left   L side bend* 58 cm; tightness on left  R rotation WNL; stretch  L rotation WNL; stretch    Left hamstrin degrees  Right hamstrin degrees  Tightness in bilateral piriformis and hip adductors     Joint Mobility:   Unable to test lumbar spine joint mobility secondary to pain  Some relief with long axis joint distraction    Palpation:   Tender at bilateral thoracic and lumbar paraspinals, TFL/ITband, and gluteals    Neurologic Assessment:   Tone: Normal   Sensation: Intact   Reflexes: NT    Special Tests:   (-) Slump test    Mobility:   Transitional Movements: Increased time and effort to perform bed mobility and transfers   Gait: WNL    Balance:   WNL    Functional Measure:   [place]ia [place]:      TREATMENT/INTERVENTION:  Modalities (Rationale): to decrease pain and muscle guarding  MHP to mid to low back for 8 minutes in sitting; no skin irritation noted; pain relief  Electrical stimulation to bilateral thoracic and lumbar paraspinals with MHP; no skin irritation noted     Therapeutic Exercises: to develop strength, endurance, range of motion, and flexibility  Initial HEP provided 19: Trunk flexion stretch; Trunk extension stretch; trunk rotation stretch; trunk SB stretch, seated piriformis stretch, hip adductor stretch; standing hip abduction and extension    Exercises in BOLD performed this date:    LBE: 5 minutes, level 2 (seat at 10)    Supine: *very limited tolerance to supine position  Hip adductor stretch: 30 sec holds B  LTR: 5 reps  Bridges: 5 reps  Pelvic tilts: 3 reps; unable to tolerate more reps  SKTC: 5 reps B     Semi supine:   Hamstring stretch (knees slightly bent): 2 reps with 20 sec holds  Gastroc stretch with towel: 2 reps with 30 sec holds B    Seated: Trunk rotation stretch: 5 reps B  Trunk SB stretch with arm reaching overhead: 5 reps B  Trunk flexion stretch: 2 reps with 20 sec holds   Piriformis stretch: 2 reps with 30 sec holds B  Hamstring stretch on stool: 2 reps with 30 sec hold B  Forward flexion over blue physioball: 5 reps with 10 sec hold  Flexion/ext over back of chair: 5 reps  Trunk flexion stretch: 2 reps with 20 sec hold  Lower cervical/upper thoracic stretch: 2 reps with 15 sec holds  Seated P-A pelvic tilt   5 reps    Standing:   Hip extension with 3#: 10 reps B  Hip abduction with 3#: 10 reps B  Gastroc stretch: 30 sec holds B  Shoulder flexion stretch against wall: 3 reps with 10 sec holds B  Marches with 2#: 10 reps B  Side steps with green TB: 10 feet x 2 each direction  Step ups (2 risers each side): 10 reps  Side step ups (2 risers each side): 10 reps   Trunk extension stretch: 3 reps    Sidelying lumbar rotation stretch: 30 sec holds (left)     Manual Therapy: for joint mobilization/manipulations and soft tissue mobilization  None this date    Activity tolerance and post treatment pain report:   Fair  Pain Out: 3/10    Education:  Education was provided to the patient on the following topics: Importance of exercises; performing in pain free ROM. [x]    No changes were made to the home exercise program.  []    The following changes were made to the home exercise program  Patient verbalized understanding of the topics presented. ASSESSMENT:   Patient presents with 6/10 pain in mid to low back this date. She feels like her back may \"lock up\" on her soon. She reports that she has been regularly taking her medication which has helped some. She reports regular performance of HEP. Patient with continued complaints of chronic left knee (4/10), bilateral hips (6/10), bilateral shoulder (5/10), and bilateral heel/foot pain (5/10). She believes majority of these issues stem from her back.  She reports that her hips usually bother her when her back pain is increased. Patient with fair tolerance of clinic exercises today. Rest breaks provided. 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 Good response to electrical stimulation. Patients progression toward goals is as follows:   []     Improving appropriately and progressing toward goals  [x]     Improving slowly and progressing toward goals  []     Not making progress toward goals and plan of care will be adjusted    PLAN OF CARE:   Patient continues to benefit from skilled intervention to address the above impairments. [x]    Continue treatment per established plan of care.   []     Recommend the following changes to the plan of care:     Recommendations/Intent for next treatment: progress as able     Antonio Tate, PT   Time Calculation: 40 mins  Patient Time in clinic:   Start Time: 1325   Stop Time: [phone]

## 2020-03-19 DIAGNOSIS — J45.20 MILD INTERMITTENT ASTHMA WITHOUT COMPLICATION: ICD-10-CM

## 2020-03-19 DIAGNOSIS — J30.2 SEASONAL ALLERGIC RHINITIS: ICD-10-CM

## 2020-03-19 DIAGNOSIS — L30.9 ECZEMA, UNSPECIFIED TYPE: ICD-10-CM

## 2020-03-19 RX ORDER — ALBUTEROL SULFATE 90 UG/1
1 AEROSOL, METERED RESPIRATORY (INHALATION)
Qty: 1 INHALER | Refills: 3 | Status: SHIPPED | OUTPATIENT
Start: 2020-03-19

## 2020-03-19 RX ORDER — CETIRIZINE HYDROCHLORIDE 10 MG/1
10 CAPSULE, LIQUID FILLED ORAL
Qty: 90 CAP | Refills: 3 | Status: SHIPPED | OUTPATIENT
Start: 2020-03-19 | End: 2021-08-10

## 2020-03-19 NOTE — TELEPHONE ENCOUNTER
Patient called and stated that she needs a refill on her prescription. It is to be refilled at the pharmacy on file. She is asking for a 90 day supply on the Zyrtec prescription.

## 2020-03-23 NOTE — PROGRESS NOTES
In-person therapy visits for this out-patient have been placed on temporary hold until on or after 5/26/20 in compliance with organizational directives and CDC recommendations related to the current [address] pandemic. Contact with this patient by the treating therapist may be made via telephonic e-visits and/or telehealth visits during this time, if applicable.

## 2020-03-26 ENCOUNTER — HOSPITAL ENCOUNTER (OUTPATIENT)
Dept: PHYSICAL THERAPY | Age: 60
Discharge: HOME | End: 2020-03-26
Payer: MEDICAID

## 2020-03-26 NOTE — PROGRESS NOTES
[facility]  [address], [address] [facility]    OUTPATIENT physical Therapy      3/26/2020:  Dejuan Notice was not seen on this date for physical therapy for the following reasons:    [x]     Patient called to cancel the visit for the following reasons: COVID-19  []     Patient missed the visit and did not call to cancel.     Mak Deluna, PT

## 2020-04-05 DIAGNOSIS — G89.29 CHRONIC MIDLINE LOW BACK PAIN WITHOUT SCIATICA: ICD-10-CM

## 2020-04-05 DIAGNOSIS — M54.50 CHRONIC MIDLINE LOW BACK PAIN WITHOUT SCIATICA: ICD-10-CM

## 2020-04-06 RX ORDER — IBUPROFEN 800 MG/1
800 TABLET ORAL
Qty: 60 TAB | Refills: 0 | Status: SHIPPED | OUTPATIENT
Start: 2020-04-06 | End: 2020-07-21 | Stop reason: SDUPTHER

## 2020-04-06 RX ORDER — CYCLOBENZAPRINE HCL 10 MG
TABLET ORAL
Qty: 30 TAB | Refills: 0 | Status: SHIPPED | OUTPATIENT
Start: 2020-04-06 | End: 2021-06-09

## 2020-07-08 ENCOUNTER — VIRTUAL VISIT (OUTPATIENT)
Dept: INTERNAL MEDICINE CLINIC | Age: 60
End: 2020-07-08

## 2020-07-08 DIAGNOSIS — M79.605 PAIN IN BOTH LOWER EXTREMITIES: ICD-10-CM

## 2020-07-08 DIAGNOSIS — M06.9 RHEUMATOID ARTHRITIS INVOLVING MULTIPLE SITES, UNSPECIFIED RHEUMATOID FACTOR PRESENCE: Primary | ICD-10-CM

## 2020-07-08 DIAGNOSIS — D86.9 SARCOID: ICD-10-CM

## 2020-07-08 DIAGNOSIS — M79.604 PAIN IN BOTH LOWER EXTREMITIES: ICD-10-CM

## 2020-07-08 DIAGNOSIS — M79.89 LOCALIZED SWELLING OF BOTH LOWER EXTREMITIES: ICD-10-CM

## 2020-07-08 RX ORDER — PREDNISONE 5 MG/1
TABLET ORAL
Qty: 91 TAB | Refills: 0 | Status: SHIPPED | OUTPATIENT
Start: 2020-07-08 | End: 2021-04-22

## 2020-07-08 NOTE — PROGRESS NOTES
Chief Complaint   Patient presents with    Swelling     feet, legs, and ankles, pt states swelling has gone down     Leg Pain     pt states left ankle and leg are still in pain      Pt rates left ankle and leg pain 8      1. Have you been to the ER, urgent care clinic since your last visit? Hospitalized since your last visit? No    2. Have you seen or consulted any other health care providers outside of the [address] [place] [address] since your last visit? Include any pap smears or colon screening.  No

## 2020-07-08 NOTE — PROGRESS NOTES
Eileen Martines is a 61 y.o. female who was seen by synchronous (real-time) audio-video technology on 7/8/2020. Consent: Eileen Martines, who was seen by synchronous (real-time) audio-video technology, and/or her healthcare decision maker, is aware that this patient-initiated, Telehealth encounter on 7/8/2020 is a billable service, with coverage as determined by her insurance carrier. She is aware that she may receive a bill and has provided verbal consent to proceed: Yes. Assessment & Plan:   Diagnoses and all orders for this visit:    1. Rheumatoid arthritis involving multiple sites, unspecified rheumatoid factor presence ([facility]    2. Sarcoid    3. Localized swelling of both lower extremities    4. Pain in both lower extremities    Other orders  -     predniSONE (DELTASONE) 5 mg tablet; Take 4 tabs daily (20mg) x 1 wk, 3 tabs daily (15mg) x 1 wk, 2 tabs daily (10 mg) x 2 wks, then 1 tab daily x 2 wks. Follow-up and Dispositions    · Return for keep 7/21 appt with [place]. Discussed with pt calling RHEUM to advise of med stop due to SE of GI upset. Educated on importance of medication adherence for disease management. Placed on long taper for RHEUM flare also. Subjective:   Eileen Martines is a 61 y.o. female who was seen for Swelling (feet, legs, and ankles, pt states swelling has gone down ) and Leg Pain (pt states left ankle and leg are still in pain )      Pt presents today with concern for lower leg swelling with pain. Onset x 10 days. No trigger or injury. H/o of RA, taking nothing currently. Prescribed plaquenil, but stopped ~ 3 weeks ago, but caused some GI symptoms. Last seen by [facility] rheum via phone visit about 1 month ago. Prednisone last taken 3-4 weeks ago. Prior to Admission medications    Medication Sig Start Date End Date Taking?  Authorizing Provider   predniSONE (DELTASONE) 5 mg tablet Take 4 tabs daily (20mg) x 1 wk, 3 tabs daily (15mg) x 1 wk, 2 tabs daily (10 mg) x 2 wks, then 1 tab daily x 2 wks. 7/8/20  Yes Madisyn Mendes NP   cyclobenzaprine (FLEXERIL) 10 mg tablet TAKE 1 TABLET BY MOUTH EVERY NIGHT AS NEEDED FOR MUSCLE SPASMS. 4/6/20  Yes Franci Goyal NP   ibuprofen (MOTRIN) 800 mg tablet TAKE 1 TAB BY MOUTH EVERY EIGHT (8) HOURS AS NEEDED FOR PAIN. 4/6/20  Yes Franci Goyal NP   Cetirizine (ZyrTEC) 10 mg cap Take 10 mg by mouth nightly. 3/19/20  Yes Franci Goyal NP   albuterol (PROVENTIL HFA, VENTOLIN HFA, PROAIR HFA) 90 mcg/actuation inhaler Take 1 Puff by inhalation every six (6) hours as needed for Wheezing. 3/19/20  Yes Franci Goyal NP   amLODIPine (NORVASC) 10 mg tablet TAKE 1 TABLET BY MOUTH EVERY DAY FOR BLOOD PRESSURE 2/19/20  Yes Rossy JACKSON NP   montelukast (SINGULAIR) 10 mg tablet TAKE 1 TABLET BY MOUTH EVERY DAY 11/14/19  Yes Provider, Historical   triamcinolone acetonide (KENALOG) 0.1 % ointment Apply  to affected area two (2) times a day. use thin layer 12/10/19  Yes Franci Goyal NP   buPROPion XL (WELLBUTRIN XL) 150 mg tablet Take 1 Tab by mouth every morning. depression 7/8/19  Yes Franci Goyal NP   FLOVENT  mcg/actuation inhaler TAKE 2 PUFFS BY MOUTH TWICE DAILY 5/30/19  Yes Provider, Historical   fluticasone propionate (FLONASE) 50 mcg/actuation nasal spray USE 2 SPRAYS IN EACH NOSTRIL TWICE A DAY 5/30/19  Yes Provider, Historical   ascorbic acid, vitamin C, (VITAMIN C) 500 mg tablet Take  by mouth. Yes Provider, Historical   cyanocobalamin 1,000 mcg tablet Take 1,000 mcg by mouth daily. Yes Provider, Historical   cholecalciferol, VITAMIN D3, (VITAMIN D3) 5,000 unit tab tablet Take  by mouth daily. Yes Provider, Historical   FOLIC ACID/MULTIVIT-MIN/LUTEIN (CENTRUM SILVER PO) Take  by mouth.    Yes Provider, Historical   bisacodyL (DULCOLAX) 5 mg EC tablet  1/2/20   Provider, Historical   hydroxychloroquine (PLAQUENIL) 200 mg tablet TAKE 1 TAB MG DAILY FOR 1 WEEK, THAN 2 TABS DAILY TO CONTINUE WITH FOOD OR MILK 1/27/20   Provider, Historical   dextroamphetamine-amphetamine (ADDERALL) 20 mg tablet Take 1 Tab by mouth two (2) times a day. Max Daily Amount: 40 mg. 6/13/19   Harjeet JACKSON, NP   clobetasol (TEMOVATE) 0.05 % topical cream Apply  to affected area two (2) times daily as needed for Skin Irritation. eczema 3/11/19   Harjeet Abishamir JACKSON, NP   FLAXSEED OIL (OMEGA 3 PO) Take  by mouth. Provider, Historical     Allergies   Allergen Reactions    Latex Rash    Iodine Rash and Other (comments)     Pt states this is due to shellfish allergy. She's not aware, she's allergic to iodine or IV contrast.     Lisinopril Cough    Shellfish Derived Hives    Simvastatin Myalgia       Patient Active Problem List   Diagnosis Code    Rheumatoid arthritis ([facility] M06.9    Adult ADHD F90.9    Depression F32.9    Hypercholesterolemia E78.00    Essential hypertension with goal blood pressure less than 140/90 I10    Mild intermittent asthma without complication I91.43    Sarcoid D86.9    Overweight (BMI 25.0-29. 9) [id number]    Elevated alkaline phosphatase level R74.8    Recurrent major depressive disorder, in partial remission ([facility] [address] F33.41    Eczema L30.9    Osteopenia M85.80    H/O: hysterectomy Z90.710     Patient Active Problem List    Diagnosis Date Noted    H/O: hysterectomy 12/10/2019    Osteopenia 07/01/2019    Eczema 03/11/2019    Recurrent major depressive disorder, in partial remission ([facility] 11/28/2017    Elevated alkaline phosphatase level 08/02/2017    Overweight (BMI 25.0-29.9) 01/26/2017    Rheumatoid arthritis ([facility] 08/09/2016    Adult ADHD 08/09/2016    Depression 08/09/2016    Hypercholesterolemia 08/09/2016    Essential hypertension with goal blood pressure less than 140/90 08/09/2016    Mild intermittent asthma without complication 05/97/2331    Sarcoid 08/09/2016     Current Outpatient Medications   Medication Sig Dispense Refill    predniSONE (DELTASONE) 5 mg tablet Take 4 tabs daily (20mg) x 1 wk, 3 tabs daily (15mg) x 1 wk, 2 tabs daily (10 mg) x 2 wks, then 1 tab daily x 2 wks. 91 Tab 0    cyclobenzaprine (FLEXERIL) 10 mg tablet TAKE 1 TABLET BY MOUTH EVERY NIGHT AS NEEDED FOR MUSCLE SPASMS. 30 Tab 0    ibuprofen (MOTRIN) 800 mg tablet TAKE 1 TAB BY MOUTH EVERY EIGHT (8) HOURS AS NEEDED FOR PAIN. 60 Tab 0    Cetirizine (ZyrTEC) 10 mg cap Take 10 mg by mouth nightly. 90 Cap 3    albuterol (PROVENTIL HFA, VENTOLIN HFA, PROAIR HFA) 90 mcg/actuation inhaler Take 1 Puff by inhalation every six (6) hours as needed for Wheezing. 1 Inhaler 3    amLODIPine (NORVASC) 10 mg tablet TAKE 1 TABLET BY MOUTH EVERY DAY FOR BLOOD PRESSURE 30 Tab 5    montelukast (SINGULAIR) 10 mg tablet TAKE 1 TABLET BY MOUTH EVERY DAY  4    triamcinolone acetonide (KENALOG) 0.1 % ointment Apply  to affected area two (2) times a day. use thin layer 30 g 0    buPROPion XL (WELLBUTRIN XL) 150 mg tablet Take 1 Tab by mouth every morning. depression 90 Tab 1    FLOVENT  mcg/actuation inhaler TAKE 2 PUFFS BY MOUTH TWICE DAILY  3    fluticasone propionate (FLONASE) 50 mcg/actuation nasal spray USE 2 SPRAYS IN EACH NOSTRIL TWICE A DAY  6    ascorbic acid, vitamin C, (VITAMIN C) 500 mg tablet Take  by mouth.  cyanocobalamin 1,000 mcg tablet Take 1,000 mcg by mouth daily.  cholecalciferol, VITAMIN D3, (VITAMIN D3) 5,000 unit tab tablet Take  by mouth daily.  FOLIC ACID/MULTIVIT-MIN/LUTEIN (CENTRUM SILVER PO) Take  by mouth.  bisacodyL (DULCOLAX) 5 mg EC tablet       hydroxychloroquine (PLAQUENIL) 200 mg tablet TAKE 1 TAB MG DAILY FOR 1 WEEK, THAN 2 TABS DAILY TO CONTINUE WITH FOOD OR MILK      dextroamphetamine-amphetamine (ADDERALL) 20 mg tablet Take 1 Tab by mouth two (2) times a day. Max Daily Amount: 40 mg. 60 Tab 0    clobetasol (TEMOVATE) 0.05 % topical cream Apply  to affected area two (2) times daily as needed for Skin Irritation.  eczema 30 g 1  FLAXSEED OIL (OMEGA 3 PO) Take  by mouth. Allergies   Allergen Reactions    Latex Rash    Iodine Rash and Other (comments)     Pt states this is due to shellfish allergy. She's not aware, she's allergic to iodine or IV contrast.     Lisinopril Cough    Shellfish Derived Hives    Simvastatin Myalgia     Past Medical History:   Diagnosis Date    Adult ADHD     Calculus of kidney     Depression     Hypercholesterolemia     Hypertension     Rheumatoid arthritis ([address]     Sarcoid     Sinus Infection      Past Surgical History:   Procedure Laterality Date    HX BREAST BIOPSY      ? left breast - benign- no visible scar    HX MOHS PROCEDURES      HX ORTHOPAEDIC      HX PARTIAL HYSTERECTOMY       Family History   Problem Relation Age of Onset    Hypertension Mother     Diabetes Mother     Stroke Mother          ~ 65 yo    Heart Disease Father          ~ 78 yo    Breast Cancer Cousin         mat. 1st cousin     Social History     Tobacco Use    Smoking status: Never Smoker    Smokeless tobacco: Never Used   Substance Use Topics    Alcohol use: No       Review of Systems   Constitutional: Negative for fever and malaise/fatigue. Eyes: Negative for blurred vision. Respiratory: Negative for cough and shortness of breath. Cardiovascular: Negative for chest pain and leg swelling. Neurological: Negative for dizziness, weakness and headaches. Objective:   Vital Signs: (As obtained by patient/caregiver at home)  There were no vitals taken for this visit. Physical Exam:  General appearance - alert, well appearing, and in no distress. Mental status - A/O x 4,normal mood and affect. Eyes- no periorbital edema, drainage, or irritation noted. Nose- no obvious drainage or swelling. Throat- no obvious swelling, goiter, or notable lymphadenopathy  Chest - Symmetric chest rise. No wheezing or coughing. No distress. Skin- normal skin tone noted.  No hyperpigmentation or obvious deformities. No diaphoresis noted. No flushing. Neuro - Normal speech, no focal findings or movement disorder. Other pertinent observable physical exam findings:-        We discussed the expected course, resolution and complications of the diagnosis(es) in detail. Medication risks, benefits, costs, interactions, and alternatives were discussed as indicated. I advised her to contact the office if her condition worsens, changes or fails to improve as anticipated. She expressed understanding with the diagnosis(es) and plan. Thao Chery is a 61 y.o. female who was evaluated by a video visit encounter for concerns as above. Patient identification was verified prior to start of the visit. A caregiver was present when appropriate. Due to this being a TeleHealth encounter (During FIYMU-86 public health emergency), evaluation of the following organ systems was limited: Vitals/Constitutional/EENT/Resp/CV/GI//MS/Neuro/Skin/Heme-Lymph-Imm. Pursuant to the emergency declaration under the [facility], [facility] waiver authority and the [employer] Act, this Virtual  Visit was conducted, with patient's (and/or legal guardian's) consent, to reduce the patient's risk of exposure to COVID-19 and provide necessary medical care. Services were provided through a video synchronous discussion virtually to substitute for in-person clinic visit. Patient and provider were located at their individual homes.       Alma Edmondson NP

## 2020-07-08 NOTE — PATIENT INSTRUCTIONS
Rheumatoid Arthritis Diet: Care Instructions Your Care Instructions The best diet for people with rheumatoid arthritis is a healthy, balanced diet that is low in saturated fat and salt and high in fiber and complex carbohydrate (whole grains, beans, fruits, and vegetables). Fish oil (omega-3 fatty acids) has a modest effect in reducing inflammation, and eating fish may improve symptoms. People who have rheumatoid arthritis have a high risk of developing osteoporosis. To help prevent this disease, get plenty of calcium and vitamin D. Follow-up care is a key part of your treatment and safety. Be sure to make and go to all appointments, and call your doctor if you are having problems. It's also a good idea to know your test results and keep a list of the medicines you take. How can you care for yourself at home? · Try to eat at least 2 servings of fish each week. Oily fish, which contain omega-3 fatty acids, include: ? [place]. ? [place]. ? Mackerel. ? Lake trout. ? Herring. ? Sardines. · If you're pregnant, talk to your doctor about eating fish. Pregnant women shouldn't eat certain types of fish that have high mercury content. · You can get calcium and vitamin D by drinking milk fortified with vitamin D. Four glasses of milk a day provide about 1,200 milligrams (mg) of calcium. Other common foods with calcium: 
? Yogurt (plain or low-fat). An 8-ounce serving provides 415 mg of calcium. ? Cheddar cheese. A 1½-ounce serving provides 306 mg. 
? Milk (skim, 2%, or whole). A 1-cup serving provides about 300 mg. 
? Cottage cheese (1% milk fat). A 1-cup serving provides 138 mg. 
· If you can't eat or drink dairy foods, you can get calcium and vitamin D from: 
? Calcium-fortified orange juice. A 1-cup serving provides 500 mg of calcium. ? Calcium-enriched soy milk. A 1-cup serving provides 282 mg of calcium. ? Almonds. A 1-ounce serving (about 24 nuts) provides 75 mg of calcium. ? Canned salmon. A 3-ounce serving provides 180 mg of calcium. ? Tofu (firm, made with calcium sulfate). A ½-cup serving provides 204 mg. · You may need to take a calcium supplement to make sure you are getting the calcium you need. Where can you learn more? Go to http://www.gray.com/ Enter Q201 in the search box to learn more about \"Rheumatoid Arthritis Diet: Care Instructions. \" Current as of: August 22, 2019               Content Version: 12.5 © 2816-4217 [employer]. Care instructions adapted under license by [employer] (which disclaims liability or warranty for this information). If you have questions about a medical condition or this instruction, always ask your healthcare professional. [address] any warranty or liability for your use of this information.

## 2020-07-21 ENCOUNTER — OFFICE VISIT (OUTPATIENT)
Dept: INTERNAL MEDICINE CLINIC | Age: 60
End: 2020-07-21

## 2020-07-21 VITALS
TEMPERATURE: 97.9 F | HEIGHT: 68 IN | BODY MASS INDEX: 29.4 KG/M2 | HEART RATE: 76 BPM | WEIGHT: 194 LBS | SYSTOLIC BLOOD PRESSURE: 123 MMHG | RESPIRATION RATE: 18 BRPM | DIASTOLIC BLOOD PRESSURE: 85 MMHG | OXYGEN SATURATION: 97 %

## 2020-07-21 DIAGNOSIS — L30.9 ECZEMA, UNSPECIFIED TYPE: ICD-10-CM

## 2020-07-21 DIAGNOSIS — G89.29 CHRONIC MIDLINE LOW BACK PAIN WITHOUT SCIATICA: Primary | ICD-10-CM

## 2020-07-21 DIAGNOSIS — Z12.39 SCREENING BREAST EXAMINATION: ICD-10-CM

## 2020-07-21 DIAGNOSIS — M06.9 RHEUMATOID ARTHRITIS, INVOLVING UNSPECIFIED SITE, UNSPECIFIED RHEUMATOID FACTOR PRESENCE: ICD-10-CM

## 2020-07-21 DIAGNOSIS — M54.50 CHRONIC MIDLINE LOW BACK PAIN WITHOUT SCIATICA: Primary | ICD-10-CM

## 2020-07-21 RX ORDER — CLOBETASOL PROPIONATE 0.5 MG/G
CREAM TOPICAL
Qty: 30 G | Refills: 1 | Status: SHIPPED | OUTPATIENT
Start: 2020-07-21 | End: 2020-07-21

## 2020-07-21 RX ORDER — IBUPROFEN 800 MG/1
800 TABLET ORAL
Qty: 60 TAB | Refills: 0 | Status: SHIPPED | OUTPATIENT
Start: 2020-07-21 | End: 2020-08-11

## 2020-07-21 NOTE — PROGRESS NOTES
Chief Complaint   Patient presents with    Hypertension     6 month follow up     Back Pain     follow up     Medication Evaluation     pt thinks a medication is causing her foot and leg swelling, unsure which med        1. Have you been to the ER, urgent care clinic since your last visit? Hospitalized since your last visit? No    2. Have you seen or consulted any other health care providers outside of the [address] [place] [address] since your last visit? Include any pap smears or colon screening.  No

## 2020-07-21 NOTE — PROGRESS NOTES
Subjective: (As above and below)     Chief Complaint   Patient presents with    Hypertension     6 month follow up     Back Pain     follow up     Medication Evaluation     pt thinks a medication is causing her foot and leg swelling, unsure which med      Ethan Mayen is a 61y.o. year old female who presents for     Hypertension ROS:  taking medications as instructed, no medication side effects noted, no TIAs, no chest pain on exertion, no dyspnea on exertion, no swelling of ankles    Rheumatoid arthritis: followed by rheum, long hx of various treatments including gold injections. last used plaquenil which caused too much GI s/e    Sarcoid : followed by pulm    She recently saw other NP for leg pain and swelling, thought to be r/t flare, she is currently on pred taper. She states that her leg swelling is somewhat improved. Swelling is bilat L>R. It does improve w/ elevation    She is also followed by podiatry, and had been having pain to her feet, she states they felt she had sciatica    She does have LBP, no inciting injury, has been present on and off for some time. Last imaging 3 years ago? Normal. Denies saddle numbness, leg weakness, falls or bowel/bladder dysfunction. She is followed by psych for depression and ADHD - she is planning on getting neuropsych testing at some point    Reviewed PmHx, RxHx, FmHx, SocHx, AllgHx and updated in chart. Family History   Problem Relation Age of Onset    Hypertension Mother     Diabetes Mother     Stroke Mother          ~ 67 yo    Heart Disease Father          ~ 78 yo    Breast Cancer Cousin         mat.  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 No pets in the house. Current Outpatient Medications   Medication Sig    ibuprofen (MOTRIN) 800 mg tablet Take 1 Tab by mouth every eight (8) hours as needed for Pain.  predniSONE (DELTASONE) 5 mg tablet Take 4 tabs daily (20mg) x 1 wk, 3 tabs daily (15mg) x 1 wk, 2 tabs daily (10 mg) x 2 wks, then 1 tab daily x 2 wks.  cyclobenzaprine (FLEXERIL) 10 mg tablet TAKE 1 TABLET BY MOUTH EVERY NIGHT AS NEEDED FOR MUSCLE SPASMS.  Cetirizine (ZyrTEC) 10 mg cap Take 10 mg by mouth nightly.  albuterol (PROVENTIL HFA, VENTOLIN HFA, PROAIR HFA) 90 mcg/actuation inhaler Take 1 Puff by inhalation every six (6) hours as needed for Wheezing.  amLODIPine (NORVASC) 10 mg tablet TAKE 1 TABLET BY MOUTH EVERY DAY FOR BLOOD PRESSURE    montelukast (SINGULAIR) 10 mg tablet TAKE 1 TABLET BY MOUTH EVERY DAY    buPROPion XL (WELLBUTRIN XL) 150 mg tablet Take 1 Tab by mouth every morning. depression    FLOVENT  mcg/actuation inhaler TAKE 2 PUFFS BY MOUTH TWICE DAILY    fluticasone propionate (FLONASE) 50 mcg/actuation nasal spray USE 2 SPRAYS IN EACH NOSTRIL TWICE A DAY    ascorbic acid, vitamin C, (VITAMIN C) 500 mg tablet Take  by mouth.  cyanocobalamin 1,000 mcg tablet Take 1,000 mcg by mouth daily.  cholecalciferol, VITAMIN D3, (VITAMIN D3) 5,000 unit tab tablet Take  by mouth daily.  FOLIC ACID/MULTIVIT-MIN/LUTEIN (CENTRUM SILVER PO) Take  by mouth.     triamcinolone acetonide (KENALOG) 0.1 % ointment Apply  to affected area two (2) times a day. use thin layer    dextroamphetamine-amphetamine (ADDERALL) 20 mg tablet Take 1 Tab by mouth two (2) times a day. Max Daily Amount: 40 mg. No current facility-administered medications for this visit. Review of Systems:   Constitutional:    Negative for fever and chills, negative diaphoresis. HEENT:              Negative for neck pain and stiffness. Eyes:                  Negative for visual disturbance, itching, redness or discharge. Respiratory:        Negative for cough and shortness of breath. Cardiovascular:  Negative for chest pain and palpitations. Gastrointestinal: Negative for nausea, vomiting, abdominal pain, diarrhea or constipation. Genitourinary:     Negative for dysuria and frequency. Musculoskeletal: LBP, mid back pain  Skin:                    Negative for rash, masses or lesions. Neurological:       Negative for dizzyness, seizure, loss of consciousness, weakness and numbness.      Objective:     Vitals:    07/21/20 1432   BP: 123/85   Pulse: 76   Resp: 18   Temp: 97.9 °F (36.6 °C)   TempSrc: Temporal   SpO2: 97%   Weight: 194 lb (88 kg)   Height: 5' 8\" (1.727 m)       Results for orders placed or performed in visit on 18/91/73   METABOLIC PANEL, BASIC   Result Value Ref Range    Glucose 98 65 - 99 mg/dL    BUN 14 6 - 24 mg/dL    Creatinine 1.01 (H) 0.57 - 1.00 mg/dL    GFR est non-AA 61 >59 mL/min/1.73    GFR est AA 70 >59 mL/min/1.73    BUN/Creatinine ratio 14 9 - 23    Sodium 140 134 - 144 mmol/L    Potassium 4.4 3.5 - 5.2 mmol/L    Chloride 102 96 - 106 mmol/L    CO2 23 20 - 29 mmol/L    Calcium 9.8 8.7 - 10.2 mg/dL   URIC ACID   Result Value Ref Range    Uric acid 5.5 2.5 - 7.1 mg/dL         Physical Examination: General appearance - alert, well appearing, and in no distress  Chest - clear to auscultation, no wheezes, rales or rhonchi, symmetric air entry  Heart - normal rate, regular rhythm, normal S1, S2, no murmurs, rubs, clicks or gallops  Extremities - +1 pitting edema to L ankle. No calf redness, warmth or swelling  Back: ttp to lumbar and thoracic spine and associated paraspinals. Gait normal    Assessment/ Plan:       1. Eczema, unspecified type  Med refill    2. Chronic midline low back pain without sciatica  She expresses interest in seeing ortho  - ibuprofen (MOTRIN) 800 mg tablet; Take 1 Tab by mouth every eight (8) hours as needed for Pain. Dispense: 60 Tab; Refill: 0  - XR SPINE LUMB 2 OR 3 V; Future  - XR SPINE THORAC 3 V; Future  - REFERRAL TO ORTHOPEDICS    3. Rheumatoid arthritis, involving unspecified site, unspecified rheumatoid factor presence ([facility] [address]  Cont care w/ rheum    4. Screening breast examination    - [facility] MAMMO BI SCREENING INCL CAD; Future      I have discussed the diagnosis with the patient and the intended plan as seen in the above orders. The patient has received an after-visit summary and questions were answered concerning future plans. Pt conveyed understanding of plan. Medication Side Effects and Warnings were discussed with patient: yes  Patient Labs were reviewed: yes  Patient Past Records were reviewed:  yes    Albert Arce.  Lokesh Peña NP

## 2020-07-23 ENCOUNTER — HOSPITAL ENCOUNTER (OUTPATIENT)
Dept: MAMMOGRAPHY | Age: 60
Discharge: HOME | End: 2020-07-23
Attending: NURSE PRACTITIONER
Payer: MEDICAID

## 2020-07-23 DIAGNOSIS — Z12.39 SCREENING BREAST EXAMINATION: ICD-10-CM

## 2020-07-23 PROCEDURE — 77067 SCR MAMMO BI INCL CAD: CPT

## 2020-08-10 DIAGNOSIS — G89.29 CHRONIC MIDLINE LOW BACK PAIN WITHOUT SCIATICA: ICD-10-CM

## 2020-08-10 DIAGNOSIS — M54.50 CHRONIC MIDLINE LOW BACK PAIN WITHOUT SCIATICA: ICD-10-CM

## 2020-08-11 RX ORDER — IBUPROFEN 800 MG/1
800 TABLET ORAL
Qty: 60 TAB | Refills: 0 | Status: SHIPPED | OUTPATIENT
Start: 2020-08-11 | End: 2020-09-01

## 2020-08-15 DIAGNOSIS — I10 ESSENTIAL HYPERTENSION WITH GOAL BLOOD PRESSURE LESS THAN 140/90: ICD-10-CM

## 2020-08-17 RX ORDER — AMLODIPINE BESYLATE 10 MG/1
TABLET ORAL
Qty: 30 TAB | Refills: 5 | Status: SHIPPED | OUTPATIENT
Start: 2020-08-17 | End: 2021-03-01

## 2020-08-30 DIAGNOSIS — M54.50 CHRONIC MIDLINE LOW BACK PAIN WITHOUT SCIATICA: ICD-10-CM

## 2020-08-30 DIAGNOSIS — G89.29 CHRONIC MIDLINE LOW BACK PAIN WITHOUT SCIATICA: ICD-10-CM

## 2020-09-01 RX ORDER — IBUPROFEN 800 MG/1
800 TABLET ORAL
Qty: 60 TAB | Refills: 0 | Status: SHIPPED | OUTPATIENT
Start: 2020-09-01 | End: 2020-10-03

## 2020-09-04 NOTE — PROGRESS NOTES
[facility]  [address], [address] [facility]    OUTPATIENT physical Therapy discharge note      9/4/2020:  Patient will be discharged from physical therapy at this time. Criteria for termination of care:    []           Patient has plateaued  []           Patient has not returned to therapy  []           Patient has missed three or more visits without prior notification  [x]           Other: stopped coming due to [place] pandemic    Patient was seen for 8 visits from 12/19/19 to 3/16/20. Please refer to the most recent progress note for the latest PT info available. If you need anything further faxed to you, please contact us at [phone].     Thank you for this referral.  Vandana Parnell, PT

## 2020-09-24 ENCOUNTER — HOSPITAL ENCOUNTER (OUTPATIENT)
Dept: MRI IMAGING | Age: 60
Discharge: HOME | End: 2020-09-24
Attending: ORTHOPAEDIC SURGERY
Payer: MEDICAID

## 2020-09-24 DIAGNOSIS — M54.50 LOW BACK PAIN, UNSPECIFIED BACK PAIN LATERALITY, UNSPECIFIED CHRONICITY, UNSPECIFIED WHETHER SCIATICA PRESENT: ICD-10-CM

## 2020-09-24 DIAGNOSIS — M79.18 MYOFASCIAL PAIN: ICD-10-CM

## 2020-09-24 DIAGNOSIS — M54.6 PAIN IN THORACIC SPINE: ICD-10-CM

## 2020-09-24 DIAGNOSIS — M54.32 SCIATICA OF LEFT SIDE: ICD-10-CM

## 2020-09-24 DIAGNOSIS — M43.10 ACQUIRED SPONDYLOLISTHESIS: ICD-10-CM

## 2020-09-24 PROCEDURE — 72148 MRI LUMBAR SPINE W/O DYE: CPT

## 2020-10-02 DIAGNOSIS — M54.50 CHRONIC MIDLINE LOW BACK PAIN WITHOUT SCIATICA: ICD-10-CM

## 2020-10-02 DIAGNOSIS — G89.29 CHRONIC MIDLINE LOW BACK PAIN WITHOUT SCIATICA: ICD-10-CM

## 2020-10-03 RX ORDER — IBUPROFEN 800 MG/1
800 TABLET ORAL
Qty: 60 TAB | Refills: 0 | Status: SHIPPED | OUTPATIENT
Start: 2020-10-03 | End: 2020-10-26

## 2020-10-25 DIAGNOSIS — M54.50 CHRONIC MIDLINE LOW BACK PAIN WITHOUT SCIATICA: ICD-10-CM

## 2020-10-25 DIAGNOSIS — G89.29 CHRONIC MIDLINE LOW BACK PAIN WITHOUT SCIATICA: ICD-10-CM

## 2020-10-26 RX ORDER — IBUPROFEN 800 MG/1
800 TABLET ORAL
Qty: 60 TAB | Refills: 0 | Status: SHIPPED | OUTPATIENT
Start: 2020-10-26 | End: 2020-11-20

## 2020-11-04 ENCOUNTER — TELEPHONE (OUTPATIENT)
Dept: INTERNAL MEDICINE CLINIC | Age: 60
End: 2020-11-04

## 2020-11-04 NOTE — TELEPHONE ENCOUNTER
FYI -  Pt states she received a letter ref to her referrals. She states she has seen ortho, pulmonologist and rheumatologist. She was not satisfied with the rheumatologist so the pulmonologist referred her to someone else.  She also had MRI 10/16/2020 @ [facility] and has a fu  appt with ortho tomorrow and fu with pulmonologist on 11/24/2020  and 2/16 2021  Pt # [id number]

## 2020-11-20 DIAGNOSIS — G89.29 CHRONIC MIDLINE LOW BACK PAIN WITHOUT SCIATICA: ICD-10-CM

## 2020-11-20 DIAGNOSIS — M54.50 CHRONIC MIDLINE LOW BACK PAIN WITHOUT SCIATICA: ICD-10-CM

## 2020-11-20 RX ORDER — IBUPROFEN 800 MG/1
800 TABLET ORAL
Qty: 60 TAB | Refills: 0 | Status: SHIPPED | OUTPATIENT
Start: 2020-11-20 | End: 2020-12-13

## 2020-12-11 DIAGNOSIS — M54.50 CHRONIC MIDLINE LOW BACK PAIN WITHOUT SCIATICA: ICD-10-CM

## 2020-12-11 DIAGNOSIS — G89.29 CHRONIC MIDLINE LOW BACK PAIN WITHOUT SCIATICA: ICD-10-CM

## 2020-12-13 RX ORDER — IBUPROFEN 800 MG/1
800 TABLET ORAL
Qty: 60 TAB | Refills: 0 | Status: SHIPPED | OUTPATIENT
Start: 2020-12-13 | End: 2021-01-08

## 2021-01-07 DIAGNOSIS — M54.50 CHRONIC MIDLINE LOW BACK PAIN WITHOUT SCIATICA: ICD-10-CM

## 2021-01-07 DIAGNOSIS — G89.29 CHRONIC MIDLINE LOW BACK PAIN WITHOUT SCIATICA: ICD-10-CM

## 2021-01-08 RX ORDER — IBUPROFEN 800 MG/1
800 TABLET ORAL
Qty: 60 TAB | Refills: 0 | Status: SHIPPED | OUTPATIENT
Start: 2021-01-08 | End: 2021-02-23

## 2021-02-18 ENCOUNTER — TRANSCRIBE ORDER (OUTPATIENT)
Dept: SCHEDULING | Age: 61
End: 2021-02-18

## 2021-02-18 DIAGNOSIS — R91.8 MULTIPLE LUNG NODULES: Primary | ICD-10-CM

## 2021-02-22 DIAGNOSIS — G89.29 CHRONIC MIDLINE LOW BACK PAIN WITHOUT SCIATICA: ICD-10-CM

## 2021-02-22 DIAGNOSIS — M54.50 CHRONIC MIDLINE LOW BACK PAIN WITHOUT SCIATICA: ICD-10-CM

## 2021-02-23 RX ORDER — IBUPROFEN 800 MG/1
800 TABLET ORAL
Qty: 60 TAB | Refills: 0 | Status: SHIPPED | OUTPATIENT
Start: 2021-02-23 | End: 2021-04-01

## 2021-03-31 DIAGNOSIS — G89.29 CHRONIC MIDLINE LOW BACK PAIN WITHOUT SCIATICA: ICD-10-CM

## 2021-03-31 DIAGNOSIS — M54.50 CHRONIC MIDLINE LOW BACK PAIN WITHOUT SCIATICA: ICD-10-CM

## 2021-04-01 RX ORDER — IBUPROFEN 800 MG/1
800 TABLET ORAL
Qty: 60 TAB | Refills: 0 | Status: SHIPPED | OUTPATIENT
Start: 2021-04-01 | End: 2021-08-06

## 2021-04-22 ENCOUNTER — OFFICE VISIT (OUTPATIENT)
Dept: INTERNAL MEDICINE CLINIC | Age: 61
End: 2021-04-22
Payer: MEDICAID

## 2021-04-22 ENCOUNTER — HOSPITAL ENCOUNTER (OUTPATIENT)
Dept: GENERAL RADIOLOGY | Age: 61
Discharge: HOME | End: 2021-04-22
Payer: MEDICAID

## 2021-04-22 VITALS
RESPIRATION RATE: 18 BRPM | OXYGEN SATURATION: 97 % | WEIGHT: 192 LBS | SYSTOLIC BLOOD PRESSURE: 136 MMHG | HEIGHT: 68 IN | DIASTOLIC BLOOD PRESSURE: 86 MMHG | HEART RATE: 80 BPM | BODY MASS INDEX: 29.1 KG/M2 | TEMPERATURE: 98.2 F

## 2021-04-22 DIAGNOSIS — M06.9 RHEUMATOID ARTHRITIS, INVOLVING UNSPECIFIED SITE, UNSPECIFIED WHETHER RHEUMATOID FACTOR PRESENT (HCC): ICD-10-CM

## 2021-04-22 DIAGNOSIS — E78.2 MIXED HYPERLIPIDEMIA: ICD-10-CM

## 2021-04-22 DIAGNOSIS — M25.561 ACUTE PAIN OF RIGHT KNEE: ICD-10-CM

## 2021-04-22 DIAGNOSIS — M79.89 LEG SWELLING: Primary | ICD-10-CM

## 2021-04-22 DIAGNOSIS — I10 ESSENTIAL HYPERTENSION: ICD-10-CM

## 2021-04-22 DIAGNOSIS — F90.2 ATTENTION DEFICIT HYPERACTIVITY DISORDER (ADHD), COMBINED TYPE: ICD-10-CM

## 2021-04-22 DIAGNOSIS — F90.0 ATTENTION DEFICIT HYPERACTIVITY DISORDER (ADHD), PREDOMINANTLY INATTENTIVE TYPE: ICD-10-CM

## 2021-04-22 PROCEDURE — 73562 X-RAY EXAM OF KNEE 3: CPT

## 2021-04-22 PROCEDURE — 99214 OFFICE O/P EST MOD 30 MIN: CPT | Performed by: NURSE PRACTITIONER

## 2021-04-22 RX ORDER — TRIAMCINOLONE ACETONIDE 1 MG/G
OINTMENT TOPICAL 2 TIMES DAILY
Qty: 30 G | Refills: 0 | Status: SHIPPED | OUTPATIENT
Start: 2021-04-22 | End: 2021-07-11

## 2021-04-22 RX ORDER — DEXTROAMPHETAMINE SACCHARATE, AMPHETAMINE ASPARTATE, DEXTROAMPHETAMINE SULFATE AND AMPHETAMINE SULFATE 5; 5; 5; 5 MG/1; MG/1; MG/1; MG/1
20 TABLET ORAL 2 TIMES DAILY
Qty: 60 TAB | Refills: 0 | Status: SHIPPED | OUTPATIENT
Start: 2021-04-22 | End: 2022-09-15

## 2021-04-22 NOTE — PROGRESS NOTES
Chief Complaint   Patient presents with    Cyst       1. Have you been to the ER, urgent care clinic since your last visit? Hospitalized since your last visit? No    2. Have you seen or consulted any other health care providers outside of the [address] [place] [address] since your last visit? Include any pap smears or colon screening.  Yes When: 11/2020 Where: Dr. Howard Linda Reason for visit: back surgery

## 2021-04-23 DIAGNOSIS — R74.8 ELEVATED ALKALINE PHOSPHATASE LEVEL: Primary | ICD-10-CM

## 2021-04-23 LAB
ALBUMIN SERPL-MCNC: 5.1 G/DL (ref 3.8–4.9)
ALBUMIN/GLOB SERPL: 2.1 {RATIO} (ref 1.2–2.2)
ALP SERPL-CCNC: 153 IU/L (ref 39–117)
ALT SERPL-CCNC: 9 IU/L (ref 0–32)
AST SERPL-CCNC: 19 IU/L (ref 0–40)
BILIRUB SERPL-MCNC: 0.5 MG/DL (ref 0–1.2)
BUN SERPL-MCNC: 18 MG/DL (ref 8–27)
BUN/CREAT SERPL: 20 (ref 12–28)
CALCIUM SERPL-MCNC: 10.2 MG/DL (ref 8.7–10.3)
CHLORIDE SERPL-SCNC: 102 MMOL/L (ref 96–106)
CHOLEST SERPL-MCNC: 253 MG/DL (ref 100–199)
CO2 SERPL-SCNC: 25 MMOL/L (ref 20–29)
CREAT SERPL-MCNC: 0.88 MG/DL (ref 0.57–1)
ERYTHROCYTE [DISTWIDTH] IN BLOOD BY AUTOMATED COUNT: 13.7 % (ref 11.7–15.4)
GLOBULIN SER CALC-MCNC: 2.4 G/DL (ref 1.5–4.5)
GLUCOSE SERPL-MCNC: 98 MG/DL (ref 65–99)
HCT VFR BLD AUTO: 44.2 % (ref 34–46.6)
HDLC SERPL-MCNC: 45 MG/DL
HGB BLD-MCNC: 14.7 G/DL (ref 11.1–15.9)
IMP & REVIEW OF LAB RESULTS: NORMAL
LDLC SERPL CALC-MCNC: 180 MG/DL (ref 0–99)
MCH RBC QN AUTO: 30.4 PG (ref 26.6–33)
MCHC RBC AUTO-ENTMCNC: 33.3 G/DL (ref 31.5–35.7)
MCV RBC AUTO: 92 FL (ref 79–97)
PLATELET # BLD AUTO: 261 X10E3/UL (ref 150–450)
POTASSIUM SERPL-SCNC: 4.4 MMOL/L (ref 3.5–5.2)
PROT SERPL-MCNC: 7.5 G/DL (ref 6–8.5)
RBC # BLD AUTO: 4.83 X10E6/UL (ref 3.77–5.28)
SODIUM SERPL-SCNC: 142 MMOL/L (ref 134–144)
TRIGL SERPL-MCNC: 150 MG/DL (ref 0–149)
VLDLC SERPL CALC-MCNC: 28 MG/DL (ref 5–40)
WBC # BLD AUTO: 3.2 X10E3/UL (ref 3.4–10.8)

## 2021-04-23 NOTE — PROGRESS NOTES
Subjective: (As above and below)     Chief Complaint   Patient presents with    Follow-up    Leg Pain     right leg x 3/14/2021, pt states leg pain started after receiving J&J, pt states pain has been radiating up leg and leg was swollen      Twan MosquedaAlecia Amos is a 61y.o. year old female who presents for     Right leg swelling and pain: noticed a few days after getting her J&J Covid vaccine  She had swelling to lower leg which is resolved  She has residual lower ext cramping and knee pain  She denies any sob, heart racing, back pains    Hypertension ROS:  taking medications as instructed, no medication side effects noted, no TIAs, no chest pain on exertion, no dyspnea on exertion, no swelling of ankles    Asthma: well controlled    Hyperlipidemia: tolerating statin    ADD; struggling to get appt w/ psychiatry. Was doing well on adderall. Does not take daily  Notes difficulty focusing, staying on task which causes some anxiety    RA: needs new rheum referral, s/s stable    Reviewed PmHx, RxHx, FmHx, SocHx, AllgHx and updated in chart. Family History   Problem Relation Age of Onset    Hypertension Mother     Diabetes Mother     Stroke Mother          ~ 65 yo    Heart Disease Father          ~ 80 yo    Breast Cancer Cousin         mat.  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 No pets in the house. Current Outpatient Medications   Medication Sig    triamcinolone acetonide (KENALOG) 0.1 % ointment Apply  to affected area two (2) times a day. use thin layer    dextroamphetamine-amphetamine (ADDERALL) 20 mg tablet Take 1 Tab by mouth two (2) times a day. Max Daily Amount: 40 mg.    ibuprofen (MOTRIN) 800 mg tablet TAKE 1 TAB BY MOUTH EVERY EIGHT (8) HOURS AS NEEDED FOR PAIN.  amLODIPine (NORVASC) 10 mg tablet TAKE 1 TABLET BY MOUTH EVERY DAY FOR BLOOD PRESSURE    cyclobenzaprine (FLEXERIL) 10 mg tablet TAKE 1 TABLET BY MOUTH EVERY NIGHT AS NEEDED FOR MUSCLE SPASMS.  Cetirizine (ZyrTEC) 10 mg cap Take 10 mg by mouth nightly.  albuterol (PROVENTIL HFA, VENTOLIN HFA, PROAIR HFA) 90 mcg/actuation inhaler Take 1 Puff by inhalation every six (6) hours as needed for Wheezing.  montelukast (SINGULAIR) 10 mg tablet TAKE 1 TABLET BY MOUTH EVERY DAY    FLOVENT  mcg/actuation inhaler TAKE 2 PUFFS BY MOUTH TWICE DAILY    fluticasone propionate (FLONASE) 50 mcg/actuation nasal spray USE 2 SPRAYS IN EACH NOSTRIL TWICE A DAY    cyanocobalamin 1,000 mcg tablet Take 1,000 mcg by mouth daily.  cholecalciferol, VITAMIN D3, (VITAMIN D3) 5,000 unit tab tablet Take  by mouth daily.  FOLIC ACID/MULTIVIT-MIN/LUTEIN (CENTRUM SILVER PO) Take  by mouth.  buPROPion XL (WELLBUTRIN XL) 150 mg tablet Take 1 Tab by mouth every morning. depression    ascorbic acid, vitamin C, (VITAMIN C) 500 mg tablet Take  by mouth. No current facility-administered medications for this visit. Review of Systems:   Constitutional:    Negative for fever and chills, negative diaphoresis. HEENT:              Negative for neck pain and stiffness.   Eyes:                  Negative for visual disturbance, itching, redness or discharge. Respiratory:        Negative for cough and shortness of breath. Cardiovascular:  Negative for chest pain and palpitations. Gastrointestinal: Negative for nausea, vomiting, abdominal pain, diarrhea or constipation. Genitourinary:     Negative for dysuria and frequency. Musculoskeletal: Negative for falls, tenderness and swelling. Skin:                    Negative for rash, masses or lesions. Neurological:       Negative for dizzyness, seizure, loss of consciousness, weakness and numbness. Objective:     Vitals:    04/22/21 1414   BP: 136/86   Pulse: 80   Resp: 18   Temp: 98.2 °F (36.8 °C)   TempSrc: Temporal   SpO2: 97%   Weight: 192 lb (87.1 kg)   Height: 5' 8\" (1.727 m)       Results for orders placed or performed in visit on 33/36/01   METABOLIC PANEL, COMPREHENSIVE   Result Value Ref Range    Glucose 98 65 - 99 mg/dL    BUN 18 8 - 27 mg/dL    Creatinine 0.88 0.57 - 1.00 mg/dL    GFR est non-AA 72 >59 mL/min/1.73    GFR est AA 83 >59 mL/min/1.73    BUN/Creatinine ratio 20 12 - 28    Sodium 142 134 - 144 mmol/L    Potassium 4.4 3.5 - 5.2 mmol/L    Chloride 102 96 - 106 mmol/L    CO2 25 20 - 29 mmol/L    Calcium 10.2 8.7 - 10.3 mg/dL    Protein, total 7.5 6.0 - 8.5 g/dL    Albumin 5.1 (H) 3.8 - 4.9 g/dL    GLOBULIN, TOTAL 2.4 1.5 - 4.5 g/dL    A-G Ratio 2.1 1.2 - 2.2    Bilirubin, total 0.5 0.0 - 1.2 mg/dL    Alk.  phosphatase 153 (H) 39 - 117 IU/L    AST (SGOT) 19 0 - 40 IU/L    ALT (SGPT) 9 0 - 32 IU/L   CBC W/O DIFF   Result Value Ref Range    WBC 3.2 (L) 3.4 - 10.8 x10E3/uL    RBC 4.83 3.77 - 5.28 x10E6/uL    HGB 14.7 11.1 - 15.9 g/dL    HCT 44.2 34.0 - 46.6 %    MCV 92 79 - 97 fL    MCH 30.4 26.6 - 33.0 pg    MCHC 33.3 31.5 - 35.7 g/dL    RDW 13.7 11.7 - 15.4 %    PLATELET 817 279 - 533 x10E3/uL   LIPID PANEL   Result Value Ref Range    Cholesterol, total 253 (H) 100 - 199 mg/dL    Triglyceride 150 (H) 0 - 149 mg/dL    HDL Cholesterol 45 >39 mg/dL    VLDL, calculated 28 5 - 40 mg/dL    LDL, calculated 180 (H) 0 - 99 mg/dL   CVD REPORT   Result Value Ref Range    INTERPRETATION Note          Physical Examination: General appearance - alert, well appearing, and in no distress  Mental status - alert, oriented to person, place, and time, normal mood, behavior, speech, dress, motor activity, and thought processes  Chest - clear to auscultation, no wheezes, rales or rhonchi, symmetric air entry  Heart - normal rate, regular rhythm, normal S1, S2, no murmurs, rubs, clicks or gallops  Musculoskeletal - no joint tenderness, deformity or swelling  Extremities - no pedal edema noted    No lower ext edema, no warmth/redness  Negative grace's sign    Assessment/ Plan:     1. Leg swelling  Low prob for DVT  Advised ED if acute worsening, sob,chest pain    2. Acute pain of right knee  -knee xray    3. Essential hypertension    - METABOLIC PANEL, COMPREHENSIVE  - CBC W/O DIFF  - LIPID PANEL    4. Mixed hyperlipidemia    - METABOLIC PANEL, COMPREHENSIVE  - CBC W/O DIFF  - LIPID PANEL    5. Rheumatoid arthritis, involving unspecified site, unspecified whether rheumatoid factor present ([facility] [address]    - REFERRAL TO RHEUMATOLOGY    6. Attention deficit hyperactivity disorder (ADHD), predominantly inattentive type    - REFERRAL TO PSYCHIATRY    7. Attention deficit hyperactivity disorder (ADHD), combined type   reviewed  - dextroamphetamine-amphetamine (ADDERALL) 20 mg tablet; Take 1 Tab by mouth two (2) times a day. Max Daily Amount: 40 mg. Dispense: 60 Tab; Refill: 0          I have discussed the diagnosis with the patient and the intended plan as seen in the above orders. The patient has received an after-visit summary and questions were answered concerning future plans. Pt conveyed understanding of plan. Medication Side Effects and Warnings were discussed with patient: yes  Patient Labs were reviewed: yes  Patient Past Records were reviewed:  yes    Zeus Don.  William Echavarria NP

## 2021-04-27 RX ORDER — PRAVASTATIN SODIUM 10 MG/1
10 TABLET ORAL
Qty: 90 TAB | Refills: 0 | Status: SHIPPED | OUTPATIENT
Start: 2021-04-27 | End: 2021-12-06

## 2021-05-04 DIAGNOSIS — I10 ESSENTIAL HYPERTENSION WITH GOAL BLOOD PRESSURE LESS THAN 140/90: ICD-10-CM

## 2021-05-04 RX ORDER — AMLODIPINE BESYLATE 10 MG/1
TABLET ORAL
Qty: 30 TAB | Refills: 1 | Status: SHIPPED | OUTPATIENT
Start: 2021-05-04 | End: 2021-07-08

## 2021-05-17 ENCOUNTER — HOSPITAL ENCOUNTER (OUTPATIENT)
Dept: CT IMAGING | Age: 61
Discharge: HOME | End: 2021-05-17
Attending: INTERNAL MEDICINE
Payer: MEDICAID

## 2021-05-17 DIAGNOSIS — R91.8 MULTIPLE LUNG NODULES: ICD-10-CM

## 2021-05-17 PROCEDURE — 71250 CT THORAX DX C-: CPT

## 2021-06-01 ENCOUNTER — TELEPHONE (OUTPATIENT)
Dept: INTERNAL MEDICINE CLINIC | Age: 61
End: 2021-06-01

## 2021-06-01 NOTE — TELEPHONE ENCOUNTER
Pt wants to know what she can take OTC for a head cold and cough, other symptoms- nasal drainage and post nasal drip.   Pt 3

## 2021-06-01 NOTE — TELEPHONE ENCOUNTER
Pt states she will like to try OTC medication for head cold before having appt. Keshawn Em for HBP, pt understood.  Pt will f/u if there has been no improvement

## 2021-06-09 ENCOUNTER — OFFICE VISIT (OUTPATIENT)
Dept: INTERNAL MEDICINE CLINIC | Age: 61
End: 2021-06-09
Payer: MEDICAID

## 2021-06-09 VITALS
WEIGHT: 190 LBS | OXYGEN SATURATION: 95 % | TEMPERATURE: 98 F | HEART RATE: 83 BPM | BODY MASS INDEX: 28.79 KG/M2 | SYSTOLIC BLOOD PRESSURE: 138 MMHG | DIASTOLIC BLOOD PRESSURE: 83 MMHG | HEIGHT: 68 IN | RESPIRATION RATE: 18 BRPM

## 2021-06-09 DIAGNOSIS — F90.0 ATTENTION DEFICIT HYPERACTIVITY DISORDER (ADHD), PREDOMINANTLY INATTENTIVE TYPE: ICD-10-CM

## 2021-06-09 DIAGNOSIS — E78.2 MIXED HYPERLIPIDEMIA: ICD-10-CM

## 2021-06-09 DIAGNOSIS — I10 ESSENTIAL HYPERTENSION: Primary | ICD-10-CM

## 2021-06-09 DIAGNOSIS — J00 COMMON COLD VIRUS: ICD-10-CM

## 2021-06-09 PROCEDURE — 99213 OFFICE O/P EST LOW 20 MIN: CPT | Performed by: NURSE PRACTITIONER

## 2021-06-09 NOTE — PROGRESS NOTES
Subjective: (As above and below)     Chief Complaint   Patient presents with    Sinus Infection     pt reports cough/cold x 2 weeks, pt believes she now has sinus infection, pt reports light yellow mucous when blowing nose x 3 days along w/ ear pain in both ears and right ear fullness     Sweats     pt states she occ breaks out in drenching head sweat but is not hot or feverish x 2 weeks      Loel LickAlecia King is a 64y.o. year old female who presents for     Hypertension ROS:  taking medications as instructed, no medication side effects noted, no TIAs, no chest pain on exertion, no dyspnea on exertion, no swelling of ankles    Hyperlipidemia: tolerating statin    ADHD: was est w/ psych but due to pandemic has been lost to fu and did not want to do virtual visits  She self stopped wellbutrin and has not had adderall, she wishes to defer refills at this time as she does not want to rely on medication    Cold symptoms: about 2 weeks ago she suffered sinus pressure/ chills, sweats, cough and fatigue  She is feeling better but has some ear pressure and a lot of yellow nasal drainage  No cough, fevers, sob  She is using allergy meds, zyrtec, flonase    She had covid vax      Reviewed PmHx, RxHx, FmHx, SocHx, AllgHx and updated in chart. Family History   Problem Relation Age of Onset    Hypertension Mother     Diabetes Mother     Stroke Mother          ~ 65 yo    Heart Disease Father          ~ 78 yo    Breast Cancer Cousin         mat.  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 No pets in the house. Social Determinants of Health     Financial Resource Strain:     Difficulty of Paying Living Expenses:    Food Insecurity:     Worried About Running Out of Food in the Last Year:     [address] in the Last Year:    Transportation Needs:     Lack of Transportation (Medical):  Lack of Transportation (Non-Medical):    Physical Activity:     Days of Exercise per Week:     Minutes of Exercise per Session:    Stress:     Feeling of Stress :    Social Connections:     Frequency of Communication with Friends and Family:     Frequency of Social Gatherings with Friends and Family:     Attends Congregation Services:     Active Member of Clubs or Organizations:     Attends Club or Organization Meetings:     Marital Status:           Current Outpatient Medications   Medication Sig    amLODIPine (NORVASC) 10 mg tablet TAKE 1 TABLET BY MOUTH EVERY DAY FOR BLOOD PRESSURE    pravastatin (PRAVACHOL) 10 mg tablet Take 1 Tab by mouth nightly.  triamcinolone acetonide (KENALOG) 0.1 % ointment Apply  to affected area two (2) times a day. use thin layer    ibuprofen (MOTRIN) 800 mg tablet TAKE 1 TAB BY MOUTH EVERY EIGHT (8) HOURS AS NEEDED FOR PAIN.  Cetirizine (ZyrTEC) 10 mg cap Take 10 mg by mouth nightly.  albuterol (PROVENTIL HFA, VENTOLIN HFA, PROAIR HFA) 90 mcg/actuation inhaler Take 1 Puff by inhalation every six (6) hours as needed for Wheezing.     montelukast (SINGULAIR) 10 mg tablet TAKE 1 TABLET BY MOUTH EVERY DAY    FLOVENT  mcg/actuation inhaler TAKE 2 PUFFS BY MOUTH TWICE DAILY    fluticasone propionate (FLONASE) 50 mcg/actuation nasal spray USE 2 SPRAYS IN EACH NOSTRIL TWICE A DAY    ascorbic acid, vitamin C, (VITAMIN C) 500 mg tablet Take  by mouth.  cholecalciferol, VITAMIN D3, (VITAMIN D3) 5,000 unit tab tablet Take  by mouth daily.  FOLIC ACID/MULTIVIT-MIN/LUTEIN (CENTRUM SILVER PO) Take  by mouth.  dextroamphetamine-amphetamine (ADDERALL) 20 mg tablet Take 1 Tab by mouth two (2) times a day. Max Daily Amount: 40 mg. (Patient not taking: Reported on 6/9/2021)    cyclobenzaprine (FLEXERIL) 10 mg tablet TAKE 1 TABLET BY MOUTH EVERY NIGHT AS NEEDED FOR MUSCLE SPASMS. (Patient not taking: Reported on 6/9/2021)    buPROPion XL (WELLBUTRIN XL) 150 mg tablet Take 1 Tab by mouth every morning. depression (Patient not taking: Reported on 6/9/2021)    cyanocobalamin 1,000 mcg tablet Take 1,000 mcg by mouth daily. (Patient not taking: Reported on 6/9/2021)     No current facility-administered medications for this visit. Review of Systems:   Constitutional:    Negative for fever and chills, negative diaphoresis. HEENT:              Negative for neck pain and stiffness. Eyes:                  Negative for visual disturbance, itching, redness or discharge. Respiratory:        Negative for cough and shortness of breath. Cardiovascular:  Negative for chest pain and palpitations. Gastrointestinal: Negative for nausea, vomiting, abdominal pain, diarrhea or constipation. Genitourinary:     Negative for dysuria and frequency. Musculoskeletal: Negative for falls, tenderness and swelling. Skin:                    Negative for rash, masses or lesions. Neurological:       Negative for dizzyness, seizure, loss of consciousness, weakness and numbness.      Objective:     Vitals:    06/09/21 0857   BP: 138/83   Pulse: 83   Resp: 18   Temp: 98 °F (36.7 °C)   TempSrc: Temporal   SpO2: 95%   Weight: 190 lb (86.2 kg)   Height: 5' 8\" (1.727 m) Results for orders placed or performed in visit on 78/42/92   METABOLIC PANEL, COMPREHENSIVE   Result Value Ref Range    Glucose 98 65 - 99 mg/dL    BUN 18 8 - 27 mg/dL    Creatinine 0.88 0.57 - 1.00 mg/dL    GFR est non-AA 72 >59 mL/min/1.73    GFR est AA 83 >59 mL/min/1.73    BUN/Creatinine ratio 20 12 - 28    Sodium 142 134 - 144 mmol/L    Potassium 4.4 3.5 - 5.2 mmol/L    Chloride 102 96 - 106 mmol/L    CO2 25 20 - 29 mmol/L    Calcium 10.2 8.7 - 10.3 mg/dL    Protein, total 7.5 6.0 - 8.5 g/dL    Albumin 5.1 (H) 3.8 - 4.9 g/dL    GLOBULIN, TOTAL 2.4 1.5 - 4.5 g/dL    A-G Ratio 2.1 1.2 - 2.2    Bilirubin, total 0.5 0.0 - 1.2 mg/dL    Alk.  phosphatase 153 (H) 39 - 117 IU/L    AST (SGOT) 19 0 - 40 IU/L    ALT (SGPT) 9 0 - 32 IU/L   CBC W/O DIFF   Result Value Ref Range    WBC 3.2 (L) 3.4 - 10.8 x10E3/uL    RBC 4.83 3.77 - 5.28 x10E6/uL    HGB 14.7 11.1 - 15.9 g/dL    HCT 44.2 34.0 - 46.6 %    MCV 92 79 - 97 fL    MCH 30.4 26.6 - 33.0 pg    MCHC 33.3 31.5 - 35.7 g/dL    RDW 13.7 11.7 - 15.4 %    PLATELET 135 622 - 887 x10E3/uL   LIPID PANEL   Result Value Ref Range    Cholesterol, total 253 (H) 100 - 199 mg/dL    Triglyceride 150 (H) 0 - 149 mg/dL    HDL Cholesterol 45 >39 mg/dL    VLDL, calculated 28 5 - 40 mg/dL    LDL, calculated 180 (H) 0 - 99 mg/dL   CVD REPORT   Result Value Ref Range    INTERPRETATION Note          Physical Examination: General appearance - alert, well appearing, and in no distress  Mental status - alert, oriented to person, place, and time, normal mood, behavior, speech, dress, motor activity, and thought processes  Ears - bilateral TM's and external ear canals normal  Nose - normal and patent, no erythema, discharge or polyps, normal nontender sinuses, mucosal congestion, mucosal erythema and clear rhinorrhea  Mouth - mucous membranes moist, pharynx normal without lesions  Chest - clear to auscultation, no wheezes, rales or rhonchi, symmetric air entry  Heart - normal rate, regular rhythm, normal S1, S2, no murmurs, rubs, clicks or gallops  Extremities - no pedal edema noted      Assessment/ Plan:     1. Essential hypertension  Reprinted previously ordered labs    2. Mixed hyperlipidemia      3. Attention deficit hyperactivity disorder (ADHD), predominantly inattentive type  Fu psych in the fall as scheudled    4. Common cold virus  S/s are improving, cont current plan, fluids, rest   Fu INI          I have discussed the diagnosis with the patient and the intended plan as seen in the above orders. The patient has received an after-visit summary and questions were answered concerning future plans. Pt conveyed understanding of plan. Medication Side Effects and Warnings were discussed with patient: yes  Patient Labs were reviewed: yes  Patient Past Records were reviewed:  yes    Cat Baig.  Jayleen Vizcaino NP

## 2021-06-09 NOTE — PROGRESS NOTES
Chief Complaint   Patient presents with    Sinus Infection     pt reports cough/cold x 2 weeks, pt believes she now has sinus infection, pt reports light yellow mucous when blowing nose x 3 days along w/ ear pain in both ears and right ear fullness     Sweats     pt states she occ breaks out in drenching head sweat but is not hot or feverish x 2 weeks        1. Have you been to the ER, urgent care clinic since your last visit? Hospitalized since your last visit? No    2. Have you seen or consulted any other health care providers outside of the [address] [place] [address] since your last visit? Include any pap smears or colon screening.  No

## 2021-06-10 LAB
25(OH)D3+25(OH)D2 SERPL-MCNC: 25.9 NG/ML (ref 30–100)
GGT SERPL-CCNC: 55 IU/L (ref 0–60)
TSH SERPL DL<=0.005 MIU/L-ACNC: 1.08 UIU/ML (ref 0.45–4.5)

## 2021-06-11 ENCOUNTER — TELEPHONE (OUTPATIENT)
Dept: INTERNAL MEDICINE CLINIC | Age: 61
End: 2021-06-11

## 2021-06-11 RX ORDER — DOXYCYCLINE 100 MG/1
100 CAPSULE ORAL 2 TIMES DAILY
Qty: 14 CAPSULE | Refills: 0 | Status: SHIPPED | OUTPATIENT
Start: 2021-06-11 | End: 2021-06-18

## 2021-06-11 NOTE — TELEPHONE ENCOUNTER
Pt states she does need the antibiotic for the cold and her ears are \"kililng her\". She did not sleep well last night.  She wants a return call please  Pt # [phone]

## 2021-06-17 DIAGNOSIS — R74.8 ELEVATED ALKALINE PHOSPHATASE LEVEL: Primary | ICD-10-CM

## 2021-07-07 DIAGNOSIS — I10 ESSENTIAL HYPERTENSION WITH GOAL BLOOD PRESSURE LESS THAN 140/90: ICD-10-CM

## 2021-07-08 RX ORDER — AMLODIPINE BESYLATE 10 MG/1
TABLET ORAL
Qty: 30 TABLET | Refills: 1 | Status: SHIPPED | OUTPATIENT
Start: 2021-07-08 | End: 2021-12-06

## 2021-07-11 RX ORDER — TRIAMCINOLONE ACETONIDE 1 MG/G
OINTMENT TOPICAL 2 TIMES DAILY
Qty: 30 G | Refills: 0 | Status: SHIPPED | OUTPATIENT
Start: 2021-07-11 | End: 2021-12-06

## 2021-08-06 ENCOUNTER — OFFICE VISIT (OUTPATIENT)
Dept: RHEUMATOLOGY | Age: 61
End: 2021-08-06
Payer: MEDICAID

## 2021-08-06 VITALS
HEART RATE: 63 BPM | HEIGHT: 68 IN | BODY MASS INDEX: 29.37 KG/M2 | WEIGHT: 193.8 LBS | TEMPERATURE: 98.2 F | RESPIRATION RATE: 18 BRPM | DIASTOLIC BLOOD PRESSURE: 88 MMHG | OXYGEN SATURATION: 98 % | SYSTOLIC BLOOD PRESSURE: 135 MMHG

## 2021-08-06 DIAGNOSIS — D86.9 SARCOIDOSIS: Primary | ICD-10-CM

## 2021-08-06 DIAGNOSIS — R74.8 ELEVATED ALKALINE PHOSPHATASE LEVEL: ICD-10-CM

## 2021-08-06 DIAGNOSIS — E83.52 HYPERCALCEMIA: ICD-10-CM

## 2021-08-06 DIAGNOSIS — N20.0 NEPHROLITHIASIS: ICD-10-CM

## 2021-08-06 DIAGNOSIS — Z79.899 ONGOING USE OF POSSIBLY TOXIC MEDICATION: ICD-10-CM

## 2021-08-06 DIAGNOSIS — M72.2 PLANTAR FASCIITIS: ICD-10-CM

## 2021-08-06 DIAGNOSIS — M19.90 CHRONIC INFLAMMATORY ARTHRITIS: ICD-10-CM

## 2021-08-06 PROCEDURE — 99205 OFFICE O/P NEW HI 60 MIN: CPT | Performed by: INTERNAL MEDICINE

## 2021-08-06 RX ORDER — DICLOFENAC SODIUM 75 MG/1
75 TABLET, DELAYED RELEASE ORAL
Qty: 60 TABLET | Refills: 3 | Status: SHIPPED | OUTPATIENT
Start: 2021-08-06 | End: 2022-02-18

## 2021-08-06 RX ORDER — LEFLUNOMIDE 20 MG/1
20 TABLET ORAL DAILY
Qty: 30 TABLET | Refills: 3 | Status: SHIPPED | OUTPATIENT
Start: 2021-08-06 | End: 2022-02-17

## 2021-08-06 NOTE — LETTER
8/16/2021    Patient: Dagoberto Em   YOB: 1960   Date of Visit: 8/6/2021     Candance Leyland. Merle Goldberg NP  [address]  [place] [address]  Via Fax: [phone]    Dear Candance Leyland. MAZIN Sanchez MD,      Thank you for referring Ms. Arturo Llanes to [facility] for evaluation. My notes for this consultation are attached. If you have questions, please do not hesitate to call me. I look forward to following your patient along with you.       Sincerely,    Tanmay Sherwood MD  Cell: [phone]

## 2021-08-06 NOTE — PATIENT INSTRUCTIONS
1. Much of your joint pain is likely due to osteoarthritis. Try taking diclofenac 75mg up to twice a day as needed in place of the ibuprofen. If you don't like it as much, then fine to go back to the ibuprofen. 2. You have a few skin spots which I suspect are from sarcoidosis, slightly low white blood count, and some extended stiffness which may reflect early inflammatory arthritis (sarcoidosis and/or rheumatoid arthritis). 3. I'm starting you on leflunomide daily, which is an immunosuppressant-type medication which can help rheumatoid arthritis and sarcoidosis. Let me know if you develop diarrhea, or new numbness/tingling while taking this, and stop it. 3. Labs at your earliest convenience, and 1 month after starting leflunomide. 4. Return in 2 months.

## 2021-08-06 NOTE — PROGRESS NOTES
REASON FOR VISIT:    is a 64 y.o. female with history of rheumatoid arthritis and sarcoidosis who is being referred to [facility] Rheumatology at the request of Dr. Diane Sanders, regarding a diagnosis of joint pain. HISTORY OF PRESENT ILLNESS    In the 1980's, over about 1 month developed polyarticular pain and swelling moving from feet to knees to elbows, treated with gold injections and she thinks methotrexate early on though not in the last decade. Now, getting gradually progressive \"aches and pains. \" Right foot, left foot, under arch of righ t foot, both knees, right > left hand with some dorsal hand swelling, bilateral elbows, low back and hips. Joint pains worsen together. Hands feel tight. Early morning and mid-evening are the worst.     She was having intermittent chest pains around 1993, went to [facility] and identified \"lung spot\", diagnosed with pulmonary sarcoidosis. Has been on recurrent prednisone tapers for lung flares, though last taper was for back pain last November. Now following with Dr. Idania Cueto, uses inhalers. Recently started CPAP for ANITA. Has a mild residual nocturnal-predominant cough she attributes to allergies after discovery of multiple environmental allergies. Has been diagnosed with nephrolithiasis she thinks diagnosed on workup of dysuria roughly 3 years ago. RLE swells intermittently, this has been better recently. Has seen Dr. Qing Rutherford who prescribed 100mg bid Lyrica which has been helpful; had done ELZBIETA's L4-S1. MRI showed moderate L4-5 central stenosis with L4-5 and L5-S1 neural foraminal stenoses. Pain now stays in low back and hips, but with prolonged sitting or standing gets radiation up and down spine. Taking ibuprofen 800mg, which takes the edge off, takes it 2-3x/day now.      Had been referred to Dr. Marjan Rosales in Rheumatology with whom she was  Prescribed Plaquenil (hydroxychloroquine), 200mg bid for about 2 weeks, was having stomach upset and concern for side effects so self-discontinued. REVIEW OF SYSTEMS  A comprehensive review of systems was negative except for that written in the HPI. A 10-point review of systems is per the new patient questionnaire, which has been reviewed extensively and scanned into the electronic medical record for future reference. Review of systems is as above and is otherwise negative. ALLERGIES  Latex, Iodine, Lisinopril, Shellfish derived, and Simvastatin    MEDICATIONS  Current Outpatient Medications   Medication Sig    triamcinolone acetonide (KENALOG) 0.1 % ointment APPLY TO AFFECTED AREA TWO (2) TIMES A DAY. USE THIN LAYER    amLODIPine (NORVASC) 10 mg tablet TAKE 1 TABLET BY MOUTH EVERY DAY FOR BLOOD PRESSURE    pravastatin (PRAVACHOL) 10 mg tablet Take 1 Tab by mouth nightly.  ibuprofen (MOTRIN) 800 mg tablet TAKE 1 TAB BY MOUTH EVERY EIGHT (8) HOURS AS NEEDED FOR PAIN.  Cetirizine (ZyrTEC) 10 mg cap Take 10 mg by mouth nightly.  albuterol (PROVENTIL HFA, VENTOLIN HFA, PROAIR HFA) 90 mcg/actuation inhaler Take 1 Puff by inhalation every six (6) hours as needed for Wheezing.  montelukast (SINGULAIR) 10 mg tablet TAKE 1 TABLET BY MOUTH EVERY DAY    FLOVENT  mcg/actuation inhaler TAKE 2 PUFFS BY MOUTH TWICE DAILY    fluticasone propionate (FLONASE) 50 mcg/actuation nasal spray USE 2 SPRAYS IN EACH NOSTRIL TWICE A DAY    ascorbic acid, vitamin C, (VITAMIN C) 500 mg tablet Take  by mouth.  cholecalciferol, VITAMIN D3, (VITAMIN D3) 5,000 unit tab tablet Take  by mouth daily.  FOLIC ACID/MULTIVIT-MIN/LUTEIN (CENTRUM SILVER PO) Take  by mouth.  dextroamphetamine-amphetamine (ADDERALL) 20 mg tablet Take 1 Tab by mouth two (2) times a day. Max Daily Amount: 40 mg. (Patient not taking: Reported on 6/9/2021)     No current facility-administered medications for this visit.        PAST MEDICAL HISTORY  Past Medical History:   Diagnosis Date    Adult ADHD     Calculus of kidney     Depression     Hypercholesterolemia     Hypertension     Rheumatoid arthritis ([facility] [address]     Sarcoid     Sinus Infection        FAMILY HISTORY  family history includes Breast Cancer in her cousin; Diabetes in her mother; Gout in her father; Heart Disease in her father; Hypertension in her mother; Stroke in her mother. SOCIAL HISTORY  She  reports that she has never smoked. She has never used smokeless tobacco. She reports that she does not drink alcohol and does not use drugs. Social History     Social History Narrative    Living with son (28) and son's father. No pets in the house. DATA  Visit Vitals  /88 (BP 1 Location: Left upper arm, BP Patient Position: Sitting)   Pulse 63   Temp 98.2 °F (36.8 °C) (Oral)   Resp 18   Ht 5' 8\" (1.727 m)   Wt 193 lb 12.8 oz (87.9 kg)   SpO2 98%   BMI 29.47 kg/m²    Body mass index is 29.47 kg/m². No flowsheet data found. General:  The patient is well developed, well nourished, alert, and in no apparent distress. Eyes: Sclera are anicteric. No conjunctival injection. HEENT:  Oropharynx is clear. No oral ulcers. Adequate salivary pooling. No cervical or supraclavicular lymphadenopathy. Lungs:  Clear to auscultation bilaterally, without wheeze or stridor. Normal respiratory effort. Cor:  Regular rate and rhythm. No murmur rub or gallop. Abdomen: Soft, non-tender, without hepatomegaly or masses. Extremities: No calf tenderness, trace B LE edema distal 1/3 legs. Warm and well perfused. Skin:  Faded nodular eruption at distal aspect of left medial upper arm. Annular plaque ~2cm diameter on right dorsal foot. Neuro: Nonfocal, no foot or wrist drop  Musculoskeletal:    A comprehensive musculoskeletal exam was performed for all joints of each upper and lower extremity and assessed for swelling, tenderness and range of motion.  Results are documented as below:  No evidence of synovitis in the small joints of the hands, wrists, shoulders, elbows, hips, knees or ankles. Tenderness of right > left wrist, right MCP3 and PIP3, bilateral CMC tenderness without ashley synovitis. Paralumbar tenderness bilaterally, midline tenderness ~L5 without bony stepoff  Anterior calcaneal tenderness right foot    Labs:  21: Ca 10.2, Cr 0.88, Tbili 0.5, AST 19, ALT 9, AlkP 153; HDL 45, ; WBC 3.2 (no diff), Hgb 14.7, Plt 261        Imagin21: Chest CT: IMPRESSION  Stable bilateral pulmonary nodules, hilar and mediastinal lymphadenopathy compared to May 2019.    20 MR Lspine: Moderate L4-5 central stenosis with mild to moderate left and mild right neural foraminal narrowing. Small inferior disc extrusion at L5-S1 with moderate left and mild right neural foraminal stenosis. 19 DXA:  This patient is osteopenic using the World Health Organization criteria  10 year probability of major osteoporotic fracture: 5.8%  10 year probability of hip fracture: 0.9%        ASSESSMENT AND PLAN  Ms. Emma Ochoa is a 64 y.o. 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 Reviewed with her management of rheumatoid arthritis, and forms of joint involvement in sarcoidosis which can be clinically indistinguishable from RA. She does also have active skin plaques today, and given intolerance of Plaquenil (hydroxychloroquine) would start with leflunomide daily for joints and skin. Reviewed risks of liver toxicity, cytopenias, infections, and approaching 10% risk of neuropathy in patients with sarcoidosis. 1. Sarcoidosis  - VITAMIN D, 1, 25 DIHYDROXY; Future  - CHRONIC HEPATITIS PANEL; Future  - QUANTIFERON-TB PLUS(CLIENT INCUB.); Future  - C REACTIVE PROTEIN, QT; Future  - CBC WITH AUTOMATED DIFF; Future  - METABOLIC PANEL, COMPREHENSIVE; Future  - SED RATE (ESR); Future  - RHEUMATOID FACTOR BY TURB (RDL);  Future  - CYCLIC CITRUL PEPTIDE AB, IGG; Future  - ANTI-NUCLEAR AB BY IFA (RDL); Future  - CBC WITH AUTOMATED DIFF; Future  - METABOLIC PANEL, COMPREHENSIVE; Future  - CBC WITH AUTOMATED DIFF  - METABOLIC PANEL, COMPREHENSIVE  - leflunomide (ARAVA) 20 mg tablet; Take 1 Tablet by mouth daily. Take half tab daily for 7 days and then one tab daily if tolerated  Dispense: 30 Tablet; Refill: 3  - diclofenac EC (VOLTAREN) 75 mg EC tablet; Take 1 Tablet by mouth two (2) times daily as needed for Pain. Do not take ibuprofen on days you take this  Dispense: 60 Tablet; Refill: 3    2. Chronic inflammatory arthritis  - VITAMIN D, 1, 25 DIHYDROXY; Future  - CHRONIC HEPATITIS PANEL; Future  - QUANTIFERON-TB PLUS(CLIENT INCUB.); Future  - C REACTIVE PROTEIN, QT; Future  - CBC WITH AUTOMATED DIFF; Future  - METABOLIC PANEL, COMPREHENSIVE; Future  - SED RATE (ESR); Future  - RHEUMATOID FACTOR BY TURB (RDL); Future  - CYCLIC CITRUL PEPTIDE AB, IGG; Future  - ANTI-NUCLEAR AB BY IFA (RDL); Future  - leflunomide (ARAVA) 20 mg tablet; Take 1 Tablet by mouth daily. Take half tab daily for 7 days and then one tab daily if tolerated  Dispense: 30 Tablet; Refill: 3  - diclofenac EC (VOLTAREN) 75 mg EC tablet; Take 1 Tablet by mouth two (2) times daily as needed for Pain. Do not take ibuprofen on days you take this  Dispense: 60 Tablet; Refill: 3    3. Ongoing use of possibly toxic medication  - CBC WITH AUTOMATED DIFF; Future  - METABOLIC PANEL, COMPREHENSIVE; Future    4. Hypercalcemia  - VITAMIN D, 1, 25 DIHYDROXY; Future    5. Nephrolithiasis  - VITAMIN D, 1, 25 DIHYDROXY; Future    6. Elevated alkaline phosphatase level  - ALKALINE PHOSPHATASE, BONE; Future    7. Plantar fasciitis  - diclofenac EC (VOLTAREN) 75 mg EC tablet; Take 1 Tablet by mouth two (2) times daily as needed for Pain. Do not take ibuprofen on days you take this  Dispense: 60 Tablet; Refill: 3    Patient Instructions   1. Much of your joint pain is likely due to osteoarthritis.  Try taking diclofenac 75mg up to twice a day as needed in place of the ibuprofen. If you don't like it as much, then fine to go back to the ibuprofen. 2. You have a few skin spots which I suspect are from sarcoidosis, slightly low white blood count, and some extended stiffness which may reflect early inflammatory arthritis (sarcoidosis and/or rheumatoid arthritis). 3. I'm starting you on leflunomide daily, which is an immunosuppressant-type medication which can help rheumatoid arthritis and sarcoidosis. Let me know if you develop diarrhea, or new numbness/tingling while taking this, and stop it. 3. Labs at your earliest convenience, and 1 month after starting leflunomide. 4. Return in 2 months. Orders Placed This Encounter    VITAMIN D, 1, 25 DIHYDROXY    CHRONIC HEPATITIS PANEL    QUANTIFERON-TB PLUS(CLIENT INCUB.)    C REACTIVE PROTEIN, QT    CBC WITH AUTOMATED DIFF    METABOLIC PANEL, COMPREHENSIVE    SED RATE (ESR)    RHEUMATOID FACTOR BY TURB (RDL)    CYCLIC CITRUL PEPTIDE AB, IGG    ANTI-NUCLEAR AB BY IFA (RDL)    CBC WITH AUTOMATED DIFF    METABOLIC PANEL, COMPREHENSIVE    ALKALINE PHOSPHATASE, BONE    leflunomide (ARAVA) 20 mg tablet    diclofenac EC (VOLTAREN) 75 mg EC tablet       Medications: I am having Maryan Dewey maintain her cholecalciferol, FOLIC ACID/MULTIVIT-MIN/LUTEIN (CENTRUM SILVER PO), ascorbic acid (vitamin C), Flovent HFA, fluticasone propionate, montelukast, ZyrTEC, albuterol, ibuprofen, dextroamphetamine-amphetamine, pravastatin, amLODIPine, and triamcinolone acetonide. Follow up: Return in about 2 months (around 10/6/2021).     Face to face time: 55 minutes  Note preparation and records review day of service: 20 minutes  Total provider time day of service: 75 minutes    Tanmay Sherwood MD    Adult Rheumatology   [facility] [address], [address]   Phone [phone]  Fax [phone]

## 2021-08-10 DIAGNOSIS — J45.20 MILD INTERMITTENT ASTHMA WITHOUT COMPLICATION: ICD-10-CM

## 2021-08-10 DIAGNOSIS — J30.2 SEASONAL ALLERGIC RHINITIS: ICD-10-CM

## 2021-08-10 DIAGNOSIS — L30.9 ECZEMA, UNSPECIFIED TYPE: ICD-10-CM

## 2021-08-10 RX ORDER — CETIRIZINE HYDROCHLORIDE 10 MG/1
TABLET, FILM COATED ORAL
Qty: 90 TABLET | Refills: 3 | Status: SHIPPED | OUTPATIENT
Start: 2021-08-10 | End: 2022-09-12

## 2021-10-29 ENCOUNTER — TRANSCRIBE ORDER (OUTPATIENT)
Dept: SCHEDULING | Age: 61
End: 2021-10-29

## 2021-10-29 DIAGNOSIS — Z12.31 ENCOUNTER FOR MAMMOGRAM TO ESTABLISH BASELINE MAMMOGRAM: Primary | ICD-10-CM

## 2021-11-05 ENCOUNTER — TRANSCRIBE ORDER (OUTPATIENT)
Dept: SCHEDULING | Age: 61
End: 2021-11-05

## 2021-11-05 DIAGNOSIS — R91.8 MULTIPLE LUNG NODULES ON CT: Primary | ICD-10-CM

## 2021-11-22 ENCOUNTER — HOSPITAL ENCOUNTER (OUTPATIENT)
Dept: MAMMOGRAPHY | Age: 61
Discharge: HOME | End: 2021-11-22
Attending: NURSE PRACTITIONER
Payer: MEDICAID

## 2021-11-22 DIAGNOSIS — Z12.31 ENCOUNTER FOR MAMMOGRAM TO ESTABLISH BASELINE MAMMOGRAM: ICD-10-CM

## 2021-11-22 PROCEDURE — 77067 SCR MAMMO BI INCL CAD: CPT

## 2021-11-30 ENCOUNTER — TRANSCRIBE ORDER (OUTPATIENT)
Dept: SCHEDULING | Age: 61
End: 2021-11-30

## 2021-11-30 DIAGNOSIS — R91.8 MULTIPLE LUNG NODULES ON CT: Primary | ICD-10-CM

## 2021-12-05 DIAGNOSIS — I10 ESSENTIAL HYPERTENSION WITH GOAL BLOOD PRESSURE LESS THAN 140/90: ICD-10-CM

## 2021-12-06 RX ORDER — PRAVASTATIN SODIUM 10 MG/1
TABLET ORAL
Qty: 90 TABLET | Refills: 0 | Status: SHIPPED | OUTPATIENT
Start: 2021-12-06 | End: 2022-09-15

## 2021-12-06 RX ORDER — TRIAMCINOLONE ACETONIDE 1 MG/G
OINTMENT TOPICAL 2 TIMES DAILY
Qty: 30 G | Refills: 0 | Status: SHIPPED | OUTPATIENT
Start: 2021-12-06 | End: 2022-01-09

## 2021-12-06 RX ORDER — AMLODIPINE BESYLATE 10 MG/1
TABLET ORAL
Qty: 30 TABLET | Refills: 1 | Status: SHIPPED | OUTPATIENT
Start: 2021-12-06 | End: 2022-02-13

## 2021-12-08 ENCOUNTER — HOSPITAL ENCOUNTER (OUTPATIENT)
Dept: CT IMAGING | Age: 61
Discharge: HOME | End: 2021-12-08
Attending: INTERNAL MEDICINE
Payer: MEDICAID

## 2021-12-08 DIAGNOSIS — R91.8 MULTIPLE LUNG NODULES ON CT: ICD-10-CM

## 2021-12-08 PROCEDURE — 71250 CT THORAX DX C-: CPT

## 2021-12-29 ENCOUNTER — IMMUNIZATION (OUTPATIENT)
Dept: INTERNAL MEDICINE CLINIC | Age: 61
End: 2021-12-29
Payer: MEDICAID

## 2021-12-29 PROCEDURE — 91306 COVID-19, MODERNA BOOSTER VACCINE 0.25ML DOSE: CPT | Performed by: INTERNAL MEDICINE

## 2021-12-29 PROCEDURE — 0064A COVID-19, MODERNA BOOSTER VACCINE 0.25ML DOSE: CPT | Performed by: INTERNAL MEDICINE

## 2022-01-09 RX ORDER — TRIAMCINOLONE ACETONIDE 1 MG/G
OINTMENT TOPICAL 2 TIMES DAILY
Qty: 30 G | Refills: 0 | Status: SHIPPED | OUTPATIENT
Start: 2022-01-09 | End: 2022-05-23

## 2022-02-13 DIAGNOSIS — M19.90 CHRONIC INFLAMMATORY ARTHRITIS: ICD-10-CM

## 2022-02-13 DIAGNOSIS — M72.2 PLANTAR FASCIITIS: ICD-10-CM

## 2022-02-13 DIAGNOSIS — I10 ESSENTIAL HYPERTENSION WITH GOAL BLOOD PRESSURE LESS THAN 140/90: ICD-10-CM

## 2022-02-13 DIAGNOSIS — D86.9 SARCOIDOSIS: ICD-10-CM

## 2022-02-13 RX ORDER — AMLODIPINE BESYLATE 10 MG/1
TABLET ORAL
Qty: 30 TABLET | Refills: 1 | Status: SHIPPED | OUTPATIENT
Start: 2022-02-13 | End: 2022-05-23

## 2022-02-14 ENCOUNTER — HOSPITAL ENCOUNTER (OUTPATIENT)
Dept: PHYSICAL THERAPY | Age: 62
Discharge: HOME | End: 2022-02-14
Payer: MEDICAID

## 2022-02-14 PROCEDURE — 97161 PT EVAL LOW COMPLEX 20 MIN: CPT | Performed by: PHYSICAL THERAPIST

## 2022-02-14 PROCEDURE — 97110 THERAPEUTIC EXERCISES: CPT | Performed by: PHYSICAL THERAPIST

## 2022-02-14 NOTE — PROGRESS NOTES
[address]    OUTPATIENT PHYSICAL THERAPY    INITIAL EVALUATION    NAME: Brian Boggs AGE: 64 y.o. GENDER: female  DATE: 2022  REFERRING PHYSICIAN: ZOILA Isbell    OBJECTIVE DATA SUMMARY:   Medical Diagnosis: Low back pain (M54.59); Pain in thoracic spine (M54.6)  PT Diagnosis: Other reduced mobility secondary to mid to low back pain  Date of Onset: over a year  Mechanism of Injury/Chief Complaint: No injury  Present Symptoms: Patient presents with aching pain in mid to low back that occasionally radiates into upper back and hips. Patient experiences occasional tingling/numbness down BLE to feet with left LE> right LE. Increased pain with prolonged standing and sitting. She has received epidural steroid injections in . Functional Deficits and Limitations:   [x]     Sitting:   [x]    Dressing:   [x]    Reaching:  [x]     Standin mins  [x]     Bathing:   [x]    Lifting:  [x]     Walking:   [x]     Cooking:   []    Yardwork:  [x]     Sleeping:   [x]     Cleaning:   [x]     Driving:  []     Work Tasks:N/A  []     Recreation:   []    Other:    HISTORY:  Past Medical History:   Past Medical History:   Diagnosis Date    Adult ADHD     Calculus of kidney     Depression     Hypercholesterolemia     Hypertension     Rhe[facility] arthritis ([facility] [address]     Sarcoid     Sinus Infection      Past Surgical History:   Procedure Laterality Date    HX BREAST BIOPSY      ? left breast - benign- no visible scar    HX MOHS PROCEDURES      HX ORTHOPAEDIC      HX PARTIAL HYSTERECTOMY      29-30 yoa    IR INJ SPINE FLUORO GUIDED  10/16/2020     Precautions: None  Current Medications:  Amlodipine; Kenalog; Diclofenac; Pravastatin; Cetirizine; Arava; Albuterol; Singulair;  Flonase; Vitamin C; Vitamin D3  Prior Level of Function/Home Situation: Takes time with ADLs  Personal factors and/or comorbidities impacting plan of care: Chronic back pain; Fibromyalgia  Social/Work History:  Not working  Previous Therapy:  Yes for back pain for 8 visits from Dec 2019 to March 2020    SUBJECTIVE:   \"It's been gradually getting worse the last few months. \"    Patients goals for therapy: relief    OBJECTIVE DATA SUMMARY:   EXAMINATION/PRESENTATION/DECISION MAKING:   Pain:  Location: Mid to low back, radiates to upper back and hips  Quality: aching  Now: 5/10  Best: 5/10  Worst: 10/10  Factors that improve pain: rest    Posture:   Rounded shoulders    Strength:      LEFT  RIGHT  Hip flexion  5/5  5/5  Hip abduction  4/5  5/5  Knee flexion  5/5  5/5  Knee extension  5/5  5/5   Ankle DF  5/5  5/5  Ankle PF  5/5  5/5    Range of Motion:   Lumbar Spine (AROM)  (*Measured 3rd finger from the floor)  Flexion*  5\"; stretch  Extension 50% limited; sharp pain in midline low back   R side bend 25% limited; pain at right side of back and buttocks  L side bend 25% limited; pain at left side of back  R rotation 25% limited; stretch   L rotation 25% limited; pain at right side of back     Joint Mobility:   Tender and hypomobile throughout mid to lower thoracic spine  Tender with CPA to L4-5    Palpation:   Tender to palpation at bilateral QL, TFL, gluteals, and thoracic and lumbar paraspinals    Neurologic Assessment:   Tone: Normal   Sensation: Occasional tingling/numbness BLEs to feet   Reflexes: NT    Special Tests:   (+) SLR    Mobility:   Transitional Movements: Increased time and effort to perform     Gait: Slow pace   Balance:   WNL    Functional Measure:   Oswestry: 23/50    Based on the above components, the patient evaluation is determined to be of the following complexity level: LOW     TREATMENT/INTERVENTION:  Modalities (Rationale): None this date    Therapeutic Exercises: to develop strength, endurance, range of motion, and flexibility  Initial HEP provided 2/14/2022: trunk flexion stretch against wall; supine and seated piriformis stretches; LTR    Exercises in BOLD performed this date:    Standing:   Trunk flexion stretch against wall: 5 reps with 10 sec holds    Seated:   Piriformis stretch: 2 reps with 15 sec holds    Supine:   LTR: 10 reps  Piriformis stretch: 2 reps with 15 sec holds     Manual Therapy: for joint mobilization/manipulations and soft tissue mobilization  None this date    Neuro Re-Education: to improve movement, balance, coordination, kinesthetic sense, posture, and proprioception for sitting or standing balance  None this date    Therapeutic Activities: to improve functional performance, power, or agility  None this date    Ambulation/Gait Training:  None this date    Activity tolerance and post treatment pain report:   Good; 5/10    Education:  [x]     Home exercise program provided. Education was provided to the patient on the following topics: Patient provided with initial HEP and educated on importance of regular performance of exercises in pain free ROM. Access Code [id number]. Patient verbalized understanding of the topics presented. ASSESSMENT:   Kostas Sevilla is a 64 y.o. female who presents with aching pain in mid to low back that occasionally radiates into upper back and hips. Patient experiences occasional tingling/numbness down BLE to feet with left LE> right LE. Increased pain with prolonged standing and sitting. She has received epidural steroid injections in 2020. Patient tolerated clinic exercises well this date. Patient provided with initial HEP and educated on importance of regular performance of exercises in pain free ROM. Physical therapy problems based on objective data include: pain affecting function, decrease ROM, decrease strength, decrease ADL/ functional abilitiies, decrease activity tolerance, decrease flexibility/ joint mobility and decrease transfer abilities . Patient will benefit from skilled intervention to address these impairments. Rehabilitation potential is considered to be Good.   Factors which may influence rehabilitation potential include chronicity of symptoms. PLAN OF CARE:   Recommendations and Planned Interventions Include:  therapeutic activities, therapeutic exercises, manual therapy, neuro re-education, posture/biomechanics, traction, heat/ice, E-stim, home exercise program, TENS and dry needling    Frequency/Duration:  Patient will benefit from physical therapy visits 1-2 times per week over 8 weeks to optimize improvement in these areas. GOALS  Short term goals  Time frame:  4 weeks  1. Patient will be compliant and independent with the initial HEP as evidenced by being able to perform without cuing. 2. Patient will report a 25% improvement in symptoms. 3. Patient report a 25% improvement in sleeping. 4. Patient will have an increased tolerance for standing to allow 45 minutes of the activity before symptoms start. 5. Patient will tolerate 20 minutes of clinic activities before increase of symptoms. Long term goals  Time frame: 8 weeks  1. Patient will report pain level decrease to 0/10 to allow increased ease of movement. 2. Patient will have an improved score on the Oswestry outcome measure by 9 points to demonstrate an increase in functional activity tolerance. 3. Patient will be independent in final individualized HEP. 4. Patient will have an increase in left hip abductor strength to 5/5 to allow performance of household tasks. 5. Patient will return to standing without being limited by symptoms. 6. Patient will sleep 6-8 hours without being interrupted by pain. [x]     Patient has participated in goal setting and agrees to work toward plan of care. [x]     Patient was instructed to call if questions or concerns arise.     Thank you for this referral.  Ko Merchant, PT   Time Calculation: 60 mins  Patient Time in clinic:   Start Time: 1300   Stop Time: 1400    TREATMENT PLAN EFFECTIVE DATES:   2/14/2022 TO 4/18/2022  I have read the above plan of care for Victorinoshobha Yoder and certify the need for skilled physical therapy services.     Physician Signature: ____________________________________________________    Date: _________________________________________________________________

## 2022-02-15 RX ORDER — DICLOFENAC SODIUM 75 MG/1
75 TABLET, DELAYED RELEASE ORAL
Qty: 60 TABLET | Refills: 3 | OUTPATIENT
Start: 2022-02-15

## 2022-02-15 NOTE — TELEPHONE ENCOUNTER
Please let her know she needs to schedule a followup visit if we are going to continue refilling medications. If she doesn't plan to follow with us going forward, she can likely have her PCP prescribe the diclofenac.

## 2022-02-17 ENCOUNTER — OFFICE VISIT (OUTPATIENT)
Dept: RHEUMATOLOGY | Age: 62
End: 2022-02-17
Payer: MEDICAID

## 2022-02-17 VITALS
DIASTOLIC BLOOD PRESSURE: 86 MMHG | BODY MASS INDEX: 29.95 KG/M2 | SYSTOLIC BLOOD PRESSURE: 127 MMHG | HEIGHT: 68 IN | OXYGEN SATURATION: 96 % | RESPIRATION RATE: 20 BRPM | HEART RATE: 71 BPM | TEMPERATURE: 98.4 F | WEIGHT: 197.6 LBS

## 2022-02-17 DIAGNOSIS — M81.8 OTHER OSTEOPOROSIS WITHOUT CURRENT PATHOLOGICAL FRACTURE: ICD-10-CM

## 2022-02-17 DIAGNOSIS — D86.9 SARCOIDOSIS: Primary | ICD-10-CM

## 2022-02-17 DIAGNOSIS — D86.9 SARCOIDOSIS: ICD-10-CM

## 2022-02-17 DIAGNOSIS — M54.2 CERVICALGIA: ICD-10-CM

## 2022-02-17 DIAGNOSIS — E83.52 HYPERCALCEMIA: ICD-10-CM

## 2022-02-17 DIAGNOSIS — M72.2 PLANTAR FASCIITIS: ICD-10-CM

## 2022-02-17 DIAGNOSIS — M19.90 CHRONIC INFLAMMATORY ARTHRITIS: ICD-10-CM

## 2022-02-17 DIAGNOSIS — R74.8 ELEVATED ALKALINE PHOSPHATASE LEVEL: ICD-10-CM

## 2022-02-17 DIAGNOSIS — M62.838 TRAPEZIUS MUSCLE SPASM: ICD-10-CM

## 2022-02-17 DIAGNOSIS — N20.0 NEPHROLITHIASIS: ICD-10-CM

## 2022-02-17 DIAGNOSIS — Z11.59 ENCOUNTER FOR SCREENING FOR VIRAL DISEASE: ICD-10-CM

## 2022-02-17 PROCEDURE — 99215 OFFICE O/P EST HI 40 MIN: CPT | Performed by: INTERNAL MEDICINE

## 2022-02-17 RX ORDER — PREGABALIN 75 MG/1
75 CAPSULE ORAL 2 TIMES DAILY
COMMUNITY
Start: 2022-01-27

## 2022-02-17 NOTE — PROGRESS NOTES
REASON FOR VISIT:    is a 64 y.o. female with history of rheumatoid arthritis and sarcoidosis who is returning after loss to followup. HISTORY OF PRESENT ILLNESS    Doesn't recall every picking up leflunomide    Now taking Lyrica which helps the pain though it's sedating. Diclofenac has been taking the edge off of her joint pain. Restarted PT for the low back which has been helping. Hands are her main joint pain. Wearing arthritis compression gloves which helps with PIP and CMC soreness. Hurts to sit or stand for long. Has undergone CMC injections before but was too uncomfortable, not interested in repeating this. Has been using nasal CPAP and facial mask, prefers the former but causing epistaxis, helps her overall energy and quality of sleep. Due for her ~annual ophthalmology appointment at [facility] (isn't sure of the physician. No eye redness or pain        Disease History:  Initial visit 8/6/21. In the 1980's, over about 1 month developed polyarticular pain and swelling moving from feet to knees to elbows, treated with gold injections and she thinks methotrexate early on though not in the last decade. Now, getting gradually progressive \"aches and pains. \" Right foot, left foot, under arch of righ t foot, both knees, right > left hand with some dorsal hand swelling, bilateral elbows, low back and hips. Joint pains worsen together. Hands feel tight. Early morning and mid-evening are the worst.   She was having intermittent chest pains around 1993, went to [facility] and identified \"lung spot\", diagnosed with pulmonary sarcoidosis. Has been on recurrent prednisone tapers for lung flares, though last taper was for back pain last November. Now following with Dr. Aman Lobo, uses inhalers. Recently started CPAP for ANITA. Has a mild residual nocturnal-predominant cough she attributes to allergies after discovery of multiple environmental allergies.   Has been diagnosed with nephrolithiasis she thinks diagnosed on workup of dysuria roughly 3 years ago. Has seen Dr. Jessica Allan who prescribed 100mg bid Lyrica which has been helpful; had done ELZBIETA's L4-S1. MRI showed moderate L4-5 central stenosis with L4-5 and L5-S1 neural foraminal stenoses. Pain now stays in low back and hips, but with prolonged sitting or standing gets radiation up and down spine. Taking ibuprofen 800mg, which takes the edge off, takes it 2-3x/day now. Had been referred to Dr. Juan J Cherry in Rheumatology with whom she was  Prescribed Plaquenil (hydroxychloroquine), 200mg bid for about 2 weeks, was having stomach upset and concern for side effects so self-discontinued. REVIEW OF SYSTEMS  A comprehensive review of systems was negative except for that written in the HPI. A 10-point review of systems is per the new patient questionnaire, which has been reviewed extensively and scanned into the electronic medical record for future reference. Review of systems is as above and is otherwise negative. ALLERGIES  Latex, Iodine, Lisinopril, Shellfish derived, and Simvastatin    MEDICATIONS  Current Outpatient Medications   Medication Sig    pregabalin (LYRICA) 75 mg capsule Take 75 mg by mouth two (2) times a day.  amLODIPine (NORVASC) 10 mg tablet TAKE 1 TABLET BY MOUTH EVERY DAY FOR BLOOD PRESSURE    triamcinolone acetonide (KENALOG) 0.1 % ointment APPLY TO AFFECTED AREA TWO (2) TIMES A DAY. USE THIN LAYER    Allergy Relief, cetirizine, 10 mg tablet TAKE 1 TABLET BY MOUTH NIGHTLY    leflunomide (ARAVA) 20 mg tablet Take 1 Tablet by mouth daily. Take half tab daily for 7 days and then one tab daily if tolerated    diclofenac EC (VOLTAREN) 75 mg EC tablet Take 1 Tablet by mouth two (2) times daily as needed for Pain. Do not take ibuprofen on days you take this    albuterol (PROVENTIL HFA, VENTOLIN HFA, PROAIR HFA) 90 mcg/actuation inhaler Take 1 Puff by inhalation every six (6) hours as needed for Wheezing.     FLOVENT  mcg/actuation inhaler TAKE 2 PUFFS BY MOUTH TWICE DAILY    fluticasone propionate (FLONASE) 50 mcg/actuation nasal spray USE 2 SPRAYS IN EACH NOSTRIL TWICE A DAY    ascorbic acid, vitamin C, (VITAMIN C) 500 mg tablet Take  by mouth.  cholecalciferol, VITAMIN D3, (VITAMIN D3) 5,000 unit tab tablet Take  by mouth daily.  FOLIC ACID/MULTIVIT-MIN/LUTEIN (CENTRUM SILVER PO) Take  by mouth.  pravastatin (PRAVACHOL) 10 mg tablet TAKE 1 TABLET BY MOUTH EVERY DAY AT NIGHT (Patient not taking: Reported on 2/17/2022)    dextroamphetamine-amphetamine (ADDERALL) 20 mg tablet Take 1 Tab by mouth two (2) times a day. Max Daily Amount: 40 mg. (Patient not taking: Reported on 6/9/2021)    montelukast (SINGULAIR) 10 mg tablet TAKE 1 TABLET BY MOUTH EVERY DAY (Patient not taking: Reported on 2/17/2022)     No current facility-administered medications for this visit. PAST MEDICAL HISTORY  Past Medical History:   Diagnosis Date    Adult ADHD     Calculus of kidney     Depression     Hypercholesterolemia     Hypertension     Rheumatoid arthritis ([facility] [address]     Sarcoid     Sinus Infection        FAMILY HISTORY  family history includes Breast Cancer in her cousin; Diabetes in her mother; Gout in her father; Heart Disease in her father; Hypertension in her mother; Stroke in her mother. SOCIAL HISTORY  She  reports that she has never smoked. She has never used smokeless tobacco. She reports that she does not drink alcohol and does not use drugs. Social History     Social History Narrative    Living with son (28) and son's father. No pets in the house. DATA  Visit Vitals  /86 (BP 1 Location: Right arm, BP Patient Position: Sitting)   Pulse 71   Temp 98.4 °F (36.9 °C) (Oral)   Resp 20   Ht 5' 8\" (1.727 m)   Wt 197 lb 9.6 oz (89.6 kg)   SpO2 96%   BMI 30.04 kg/m²    Body mass index is 30.04 kg/m². No flowsheet data found.     General:  The patient is well developed, well nourished, alert, and in no apparent distress. Eyes: Sclera are anicteric. No conjunctival injection. HEENT:  Oropharynx is clear. No oral ulcers. Adequate salivary pooling. No cervical or supraclavicular lymphadenopathy. Lungs:  Clear to auscultation bilaterally, without wheeze or stridor. Normal respiratory effort. Cor:  Regular rate and rhythm. No murmur rub or gallop. Abdomen: Soft, non-tender, without hepatomegaly or masses. Extremities: No calf tenderness, trace B LE edema distal 1/3 legs. Warm and well perfused. Skin:  Interval resolved nodule on right upper arm, resolved foot plaque. Neuro: Nonfocal, no foot or wrist drop  Musculoskeletal:    A comprehensive musculoskeletal exam was performed for all joints of each upper and lower extremity and assessed for swelling, tenderness and range of motion. Results are documented as below:  No evidence of synovitis in the small joints of the hands, wrists, shoulders, elbows, hips, knees or ankles. Bilateral CMC tenderness without ashley synovitis. Paralumbar tenderness bilaterally, midline tenderness ~L5 without bony stepoff  No ankle synovitis. Labs:  21: TSH WNL; GGT 55 (WNL), Vit D 25.9;   21: Ca 10.2, Cr 0.88, Tbili 0.5, AST 19, ALT 9, AlkP 153; HDL 45, ; WBC 3.2 (no diff), Hgb 14.7, Plt 261      Imagin21 CT chest without:  THYROID: No nodule. MEDIASTINUM: The mildly enlarged mediastinal lymph nodes in the right  paratracheal region, pretracheal region subcarinal region and left prevascular  space are unchanged. Vearl Pippins CANDICE: The mildly enlarged hilar lymph nodes are unchanged. Evaluation is  somewhat difficult due to lack of intravenous contrast material but there has been no change. .  THORACIC AORTA: No aneurysm. MAIN PULMONARY ARTERY: Normal in caliber. TRACHEA/BRONCHI: Patent. ESOPHAGUS: No wall thickening or dilatation. HEART: Normal in size. PLEURA: No effusion or pneumothorax.   LUNGS: The bilateral pulmonary nodules are unchanged in size and appearance dating back to 5/4/2019. Mild nodular thickening in the plane of the major fissures is unchanged as well.     Linear areas of scarring posteriorly in each lower lobe are stable. Scarring adjacent to the thoracic spine osteophytes in the right lower lobe is stable. . INCIDENTALLY IMAGED UPPER ABDOMEN: The left renal cyst is imaged. No further evaluation is necessary. Kennedy Chime BONES: No destructive bone lesion. IMPRESSION  1. The adenopathy is stable dating back to 5/4/2019. Additionally, the pulmonary nodules are stable as well. No new nodules are noted. No nodules have increased in size. 5/17/21: Chest CT: IMPRESSION  Stable bilateral pulmonary nodules, hilar and mediastinal lymphadenopathy compared to May 2019.    9/24/20 MR Lspine: Moderate L4-5 central stenosis with mild to moderate left and mild right neural foraminal narrowing. Small inferior disc extrusion at L5-S1 with moderate left and mild right neural foraminal stenosis. 6/27/19 DXA:  This patient is osteopenic using the World Health Organization criteria  10 year probability of major osteoporotic fracture: 5.8%  10 year probability of hip fracture: 0.9%        ASSESSMENT AND PLAN  Ms. Valentin Avendano is a 64 y.o. female with history of rheumatoid arthritis and sarcoidosis who presents for followup with currently primarily degenerative-character cervicalgia and associated trapezius spasm. Monitoring off of immunosuppression, updating DXA and cervical plain films. 1. Other osteoporosis without current pathological fracture  - DEXA BONE DENSITY STUDY AXIAL; Future    2. Cervicalgia  - XR SHOULDER RT AP/LAT MIN 2 V; Future    3. Trapezius muscle spasm  - XR SHOULDER RT AP/LAT MIN 2 V; Future    4. Sarcoidosis  - XR SPINE CERV W OBL/FLEX/EXT MIN 6 V COMP; Future  - DEXA BONE DENSITY STUDY AXIAL;  Future  - XR SHOULDER RT AP/LAT MIN 2 V; Future  - XR SHOULDER LT AP/LAT MIN 2 V; Future  - C REACTIVE PROTEIN, QT; Future  - CBC WITH AUTOMATED DIFF; Future  - METABOLIC PANEL, COMPREHENSIVE; Future  - SED RATE (ESR); Future  - ANTI-NUCLEAR AB BY IFA (RDL); Future  - RHEUMATOID FACTOR BY TURB (RDL); Future  - CYCLIC CITRUL PEPTIDE AB, IGG; Future  - HEPATITIS B EVALUATION; Future  - VITAMIN D, 1, 25 DIHYDROXY; Future    5. Elevated alkaline phosphatase level  -high-normal GGT, trend AlkPhos    6. Nephrolithiasis  - CBC WITH AUTOMATED DIFF; Future  - METABOLIC PANEL, COMPREHENSIVE; Future  - SED RATE (ESR); Future    7. Hypercalcemia  - VITAMIN D, 1, 25 DIHYDROXY; Future    8. Chronic inflammatory arthritis  - ANTI-NUCLEAR AB BY IFA (RDL); Future  - RHEUMATOID FACTOR BY TURB (RDL); Future  - CYCLIC CITRUL PEPTIDE AB, IGG; Future  - HEPATITIS B EVALUATION; Future  - VITAMIN D, 1, 25 DIHYDROXY; Future    9. Encounter for screening for viral disease  - HEPATITIS B EVALUATION; Future        Patient Instructions   1. I think your sarcoidosis is still in remission, with most of your pain now coming from osteoarthritis. Try applying the Voltaren (diclofenac) gel to the fingers up to 4x/day as needed, and continue diclofenac pills up to twice a day as needed. 2. Update DXA, and while you're there see if they can do the neck and shoulder xrays for you while you're there. Call [facility] to schedule. 3. Keep working with physical therapy for the neck and back. 4. See if they can set you up in the lab on your way out for blood work    5. Return in 5-6 months, call with interim problems.         Orders Placed This Encounter    XR SPINE CERV W OBL/FLEX/EXT MIN 6 V COMP    DEXA BONE DENSITY STUDY AXIAL    XR SHOULDER RT AP/LAT MIN 2 V    XR SHOULDER LT AP/LAT MIN 2 V    C REACTIVE PROTEIN, QT    CBC WITH AUTOMATED DIFF    METABOLIC PANEL, COMPREHENSIVE    SED RATE (ESR)    ANTI-NUCLEAR AB BY IFA (RDL)    RHEUMATOID FACTOR BY TURB (RDL)    CYCLIC CITRUL PEPTIDE AB, IGG    HEPATITIS B EVALUATION    VITAMIN D, 1, 25 DIHYDROXY    LASHON, TITER AND PATTERN    pregabalin (LYRICA) 75 mg capsule       Medications: I am having Maryan Larson maintain her cholecalciferol, FOLIC ACID/MULTIVIT-MIN/LUTEIN (CENTRUM SILVER PO), ascorbic acid (vitamin C), Flovent HFA, fluticasone propionate, montelukast, albuterol, dextroamphetamine-amphetamine, Allergy Relief (cetirizine), pravastatin, triamcinolone acetonide, amLODIPine, and pregabalin. Follow up: Return in about 6 months (around 8/17/2022).     Face to face time: 25 minutes  Note preparation and records review day of service: 20 minutes  Total provider time day of service: 45 minutes    Elia Mcdaniel MD    Adult Rheumatology   [address]   Phone [phone]  Fax [phone]

## 2022-02-17 NOTE — PROGRESS NOTES
Chief Complaint   Patient presents with    Arthritis     hands, wrist, back, knees, ankle     1. Have you been to the ER, urgent care clinic since your last visit? Hospitalized since your last visit? No    2. Have you seen or consulted any other health care providers outside of the [address] [place] [address] since your last visit? Include any pap smears or colon screening.  No

## 2022-02-17 NOTE — PATIENT INSTRUCTIONS
1. I think your sarcoidosis is still in remission, with most of your pain now coming from osteoarthritis. Try applying the Voltaren (diclofenac) gel to the fingers up to 4x/day as needed, and continue diclofenac pills up to twice a day as needed. 2. Update DXA, and while you're there see if they can do the neck and shoulder xrays for you while you're there. Call [facility] to schedule. 3. Keep working with physical therapy for the neck and back. 4. See if they can set you up in the lab on your way out for blood work    5. Return in 5-6 months, call with interim problems.

## 2022-02-17 NOTE — LETTER
2/18/2022    Patient: Angelique Yeager   YOB: 1960   Date of Visit: 2/17/2022     [facility]. Shara Gaitan NP  [address] [facility] [address] [facility]  [place] [place] [address]  Via Fax: [phone]    Dear [facility]. Shara Waters MD,    We recently saw Ms. Martina Guthrie in the [facility] for evaluation. My notes for this consultation are attached. If you have questions, please do not hesitate to call me. I look forward to following your patient along with you.       Sincerely,    Leighann Lawton MD [facility]  Cell: [phone]

## 2022-02-18 RX ORDER — DICLOFENAC SODIUM 75 MG/1
75 TABLET, DELAYED RELEASE ORAL
Qty: 60 TABLET | Refills: 3 | Status: SHIPPED | OUTPATIENT
Start: 2022-02-18 | End: 2022-10-20 | Stop reason: ALTCHOICE

## 2022-02-21 ENCOUNTER — HOSPITAL ENCOUNTER (OUTPATIENT)
Dept: PHYSICAL THERAPY | Age: 62
Discharge: HOME | End: 2022-02-21
Payer: MEDICAID

## 2022-02-21 PROCEDURE — 97140 MANUAL THERAPY 1/> REGIONS: CPT | Performed by: PHYSICAL THERAPIST

## 2022-02-21 PROCEDURE — 97110 THERAPEUTIC EXERCISES: CPT | Performed by: PHYSICAL THERAPIST

## 2022-02-21 NOTE — PROGRESS NOTES
[address]    OUTPATIENT PHYSICAL THERAPY DAILY TREATMENT NOTE  VISIT: 2    NAME: Brian Boggs AGE: 64 y.o. GENDER: female  DATE: 2/21/2022  REFERRING PHYSICIAN: ZOILA Isbell      GOALS  Short term goals  Time frame:  4 weeks  1. Patient will be compliant and independent with the initial HEP as evidenced by being able to perform without cuing. 2. Patient will report a 25% improvement in symptoms. 3. Patient report a 25% improvement in sleeping. 4. Patient will have an increased tolerance for standing to allow 45 minutes of the activity before symptoms start. 5. Patient will tolerate 20 minutes of clinic activities before increase of symptoms.      Long term goals  Time frame: 8 weeks  1. Patient will report pain level decrease to 0/10 to allow increased ease of movement. 2. Patient will have an improved score on the Oswestry outcome measure by 9 points to demonstrate an increase in functional activity tolerance. 3. Patient will be independent in final individualized HEP. 4. Patient will have an increase in left hip abductor strength to 5/5 to allow performance of household tasks. 5. Patient will return to standing without being limited by symptoms. 6. Patient will sleep 6-8 hours without being interrupted by pain. SUBJECTIVE:   \"It's feeling a little better. \"    Pain In: 4-5/10 back     OBJECTIVE DATA SUMMARY:   Objective data from initial evaluation:  EXAMINATION/PRESENTATION/DECISION MAKING:   Pain:  Location: Mid to low back, radiates to upper back and hips  Quality: aching  Now: 5/10  Best: 5/10  Worst: 10/10  Factors that improve pain: rest     Posture:   Rounded shoulders     Strength:                                          LEFT                  RIGHT  Hip flexion                       5/5                     5/5  Hip abduction                 4/5                     5/5  Knee flexion                    5/5 5/5  Knee extension               5/5                     5/5          Ankle DF                         5/5 5/5  Ankle PF                         5/5 5/5     Range of Motion:   Lumbar Spine (AROM)  (*Measured 3rd finger from the floor)  Flexion*                          5\"; stretch  Extension           50% limited; sharp pain in midline low back   R side bend       25% limited; pain at right side of back and buttocks  L side bend        25% limited; pain at left side of back  R rotation           25% limited; stretch   L rotation           25% limited; pain at right side of back      Joint Mobility:   Tender and hypomobile throughout mid to lower thoracic spine  Tender with CPA to L4-5     Palpation:   Tender to palpation at bilateral QL, TFL, gluteals, and thoracic and lumbar paraspinals     Neurologic Assessment:               Tone: Normal               Sensation: Occasional tingling/numbness BLEs to feet               Reflexes: NT     Special Tests:   (+) SLR     Mobility:               Transitional Movements: Increased time and effort to perform                 Gait: Slow pace               Balance:   WNL     Functional Measure:   Oswestry: 23/50     TREATMENT/INTERVENTION:  Modalities (Rationale): None this date     Therapeutic Exercises: to develop strength, endurance, range of motion, and flexibility  Initial HEP provided 2/14/2022: trunk flexion stretch against wall; supine and seated piriformis stretches; LTR     Exercises in BOLD performed this date:     Standing:   Trunk flexion stretch against wall: 5 reps with 10 sec holds     Seated:   Piriformis stretch: 2 reps with 15 sec holds    Right sidelying:   Left open book stretch: 10 reps     Supine:   LTR: 10 reps  Thoracic stretch with blue foam: 30 seconds  Piriformis stretch: 2 reps with 15 sec holds      Manual Therapy: for joint mobilization/manipulations and soft tissue mobilization  T9-L5 PA joint mobs, grade 2     Neuro Re-Education: to improve movement, balance, coordination, kinesthetic sense, posture, and proprioception for sitting or standing balance  None this date     Therapeutic Activities: to improve functional performance, power, or agility  None this date     Ambulation/Gait Training:  None this date     Activity tolerance and post treatment pain report:   Good; 4/10    Education:  Education was provided to the patient on the following topics: continue HEP in pain free ROM. []    No changes were made to the home exercise program.  [x]    The following changes were made to the home exercise program: added sidelying open book stretch and thoracic stretch with blue foam  Patient verbalized understanding of the topics presented. ASSESSMENT:   Patient returns following initial evaluation. Patient presents with mild to moderate low back and hip pain. Majority of pain at left side of low back and left hip. Patient with regular performance of HEP; instructed to perform in pain free ROM to make more tolerable. Patient gets the most relief from the standing trunk flexion stretch against the wall. She tolerated clinic exercises and manual therapy well this date. Patients progression toward goals is as follows:  [x]     Improving appropriately and progressing toward goals  []     Improving slowly and progressing toward goals  []     Not making progress toward goals and plan of care will be adjusted    PLAN OF CARE:   Patient continues to benefit from skilled intervention to address the above impairments. [x]    Continue treatment per established plan of care.   []     Recommend the following changes to the plan of care    Recommendations/Intent for next treatment: left hip mobs and check SIJ     Judy Otto, PT   Time Calculation: 45 mins  Patient Time in clinic:   Start Time: 1400   Stop Time: 1124

## 2022-02-23 ENCOUNTER — HOSPITAL ENCOUNTER (OUTPATIENT)
Dept: PHYSICAL THERAPY | Age: 62
Discharge: HOME | End: 2022-02-23
Payer: MEDICAID

## 2022-02-23 PROCEDURE — 97110 THERAPEUTIC EXERCISES: CPT | Performed by: PHYSICAL THERAPIST

## 2022-02-23 PROCEDURE — 97140 MANUAL THERAPY 1/> REGIONS: CPT | Performed by: PHYSICAL THERAPIST

## 2022-02-23 NOTE — PROGRESS NOTES
[address]    OUTPATIENT PHYSICAL THERAPY DAILY TREATMENT NOTE  VISIT: 3    NAME: Deisi Fisher AGE: 64 y.o. GENDER: female  DATE: 2/23/2022  REFERRING PHYSICIAN: ZOILA Smith      GOALS  Short term goals  Time frame:  4 weeks  1. Patient will be compliant and independent with the initial HEP as evidenced by being able to perform without cuing. 2. Patient will report a 25% improvement in symptoms. 3. Patient report a 25% improvement in sleeping. 4. Patient will have an increased tolerance for standing to allow 45 minutes of the activity before symptoms start. 5. Patient will tolerate 20 minutes of clinic activities before increase of symptoms.      Long term goals  Time frame: 8 weeks  1. Patient will report pain level decrease to 0/10 to allow increased ease of movement. 2. Patient will have an improved score on the Oswestry outcome measure by 9 points to demonstrate an increase in functional activity tolerance. 3. Patient will be independent in final individualized HEP. 4. Patient will have an increase in left hip abductor strength to 5/5 to allow performance of household tasks. 5. Patient will return to standing without being limited by symptoms. 6. Patient will sleep 6-8 hours without being interrupted by pain. SUBJECTIVE:   \"It's mostly my hips today. It almost feels like it pops out of place sometimes. \"    Pain In: 4-5/10 back     OBJECTIVE DATA SUMMARY:   Objective data from initial evaluation:  EXAMINATION/PRESENTATION/DECISION MAKING:   Pain:  Location: Mid to low back, radiates to upper back and hips  Quality: aching  Now: 5/10  Best: 5/10  Worst: 10/10  Factors that improve pain: rest     Posture:   Rounded shoulders     Strength:                                          LEFT                  RIGHT  Hip flexion                       5/5                     5/5  Hip abduction                 4/5 5/5  Knee flexion                    5/5 5/5  Knee extension               5/5 5/5          Ankle DF                         5/5 5/5  Ankle PF                         5/5 5/5     Range of Motion:   Lumbar Spine (AROM)  (*Measured 3rd finger from the floor)  Flexion*               5\"; stretch  Extension           50% limited; sharp pain in midline low back   R side bend       25% limited; pain at right side of back and buttocks  L side bend        25% limited; pain at left side of back  R rotation           25% limited; stretch   L rotation           25% limited; pain at right side of back      Joint Mobility:   Tender and hypomobile throughout mid to lower thoracic spine  Tender with CPA to L4-5     Palpation:   Tender to palpation at bilateral QL, TFL, gluteals, and thoracic and lumbar paraspinals  Tender at B hip flexors (R>L)     Neurologic Assessment:               Tone: Normal               Sensation: Occasional tingling/numbness BLEs to feet               Reflexes: NT     Special Tests:   (+) SLR     Mobility:               Transitional Movements: Increased time and effort to perform                 Gait: Slow pace               Balance:   WNL     Functional Measure:   Oswestry: 23/50     TREATMENT/INTERVENTION:  Modalities (Rationale): None this date     Therapeutic Exercises: to develop strength, endurance, range of motion, and flexibility  Initial HEP provided 2/14/2022: trunk flexion stretch against wall; supine and seated piriformis stretches; LTR     Exercises in BOLD performed this date:     Standing:   Trunk flexion stretch against wall: 5 reps with 10 sec holds  Hip flexion stretch: 5 reps B; 1 rep with 20 sec holds B     Seated:   STM to right hip flexors with lacrosse ball   Piriformis stretch: 2 reps with 15 sec holds    Right sidelying:   Left open book stretch: 10 reps      Supine:   LTR: 10 reps  Thoracic stretch with blue foam: 30 seconds  Piriformis stretch: 2 reps with 15 sec holds      Manual Therapy: for joint mobilization/manipulations and soft tissue mobilization  T9-L5 PA joint mobs, grade 2  Long axis distraction x3  MET hip add/abd x3     Neuro Re-Education: to improve movement, balance, coordination, kinesthetic sense, posture, and proprioception for sitting or standing balance  None this date     Therapeutic Activities: to improve functional performance, power, or agility  None this date     Ambulation/Gait Training:  None this date     Activity tolerance and post treatment pain report:   Good; 4/10    Education:  Education was provided to the patient on the following topics: continue HEP in pain free ROM. [x]    No changes were made to the home exercise program.  []    The following changes were made to the home exercise program  Patient verbalized understanding of the topics presented. ASSESSMENT:   Patient presents with mild to moderate mid to low back and hip pain. Chief complaint is B hip pain today. Patient appears to be related to SIJ dysfunction. She has difficulty with prolonged standing which requires her to shift in place. Increased pain with stair climbing and getting in and out of car. Patient stiff and hypomobile throughout thoracic spine which is likely putting strain on low back and hips. Patient with regular performance of HEP, but patient continues to need cues for pain free ROM only. Patient has also been performing additional exercises that she was not instructed on which is irritating symptoms. Patient with pain relief following long axis distraction, particularly on left side. Right hip flexors are tighter than left. She tolerated clinic exercises and manual therapy well this date. She will benefit from education on core engagement and then progressing core and trunk strengthening to improve pain and overall function.      Patients progression toward goals is as follows:  [x] Improving appropriately and progressing toward goals  []     Improving slowly and progressing toward goals  []     Not making progress toward goals and plan of care will be adjusted    PLAN OF CARE:   Patient continues to benefit from skilled intervention to address the above impairments. [x]    Continue treatment per established plan of care.   []     Recommend the following changes to the plan of care    Recommendations/Intent for next treatment: left hip mobs; TA holds; hip adduction with ball and abduction with TB    Ko Merchant, PT   Time Calculation: 50 mins  Patient Time in clinic:   Start Time: 1310   Stop Time: 1400

## 2022-02-25 LAB
1,25(OH)2D SERPL-MCNC: 74.8 PG/ML (ref 19.9–79.3)
ALBUMIN SERPL-MCNC: 4.4 G/DL (ref 3.8–4.8)
ALBUMIN/GLOB SERPL: 1.4 {RATIO} (ref 1.2–2.2)
ALP SERPL-CCNC: 160 IU/L (ref 44–121)
ALT SERPL-CCNC: 19 IU/L (ref 0–32)
ANA SER QL IF: POSITIVE
ANA SPECKLED TITR SER: ABNORMAL {TITER}
AST SERPL-CCNC: 24 IU/L (ref 0–40)
BASOPHILS # BLD AUTO: 0 X10E3/UL (ref 0–0.2)
BASOPHILS NFR BLD AUTO: 1 %
BILIRUB SERPL-MCNC: 0.5 MG/DL (ref 0–1.2)
BUN SERPL-MCNC: 14 MG/DL (ref 8–27)
BUN/CREAT SERPL: 13 (ref 12–28)
CALCIUM SERPL-MCNC: 9.8 MG/DL (ref 8.7–10.3)
CCP IGA+IGG SERPL IA-ACNC: 7 UNITS (ref 0–19)
CHLORIDE SERPL-SCNC: 103 MMOL/L (ref 96–106)
CO2 SERPL-SCNC: 22 MMOL/L (ref 20–29)
CREAT SERPL-MCNC: 1.04 MG/DL (ref 0.57–1)
CRP SERPL-MCNC: 2 MG/L (ref 0–10)
EOSINOPHIL # BLD AUTO: 0.1 X10E3/UL (ref 0–0.4)
EOSINOPHIL NFR BLD AUTO: 2 %
ERYTHROCYTE [DISTWIDTH] IN BLOOD BY AUTOMATED COUNT: 13 % (ref 11.7–15.4)
ERYTHROCYTE [SEDIMENTATION RATE] IN BLOOD BY WESTERGREN METHOD: 27 MM/HR (ref 0–40)
GLOBULIN SER CALC-MCNC: 3.1 G/DL (ref 1.5–4.5)
GLUCOSE SERPL-MCNC: 83 MG/DL (ref 65–99)
HBV CORE AB SERPL QL IA: NEGATIVE
HBV CORE IGM SERPL QL IA: NEGATIVE
HBV E AB SERPL QL IA: NEGATIVE
HBV E AG SERPL QL IA: NEGATIVE
HBV SURFACE AB SER QL: REACTIVE
HBV SURFACE AG SERPL QL IA: NORMAL
HCT VFR BLD AUTO: 42.8 % (ref 34–46.6)
HGB BLD-MCNC: 14.6 G/DL (ref 11.1–15.9)
IMM GRANULOCYTES # BLD AUTO: 0 X10E3/UL (ref 0–0.1)
IMM GRANULOCYTES NFR BLD AUTO: 0 %
LYMPHOCYTES # BLD AUTO: 1 X10E3/UL (ref 0.7–3.1)
LYMPHOCYTES NFR BLD AUTO: 36 %
MCH RBC QN AUTO: 30.9 PG (ref 26.6–33)
MCHC RBC AUTO-ENTMCNC: 34.1 G/DL (ref 31.5–35.7)
MCV RBC AUTO: 91 FL (ref 79–97)
MONOCYTES # BLD AUTO: 0.3 X10E3/UL (ref 0.1–0.9)
MONOCYTES NFR BLD AUTO: 12 %
NEUTROPHILS # BLD AUTO: 1.3 X10E3/UL (ref 1.4–7)
NEUTROPHILS NFR BLD AUTO: 49 %
NOTE, 018286: ABNORMAL
PLATELET # BLD AUTO: 272 X10E3/UL (ref 150–450)
POTASSIUM SERPL-SCNC: 4.4 MMOL/L (ref 3.5–5.2)
PROT SERPL-MCNC: 7.5 G/DL (ref 6–8.5)
RBC # BLD AUTO: 4.72 X10E6/UL (ref 3.77–5.28)
RHEUMATOID FACT SERPL-ACNC: <14 IU/ML (ref 0–15)
SODIUM SERPL-SCNC: 142 MMOL/L (ref 134–144)
WBC # BLD AUTO: 2.7 X10E3/UL (ref 3.4–10.8)

## 2022-03-01 ENCOUNTER — HOSPITAL ENCOUNTER (OUTPATIENT)
Dept: MAMMOGRAPHY | Age: 62
Discharge: HOME | End: 2022-03-01
Attending: INTERNAL MEDICINE
Payer: MEDICAID

## 2022-03-01 DIAGNOSIS — D86.9 SARCOIDOSIS: ICD-10-CM

## 2022-03-01 DIAGNOSIS — M81.8 OTHER OSTEOPOROSIS WITHOUT CURRENT PATHOLOGICAL FRACTURE: ICD-10-CM

## 2022-03-01 PROCEDURE — 77080 DXA BONE DENSITY AXIAL: CPT

## 2022-03-02 ENCOUNTER — HOSPITAL ENCOUNTER (OUTPATIENT)
Dept: PHYSICAL THERAPY | Age: 62
Discharge: HOME | End: 2022-03-02
Payer: MEDICAID

## 2022-03-02 NOTE — PROGRESS NOTES
[facility]  [address], [address] [facility]    OUTPATIENT PHYSICAL THERAPY      3/2/2022:  Cady Son was not seen on this date for physical therapy for the following reasons:    [x]     Patient called to cancel the visit for the following reasons: schedule conflict  []     Patient missed the visit and did not call to cancel.     Charlie Rogers, PT

## 2022-03-03 ENCOUNTER — HOSPITAL ENCOUNTER (OUTPATIENT)
Dept: PHYSICAL THERAPY | Age: 62
Discharge: HOME | End: 2022-03-03
Payer: MEDICAID

## 2022-03-03 PROCEDURE — 97112 NEUROMUSCULAR REEDUCATION: CPT | Performed by: PHYSICAL THERAPIST

## 2022-03-03 PROCEDURE — 97110 THERAPEUTIC EXERCISES: CPT | Performed by: PHYSICAL THERAPIST

## 2022-03-03 PROCEDURE — 97140 MANUAL THERAPY 1/> REGIONS: CPT | Performed by: PHYSICAL THERAPIST

## 2022-03-03 NOTE — PROGRESS NOTES
[address]    OUTPATIENT PHYSICAL THERAPY DAILY TREATMENT NOTE  VISIT: 4  NAME: Latoya Cramer AGE: 64 y.o. GENDER: female  DATE: 3/3/2022  REFERRING PHYSICIAN: ZOILA Winkler      GOALS  Short term goals  Time frame:  4 weeks  1. Patient will be compliant and independent with the initial HEP as evidenced by being able to perform without cuing. 2. Patient will report a 25% improvement in symptoms. 3. Patient report a 25% improvement in sleeping. 4. Patient will have an increased tolerance for standing to allow 45 minutes of the activity before symptoms start. 5. Patient will tolerate 20 minutes of clinic activities before increase of symptoms.      Long term goals  Time frame: 8 weeks  1. Patient will report pain level decrease to 0/10 to allow increased ease of movement. 2. Patient will have an improved score on the Oswestry outcome measure by 9 points to demonstrate an increase in functional activity tolerance. 3. Patient will be independent in final individualized HEP. 4. Patient will have an increase in left hip abductor strength to 5/5 to allow performance of household tasks. 5. Patient will return to standing without being limited by symptoms. 6. Patient will sleep 6-8 hours without being interrupted by pain. SUBJECTIVE:   \"I had to lay on a hard table for a CT scan on Tuesday so that irritated my back and hips. \"    Pain In: 5/10 back    OBJECTIVE DATA SUMMARY:   Objective data from initial evaluation:  EXAMINATION/PRESENTATION/DECISION MAKING:   Pain:  Location: Mid to low back, radiates to upper back and hips  Quality: aching  Now: 5/10  Best: 5/10  Worst: 10/10  Factors that improve pain: rest     Posture:   Rounded shoulders     Strength:                                          LEFT                  RIGHT  Hip flexion                       5/5                     5/5  Hip abduction                 4/5 5/5  Knee flexion                    5/5 5/5  Knee extension               5/5 5/5          Ankle DF                         5/5 5/5  Ankle PF                         5/5 5/5     Range of Motion:   Lumbar Spine (AROM)  (*Measured 3rd finger from the floor)  Flexion*               5\"; stretch  Extension           50% limited; sharp pain in midline low back   R side bend       25% limited; pain at right side of back and buttocks  L side bend        25% limited; pain at left side of back  R rotation           25% limited; stretch   L rotation           25% limited; pain at right side of back      Joint Mobility:   Tender and hypomobile throughout mid to lower thoracic spine  Tender with CPA to L4-5     Palpation:   Tender to palpation at bilateral QL, TFL, gluteals, and thoracic and lumbar paraspinals  Tender at B hip flexors (R>L)     Neurologic Assessment:               Tone: Normal               Sensation: Occasional tingling/numbness BLEs to feet               Reflexes: NT     Special Tests:   (+) SLR     Mobility:               Transitional Movements: Increased time and effort to perform                 Gait: Slow pace               Balance:   WNL     Functional Measure:   Oswestry: 23/50     TREATMENT/INTERVENTION:  Modalities (Rationale): None this date     Therapeutic Exercises: to develop strength, endurance, range of motion, and flexibility  Initial HEP provided 2/14/2022: trunk flexion stretch against wall; supine and seated piriformis stretches; LTR     Exercises in BOLD performed this date:     Standing:   Trunk flexion stretch against wall: 5 reps with 10 sec holds  Hip flexion stretch: 5 reps B; 1 rep with 20 sec holds B     Seated:   STM to right hip flexors with lacrosse ball   Piriformis stretch: 2 reps with 15 sec holds  Forward flexion over blue physioball x10 each direction    Right sidelying:   Left open book stretch: 10 reps      Supine:   LTR: 10 reps  Hip abduction with red TB: 10 reps   Hip adduction with ball: 10 reps  Thoracic stretch with blue foam: 30 seconds  Piriformis stretch: 2 reps with 15 sec holds      Manual Therapy: for joint mobilization/manipulations and soft tissue mobilization  T9-L5 PA joint mobs, grade 2  Long axis distraction x3  MET hip add/abd x3  STM to B QL and thoracic and lumbar paraspinals in sidelying with lacrosse ball     Neuro Re-Education: to improve movement, balance, coordination, kinesthetic sense, posture, and proprioception for sitting or standing balance  TA holds in supine; cues for coordinating movement with breathing  Anterior pelvic tilt in sitting 2x5 ; cues to first improve posture to understand start of movement and then coordinating movement with breathing      Therapeutic Activities: to improve functional performance, power, or agility  None this date     Ambulation/Gait Training:  None this date     Activity tolerance and post treatment pain report:   Good; 4/10    Education:  Education was provided to the patient on the following topics: continue HEP in pain free ROM. [x]    No changes were made to the home exercise program.  []    The following changes were made to the home exercise program  Patient verbalized understanding of the topics presented. ASSESSMENT:   Patient presents with mild to moderate mid to low back and hip pain. She is tight at B QL and thoracic and lumbar paraspinals. She continues to experience spasms throughout these areas. Her pain continues to be consistent with SIJ dysfunction. She has difficulty with prolonged standing which requires her to shift in place. Increased pain with stair climbing and getting in and out of car. Patient stiff and hypomobile throughout thoracic spine which is likely putting strain on low back and hips. Patient with regular performance of HEP, but patient continues to need cues for pain free ROM only.  She tolerated clinic exercises and manual therapy well this date. She requires moderate to maximal manual cues to facilitate trunk and core to improve posture and muscle coordination. She will benefit from continued education on core engagement and then progressing core and trunk strengthening to improve pain and overall function. Patients progression toward goals is as follows:  [x]     Improving appropriately and progressing toward goals  []     Improving slowly and progressing toward goals  []     Not making progress toward goals and plan of care will be adjusted    PLAN OF CARE:   Patient continues to benefit from skilled intervention to address the above impairments. [x]    Continue treatment per established plan of care.   []     Recommend the following changes to the plan of care    Recommendations/Intent for next treatment: left hip mobs; continued practice with TA fatimah Powell, PT   Time Calculation: 61 mins  Patient Time in clinic:   Start Time: 0301   Stop Time: [id number]

## 2022-03-09 ENCOUNTER — HOSPITAL ENCOUNTER (OUTPATIENT)
Dept: PHYSICAL THERAPY | Age: 62
Discharge: HOME | End: 2022-03-09
Payer: MEDICAID

## 2022-03-09 PROCEDURE — 97110 THERAPEUTIC EXERCISES: CPT | Performed by: PHYSICAL THERAPIST

## 2022-03-09 NOTE — PROGRESS NOTES
[address]    OUTPATIENT PHYSICAL THERAPY DAILY TREATMENT NOTE  VISIT: 5  NAME: Gia Mensah AGE: 64 y.o. GENDER: female  DATE: 3/9/2022  REFERRING PHYSICIAN: ZOILA De Jesus      GOALS  Short term goals  Time frame:  4 weeks  1. Patient will be compliant and independent with the initial HEP as evidenced by being able to perform without cuing. 2. Patient will report a 25% improvement in symptoms. 3. Patient report a 25% improvement in sleeping. 4. Patient will have an increased tolerance for standing to allow 45 minutes of the activity before symptoms start. 5. Patient will tolerate 20 minutes of clinic activities before increase of symptoms.      Long term goals  Time frame: 8 weeks  1. Patient will report pain level decrease to 0/10 to allow increased ease of movement. 2. Patient will have an improved score on the Oswestry outcome measure by 9 points to demonstrate an increase in functional activity tolerance. 3. Patient will be independent in final individualized HEP. 4. Patient will have an increase in left hip abductor strength to 5/5 to allow performance of household tasks. 5. Patient will return to standing without being limited by symptoms. 6. Patient will sleep 6-8 hours without being interrupted by pain. SUBJECTIVE:   \"I feel okay today. \"    Pain In: 0/10 back    OBJECTIVE DATA SUMMARY:   Objective data from initial evaluation:  EXAMINATION/PRESENTATION/DECISION MAKING:   Pain:  Location: Mid to low back, radiates to upper back and hips  Quality: aching  Now: 5/10  Best: 5/10  Worst: 10/10  Factors that improve pain: rest     Posture:   Rounded shoulders     Strength:                                          LEFT                  RIGHT  Hip flexion                       5/5                     5/5  Hip abduction                 4/5                     5/5  Knee flexion                    5/5 5/5  Knee extension               5/5 5/5          Ankle DF                         5/5 5/5  Ankle PF                         5/5 5/5     Range of Motion:   Lumbar Spine (AROM)  (*Measured 3rd finger from the floor)  Flexion*               5\"; stretch  Extension           50% limited; sharp pain in midline low back   R side bend       25% limited; pain at right side of back and buttocks  L side bend        25% limited; pain at left side of back  R rotation           25% limited; stretch   L rotation           25% limited; pain at right side of back      Joint Mobility:   Tender and hypomobile throughout mid to lower thoracic spine  Tender with CPA to L4-5     Palpation:   Tender to palpation at bilateral QL, TFL, gluteals, and thoracic and lumbar paraspinals  Tender at B hip flexors (R>L)     Neurologic Assessment:               Tone: Normal               Sensation: Occasional tingling/numbness BLEs to feet               Reflexes: NT     Special Tests:   (+) SLR     Mobility:               Transitional Movements: Increased time and effort to perform                 Gait: Slow pace               Balance:   WNL     Functional Measure:   Oswestry: 23/50     TREATMENT/INTERVENTION:  Modalities (Rationale): None this date     Therapeutic Exercises: to develop strength, endurance, range of motion, and flexibility  Initial HEP provided 2/14/2022: trunk flexion stretch against wall; supine and seated piriformis stretches; LTR     Exercises in BOLD performed this date:     Standing:   Trunk flexion stretch against wall: 5 reps with 10 sec holds  Hip flexion stretch: 5 reps B; 1 rep with 20 sec holds B     Seated:   STM to right hip flexors with lacrosse ball   Piriformis stretch: 2 reps with 15 sec holds  Forward flexion over blue physioball x10 each direction    Right sidelying:   Left open book stretch: 10 reps      Supine:   Hip abduction with green TB and TA hold: 3 sets of 10 reps   Hip adduction with ball and TA hold: 3 sets of 10 reps  Thoracic stretch with blue foam: 30 seconds  Piriformis stretch: 2 reps with 15 sec holds      Manual Therapy: for joint mobilization/manipulations and soft tissue mobilization  T9-L5 PA joint mobs, grade 2  Long axis distraction x3  MET hip add/abd x3  STM to B QL and thoracic and lumbar paraspinals in sidelying with lacrosse ball     Neuro Re-Education: to improve movement, balance, coordination, kinesthetic sense, posture, and proprioception for sitting or standing balance  TA holds in supine; cues for coordinating movement with breathing  Anterior pelvic tilt in sitting 2x5 ; cues to first improve posture to understand start of movement and then coordinating movement with breathing      Therapeutic Activities: to improve functional performance, power, or agility  None this date     Ambulation/Gait Training:  None this date     Activity tolerance and post treatment pain report:   Good; 0/10    Education:  Education was provided to the patient on the following topics: continue HEP in pain free ROM. []    No changes were made to the home exercise program.  [x]    The following changes were made to the home exercise program: added hip add with pillow and hip abd with band (provided green TB)  Patient verbalized understanding of the topics presented. ASSESSMENT:   Patient presents with no back or hip pain today. She continues to experience back spasms. She has difficulty with prolonged standing which requires her to shift in place. Increased pain with stair climbing and getting in and out of car. Patient with regular performance of HEP, but patient continues to need cues for form and for pain free ROM only. She tolerated clinic exercises well this date with rest breaks provided. She requires moderate to maximal manual cues to facilitate trunk and core to improve posture and muscle coordination.  She will benefit from continued education on core engagement and then progressing core and trunk strengthening to improve pain and overall function. Patients progression toward goals is as follows:  [x]     Improving appropriately and progressing toward goals  []     Improving slowly and progressing toward goals  []     Not making progress toward goals and plan of care will be adjusted    PLAN OF CARE:   Patient continues to benefit from skilled intervention to address the above impairments. [x]    Continue treatment per established plan of care.   []     Recommend the following changes to the plan of care    Recommendations/Intent for next treatment: left hip mobs; continued practice with TA holds     Prudence SONYA Klein   Time Calculation: 30 mins  Patient Time in clinic:   Start Time: 3582   Stop Time: 2890

## 2022-03-16 ENCOUNTER — HOSPITAL ENCOUNTER (OUTPATIENT)
Dept: PHYSICAL THERAPY | Age: 62
Discharge: HOME | End: 2022-03-16
Payer: MEDICAID

## 2022-03-16 PROCEDURE — 97110 THERAPEUTIC EXERCISES: CPT | Performed by: PHYSICAL THERAPIST

## 2022-03-16 NOTE — PROGRESS NOTES
[address]    OUTPATIENT PHYSICAL THERAPY DAILY TREATMENT NOTE  VISIT: 6  NAME: Alexandra De Paz AGE: 64 y.o. GENDER: female  DATE: 3/16/2022  REFERRING PHYSICIAN: ZOILA Garcia      GOALS  Short term goals  Time frame:  4 weeks  1. Patient will be compliant and independent with the initial HEP as evidenced by being able to perform without cuing. 2. Patient will report a 25% improvement in symptoms. 3. Patient report a 25% improvement in sleeping. 4. Patient will have an increased tolerance for standing to allow 45 minutes of the activity before symptoms start. 5. Patient will tolerate 20 minutes of clinic activities before increase of symptoms.      Long term goals  Time frame: 8 weeks  1. Patient will report pain level decrease to 0/10 to allow increased ease of movement. 2. Patient will have an improved score on the Oswestry outcome measure by 9 points to demonstrate an increase in functional activity tolerance. 3. Patient will be independent in final individualized HEP. 4. Patient will have an increase in left hip abductor strength to 5/5 to allow performance of household tasks. 5. Patient will return to standing without being limited by symptoms. 6. Patient will sleep 6-8 hours without being interrupted by pain. SUBJECTIVE:   \"I feel okay today. \"    Pain In: 0/10 back    OBJECTIVE DATA SUMMARY:   Objective data from initial evaluation:  EXAMINATION/PRESENTATION/DECISION MAKING:   Pain:  Location: Mid to low back, radiates to upper back and hips  Quality: aching  Now: 5/10  Best: 5/10  Worst: 10/10  Factors that improve pain: rest     Posture:   Rounded shoulders     Strength:                                          LEFT                  RIGHT  Hip flexion                       5/5                     5/5  Hip abduction                 4/5                     5/5  Knee flexion                    5/5 5/5  Knee extension               5/5 5/5          Ankle DF                         5/5 5/5  Ankle PF                         5/5 5/5     Range of Motion:   Lumbar Spine (AROM)  (*Measured 3rd finger from the floor)  Flexion*               5\"; stretch  Extension           50% limited; sharp pain in midline low back   R side bend       25% limited; pain at right side of back and buttocks  L side bend        25% limited; pain at left side of back  R rotation           25% limited; stretch   L rotation           25% limited; pain at right side of back      Joint Mobility:   Tender and hypomobile throughout mid to lower thoracic spine  Tender with CPA to L4-5     Palpation:   Tender to palpation at bilateral QL, TFL, gluteals, and thoracic and lumbar paraspinals  Tender at B hip flexors (R>L)     Neurologic Assessment:               Tone: Normal               Sensation: Occasional tingling/numbness BLEs to feet               Reflexes: NT     Special Tests:   (+) SLR     Mobility:               Transitional Movements: Increased time and effort to perform                 Gait: Slow pace               Balance:   WNL     Functional Measure:   Oswestry: 23/50     TREATMENT/INTERVENTION:  Modalities (Rationale): None this date     Therapeutic Exercises: to develop strength, endurance, range of motion, and flexibility  Initial HEP provided 2/14/2022: trunk flexion stretch against wall; supine and seated piriformis stretches; LTR     Exercises in BOLD performed this date:     Standing:   Trunk flexion stretch against wall: 5 reps with 10 sec holds  Hip flexion stretch: 5 reps B; 1 rep with 20 sec holds B  Unable to tolerate standing rows     Seated:   STM to right hip flexors with lacrosse ball   Piriformis stretch: 2 reps with 15 sec holds  Forward flexion over blue physioball x5 each direction    Right sidelying:   Left open book stretch: 10 reps      Supine:   Hip abduction with green TB and TA hold: 2 sets of 10 reps   Hip adduction with ball and TA hold: 2 sets of 10 reps  Piriformis stretch: 2 reps with 15 sec holds      Manual Therapy: for joint mobilization/manipulations and soft tissue mobilization  T9-L5 PA joint mobs, grade 2  Long axis distraction x3  MET hip add/abd x3  STM to B QL and thoracic and lumbar paraspinals in sidelying with lacrosse ball     Neuro Re-Education: to improve movement, balance, coordination, kinesthetic sense, posture, and proprioception for sitting or standing balance  TA holds in supine; cues for coordinating movement with breathing  Anterior pelvic tilt in sitting 2x5 ; cues to first improve posture to understand start of movement and then coordinating movement with breathing      Therapeutic Activities: to improve functional performance, power, or agility  None this date     Ambulation/Gait Training:  None this date     Activity tolerance and post treatment pain report:   Good; 0/10    Education:  Education was provided to the patient on the following topics: continue HEP in pain free ROM. []    No changes were made to the home exercise program.  [x]    The following changes were made to the home exercise program: patient instructed to only perform hip add with ball (2x10 with 10 sec hold), hip abd with green TB (2x10 with 2 sec hold), trunk flexion over ball (2x5 in 3 directions), and standing thoracic/lumbar spine stretch (x5-10)  Patient verbalized understanding of the topics presented. ASSESSMENT:   Patient presents with no back or hip pain today. Patient with high variability in pain levels between 0/10 and 10/10 depending on the day. She continues to experience back spasms. She has difficulty with prolonged standing which requires her to shift in place. Increased pain with stair climbing and getting in and out of car. Patient again educated on need to avoid painful ROM and excessive reps/set counts that are irritating her pain. She tolerated clinic exercises well with maximal cues for  60 second rest break in between sets and exercises. Patient instructed to set timer on her phone to ensure proper rest breaks. She continues to require cues for form and to perform exercises in a slow and controlled fashion with a hold where appropriate for maximum benefit. Patient continues to have difficulty with coordination during exercises. She will benefit from continued education on core engagement and then progressing core and trunk strengthening to improve pain and overall function. Patients progression toward goals is as follows:  [x]     Improving appropriately and progressing toward goals  []     Improving slowly and progressing toward goals  []     Not making progress toward goals and plan of care will be adjusted    PLAN OF CARE:   Patient continues to benefit from skilled intervention to address the above impairments. [x]    Continue treatment per established plan of care.   []     Recommend the following changes to the plan of care    Recommendations/Intent for next treatment: left hip mobs; continued practice with TA holds     Dylan Voss, SONYA   Time Calculation: 45 mins  Patient Time in clinic:   Start Time: 1402   Stop Time: [phone]

## 2022-03-18 PROBLEM — M85.80 OSTEOPENIA: Status: ACTIVE | Noted: 2019-07-01

## 2022-03-19 PROBLEM — Z90.710 H/O: HYSTERECTOMY: Status: ACTIVE | Noted: 2019-12-10

## 2022-03-19 PROBLEM — E66.3 OVERWEIGHT (BMI 25.0-29.9): Status: ACTIVE | Noted: 2017-01-26

## 2022-03-19 PROBLEM — F33.41 RECURRENT MAJOR DEPRESSIVE DISORDER, IN PARTIAL REMISSION (HCC): Status: ACTIVE | Noted: 2017-11-28

## 2022-03-19 PROBLEM — L30.9 ECZEMA: Status: ACTIVE | Noted: 2019-03-11

## 2022-03-20 PROBLEM — R74.8 ELEVATED ALKALINE PHOSPHATASE LEVEL: Status: ACTIVE | Noted: 2017-08-02

## 2022-03-30 ENCOUNTER — HOSPITAL ENCOUNTER (OUTPATIENT)
Dept: PHYSICAL THERAPY | Age: 62
Discharge: HOME | End: 2022-03-30
Payer: MEDICAID

## 2022-03-30 PROCEDURE — 97110 THERAPEUTIC EXERCISES: CPT | Performed by: PHYSICAL THERAPIST

## 2022-03-30 NOTE — PROGRESS NOTES
[address]    OUTPATIENT PHYSICAL THERAPY DAILY TREATMENT NOTE  VISIT: 7  NAME: Sarwat Rose AGE: 64 y.o. GENDER: female  DATE: 3/30/2022  REFERRING PHYSICIAN: ZOILA Rangel    GOALS  Short term goals  Time frame:  4 weeks  1. Patient will be compliant and independent with the initial HEP as evidenced by being able to perform without cuing. 2. Patient will report a 25% improvement in symptoms. 3. Patient report a 25% improvement in sleeping. 4. Patient will have an increased tolerance for standing to allow 45 minutes of the activity before symptoms start. 5. Patient will tolerate 20 minutes of clinic activities before increase of symptoms.      Long term goals  Time frame: 8 weeks  1. Patient will report pain level decrease to 0/10 to allow increased ease of movement. 2. Patient will have an improved score on the Oswestry outcome measure by 9 points to demonstrate an increase in functional activity tolerance. 3. Patient will be independent in final individualized HEP. 4. Patient will have an increase in left hip abductor strength to 5/5 to allow performance of household tasks. 5. Patient will return to standing without being limited by symptoms. 6. Patient will sleep 6-8 hours without being interrupted by pain. SUBJECTIVE:   \"I feel okay today. \"    Pain In: 0/10 back    OBJECTIVE DATA SUMMARY:   Objective data from initial evaluation:  EXAMINATION/PRESENTATION/DECISION MAKING:   Pain:  Location: Mid to low back, radiates to upper back and hips  Quality: aching  Now: 5/10  Best: 5/10  Worst: 10/10  Factors that improve pain: rest     Posture:   Rounded shoulders     Strength:                                          LEFT                  RIGHT  Hip flexion                       5/5                     5/5  Hip abduction                 4/5                     5/5  Knee flexion                    5/5 5/5  Knee extension               5/5 5/5          Ankle DF                         5/5 5/5  Ankle PF                         5/5 5/5     Range of Motion:   Lumbar Spine (AROM)  (*Measured 3rd finger from the floor)  Flexion*               5\"; stretch  Extension           50% limited; sharp pain in midline low back   R side bend       25% limited; pain at right side of back and buttocks  L side bend        25% limited; pain at left side of back  R rotation           25% limited; stretch   L rotation           25% limited; pain at right side of back      Joint Mobility:   Tender and hypomobile throughout mid to lower thoracic spine  Tender with CPA to L4-5     Palpation:   Tender to palpation at bilateral QL, TFL, gluteals, and thoracic and lumbar paraspinals  Tender at B hip flexors (R>L)     Neurologic Assessment:               Tone: Normal               Sensation: Occasional tingling/numbness BLEs to feet               Reflexes: NT     Special Tests:   (+) SLR     Mobility:               Transitional Movements: Increased time and effort to perform                 Gait: Slow pace               Balance:   WNL     Functional Measure:   Oswestry: 23/50     TREATMENT/INTERVENTION:  Modalities (Rationale): None this date     Therapeutic Exercises: to develop strength, endurance, range of motion, and flexibility  Initial HEP provided 2/14/2022: trunk flexion stretch against wall; supine and seated piriformis stretches; LTR  Added to HEP 3/30/22: multifidus walkouts with green TB; side steps with green TB     Exercises in BOLD performed this date:     Standing:   Trunk flexion stretch against wall: 5 reps with 10 sec holds  Hip flexion stretch: 5 reps B; 1 rep with 20 sec holds B  Multifidus walkouts with green TB: 3 sets of 4 reps B   Side steps with green TB: 2 sets of 10 steps B   Unable to tolerate standing rows     Seated:   STM to right hip flexors with lacrosse ball   Piriformis stretch: 2 reps with 15 sec holds  Forward flexion over blue physioball x5 each direction    Right sidelying:   Left open book stretch: 10 reps      Supine:   Hip abduction with green TB and TA hold: 2 sets of 10 reps   Hip adduction with ball and TA hold: 2 sets of 10 reps  Piriformis stretch: 2 reps with 15 sec holds      Manual Therapy: for joint mobilization/manipulations and soft tissue mobilization  T9-L5 PA joint mobs, grade 2  Long axis distraction x3  MET hip add/abd x3  STM to B QL and thoracic and lumbar paraspinals in sidelying with lacrosse ball     Neuro Re-Education: to improve movement, balance, coordination, kinesthetic sense, posture, and proprioception for sitting or standing balance  TA holds in supine; cues for coordinating movement with breathing  Anterior pelvic tilt in sitting 2x5 ; cues to first improve posture to understand start of movement and then coordinating movement with breathing      Therapeutic Activities: to improve functional performance, power, or agility  None this date     Ambulation/Gait Training:  None this date     Activity tolerance and post treatment pain report:   Good; 0/10    Education:  Education was provided to the patient on the following topics: continue HEP in pain free ROM. []    No changes were made to the home exercise program.  [x]    The following changes were made to the home exercise program: added to HEP 3/30/22: multifidus walkouts with TB; side steps with TB   HEP: patient instructed to only perform hip add with ball (2x10 with 10 sec hold), hip abd with green TB (2x10 with 2 sec hold), trunk flexion over ball (2x5 in 3 directions), and standing thoracic/lumbar spine stretch (x5-10)  Patient verbalized understanding of the topics presented. ASSESSMENT:   Patient presents with no back or hip pain today.  Patient experienced one episode of increased pain after performing exercises followed immediately by deep cleaning of her house. Patient did better with knowing her limit with exercises and not overdoing reps/sets. She continues to have difficulty with prolonged standing. Patient is looking forward to warmer weather and walking more for exercise. Patient tolerated clinic exercises well with cues for form. Patient continues to have difficulty with muscle coordination but better able to engage core throughout session. She will benefit from core, hip, and trunk strengthening to improve pain and overall function. Patients progression toward goals is as follows:  [x]     Improving appropriately and progressing toward goals  []     Improving slowly and progressing toward goals  []     Not making progress toward goals and plan of care will be adjusted    PLAN OF CARE:   Patient continues to benefit from skilled intervention to address the above impairments. [x]    Continue treatment per established plan of care.   []     Recommend the following changes to the plan of care    Recommendations/Intent for next treatment: left hip mobs; continued practice with KASEY Rosa PT   Time Calculation: 35 mins  Patient Time in clinic:   Start Time: 1035   Stop Time: 1110

## 2022-04-06 ENCOUNTER — HOSPITAL ENCOUNTER (OUTPATIENT)
Dept: PHYSICAL THERAPY | Age: 62
Discharge: HOME | End: 2022-04-06
Payer: MEDICAID

## 2022-04-06 PROCEDURE — 97110 THERAPEUTIC EXERCISES: CPT | Performed by: PHYSICAL THERAPIST

## 2022-04-06 NOTE — PROGRESS NOTES
[address]    OUTPATIENT PHYSICAL THERAPY DAILY TREATMENT NOTE  VISIT: 8  NAME: Celestino Garcia AGE: 64 y.o. GENDER: female  DATE: 4/6/2022  REFERRING PHYSICIAN: ZOILA Avendano    GOALS  Short term goals  Time frame:  4 weeks  1. Patient will be compliant and independent with the initial HEP as evidenced by being able to perform without cuing. 2. Patient will report a 25% improvement in symptoms. 3. Patient report a 25% improvement in sleeping. 4. Patient will have an increased tolerance for standing to allow 45 minutes of the activity before symptoms start. 5. Patient will tolerate 20 minutes of clinic activities before increase of symptoms.      Long term goals  Time frame: 8 weeks  1. Patient will report pain level decrease to 0/10 to allow increased ease of movement. 2. Patient will have an improved score on the Oswestry outcome measure by 9 points to demonstrate an increase in functional activity tolerance. 3. Patient will be independent in final individualized HEP. 4. Patient will have an increase in left hip abductor strength to 5/5 to allow performance of household tasks. 5. Patient will return to standing without being limited by symptoms. 6. Patient will sleep 6-8 hours without being interrupted by pain. SUBJECTIVE:   \"I felt pretty good over the last week. Some good days and some bad days. \"    Pain In: 0/10 back    OBJECTIVE DATA SUMMARY:   Objective data from initial evaluation:  EXAMINATION/PRESENTATION/DECISION MAKING:   Pain:  Location: Mid to low back, radiates to upper back and hips  Quality: aching  Now: 5/10  Best: 5/10  Worst: 10/10  Factors that improve pain: rest     Posture:   Rounded shoulders     Strength:                                          LEFT                  RIGHT  Hip flexion                       5/5                     5/5  Hip abduction                 4/5                     5/5  Knee flexion                    5/5                     5/5  Knee extension               5/5                     5/5          Ankle DF                         5/5 5/5  Ankle PF                         5/5 5/5     Range of Motion:   Lumbar Spine (AROM)  (*Measured 3rd finger from the floor)  Flexion*               5\"; stretch  Extension           50% limited; sharp pain in midline low back   R side bend       25% limited; pain at right side of back and buttocks  L side bend        25% limited; pain at left side of back  R rotation           25% limited; stretch   L rotation           25% limited; pain at right side of back      Joint Mobility:   Tender and hypomobile throughout mid to lower thoracic spine  Tender with CPA to L4-5     Palpation:   Tender to palpation at bilateral QL, TFL, gluteals, and thoracic and lumbar paraspinals  Tender at B hip flexors (R>L)     Neurologic Assessment:               Tone: Normal               Sensation: Occasional tingling/numbness BLEs to feet               Reflexes: NT     Special Tests:   (+) SLR     Mobility:               Transitional Movements: Increased time and effort to perform                 Gait: Slow pace               Balance:   WNL     Functional Measure:   Oswestry: 23/50     TREATMENT/INTERVENTION:  Modalities (Rationale): None this date     Therapeutic Exercises: to develop strength, endurance, range of motion, and flexibility  Initial HEP provided 2/14/2022: trunk flexion stretch against wall; supine and seated piriformis stretches; LTR  Added to HEP 3/30/22: multifidus walkouts with green TB; side steps with green TB     Exercises in BOLD performed this date:     Standing:   Trunk flexion stretch against wall: 7 reps with 10 sec holds  Hip flexion stretch: 5 reps B; 1 rep with 20 sec holds B  Multifidus walkouts with green TB: 5 sets of 4 reps B   Side steps with green TB: 3 sets of 10 steps B   Unable to tolerate standing rows     Seated:   STM to right hip flexors with lacrosse ball   Piriformis stretch: 2 reps with 15 sec holds  Forward flexion over blue physioball x5 each direction    Right sidelying:   Left open book stretch: 10 reps      Supine:   Hip abduction with green TB and TA hold: 2 sets of 10 reps   Hip adduction with ball and TA hold: 2 sets of 10 reps  Piriformis stretch: 2 reps with 15 sec holds      Manual Therapy: for joint mobilization/manipulations and soft tissue mobilization  T9-L5 PA joint mobs, grade 2  Long axis distraction x3  MET hip add/abd x3  STM to B QL and thoracic and lumbar paraspinals in sidelying with lacrosse ball     Neuro Re-Education: to improve movement, balance, coordination, kinesthetic sense, posture, and proprioception for sitting or standing balance  TA holds in supine; cues for coordinating movement with breathing  Anterior pelvic tilt in sitting 2x5 ; cues to first improve posture to understand start of movement and then coordinating movement with breathing     Activity tolerance and post treatment pain report:   Good; 0/10    Education:  Education was provided to the patient on the following topics: continue HEP in pain free ROM. [x]    No changes were made to the home exercise program.  []    The following changes were made to the home exercise program  Patient verbalized understanding of the topics presented. ASSESSMENT:   Patient presents with no back or hip pain today. Patient experiences pain after increased activity, but reports that symptoms are less intense than on evaluation. Patient does better with taking breaks and not pushing into painful motion. She continues to have difficulty with prolonged standing. Patient with regular performance of HEP and has started walking for exercise. Patient tolerated clinic exercises well with rest breaks provided. Patient with improved ability to coordinate TA hold with mobility and exercises when she focuses on task.  Plan for discharge next session. Patients progression toward goals is as follows:  [x]     Improving appropriately and progressing toward goals  []     Improving slowly and progressing toward goals  []     Not making progress toward goals and plan of care will be adjusted    PLAN OF CARE:   Patient continues to benefit from skilled intervention to address the above impairments. [x]    Continue treatment per established plan of care.   []     Recommend the following changes to the plan of care    Recommendations/Intent for next treatment: discharge next session    Noe Mahoney, PT   Time Calculation: 40 mins  Patient Time in clinic:   Start Time: 1400   Stop Time: 1440

## 2022-04-13 ENCOUNTER — HOSPITAL ENCOUNTER (OUTPATIENT)
Dept: PHYSICAL THERAPY | Age: 62
Discharge: HOME | End: 2022-04-13
Payer: MEDICAID

## 2022-04-13 PROCEDURE — 97110 THERAPEUTIC EXERCISES: CPT | Performed by: PHYSICAL THERAPIST

## 2022-04-13 NOTE — PROGRESS NOTES
[facility]    OUTPATIENT PHYSICAL THERAPY DAILY TREATMENT NOTE WITH DISCHARGE  VISIT: 9  NAME: Mehnaz Payne AGE: 64 y.o. GENDER: female  DATE: 4/13/2022  REFERRING PHYSICIAN: ZOILA Lawler    GOALS  Short term goals  Time frame:  4 weeks  1. Patient will be compliant and independent with the initial HEP as evidenced by being able to perform without cuing. MET  2. Patient will report a 25% improvement in symptoms. MET  3. Patient report a 25% improvement in sleeping. MET  4. Patient will have an increased tolerance for standing to allow 45 minutes of the activity before symptoms start. MET  5. Patient will tolerate 20 minutes of clinic activities before increase of symptoms. MET     Long term goals  Time frame: 8 weeks  1. Patient will report pain level decrease to 0/10 to allow increased ease of movement. MET  2. Patient will have an improved score on the Oswestry outcome measure by 9 points to demonstrate an increase in functional activity tolerance. MET  3. Patient will be independent in final individualized HEP. MET  4. Patient will have an increase in left hip abductor strength to 5/5 to allow performance of household tasks. MET  5. Patient will return to standing without being limited by symptoms. Partially MET  6. Patient will sleep 6-8 hours without being interrupted by pain. MET     SUBJECTIVE:   \"I've been working on the exercises. I felt pretty good this last week and I know when to take breaks now. \"    Pain In: 0/10 back    OBJECTIVE DATA SUMMARY:     EXAMINATION/PRESENTATION/DECISION MAKING:   Pain:  Location: Mid to low back, radiates to upper back and hips  Quality: aching  Now: 0/10  Best: 0/10  Worst: 5/10  Factors that improve pain: rest, exercises     Posture:   Rounded shoulders     Strength:                                          LEFT                  RIGHT  Hip flexion                       5/5 5/5  Hip abduction                 5/5 5/5  Knee flexion                    5/5 5/5  Knee extension               5/5 5/5          Ankle DF                         5/5 5/5  Ankle PF                         5/5 5/5     Range of Motion:   Lumbar Spine (AROM)  (*Measured 3rd finger from the floor)  Flexion*               5\"; stretch  Extension           25% limited; discomfort   R side bend       25% limited; stretch/pull  L side bend        25% limited; stretch/pull  R rotation           25% limited; stretch   L rotation           25% limited; stretch      Joint Mobility:   Mild tenderness and hypomobility throughout mid to lower thoracic spine; most tender to CPA of L4-5     Palpation:   Tender to palpation at bilateral QL, hip flexors, gluteals, and thoracic and lumbar paraspinals     Neurologic Assessment:               Tone: Normal               Sensation: Intact to light touch               Reflexes: NT     Special Tests:   (-) SLR     Mobility:               Transitional Movements: Increased time and effort to perform                 Gait: Slow pace               Balance:   WNL     Functional Measure:   Oswestry: 23/50 on evaluation  Oswestry: 15/50 on discharge     TREATMENT/INTERVENTION:  Modalities (Rationale): None this date     Therapeutic Exercises: to develop strength, endurance, range of motion, and flexibility  Initial HEP provided 2/14/2022: trunk flexion stretch against wall; supine and seated piriformis stretches; LTR  Added to HEP 3/30/22: multifidus walkouts with green TB; side steps with green TB     Exercises in BOLD performed this date:     Standing:   Trunk flexion stretch against wall: 7 reps with 10 sec holds  Hip flexion stretch: 5 reps B; 1 rep with 20 sec holds B  Multifidus walkouts with green TB: 5 sets of 4 reps B   Side steps with green TB: 3 sets of 10 steps B      Seated:   STM to right hip flexors with lacrosse ball   Piriformis stretch: 2 reps with 15 sec holds  Forward flexion over blue physioball x5 each direction      Supine:   Hip abduction with green TB and TA hold: 2 sets of 10 reps   Hip adduction with ball and TA hold: 2 sets of 10 reps  Piriformis stretch: 2 reps with 15 sec holds      Manual Therapy: for joint mobilization/manipulations and soft tissue mobilization  T9-L5 PA joint mobs, grade 2  Long axis distraction x3  MET hip add/abd x3  STM to B QL and thoracic and lumbar paraspinals in sidelying with lacrosse ball     Neuro Re-Education: to improve movement, balance, coordination, kinesthetic sense, posture, and proprioception for sitting or standing balance  TA holds in supine; cues for coordinating movement with breathing  Anterior pelvic tilt in sitting 2x5 ; cues to first improve posture to understand start of movement and then coordinating movement with breathing     Activity tolerance and post treatment pain report:   Good; 0/10    Education:  Education was provided to the patient on the following topics: continue HEP in pain free ROM. []    No changes were made to the home exercise program.  [x]    The following changes were made to the home exercise program: Reviewed comprehensive HEP and instructed to continue exercises in pain free ROM  Patient verbalized understanding of the topics presented. ASSESSMENT:   Patient discharged from physical therapy after 9 visits from 2/14/22 to 4/13/22. Patient presents with no back or hip pain today. Patient reports that she is better able to engage her core with activity and knows when to take breaks in order to not aggravate pain. Back pain increases to a 5/10 at its worst, but she has experienced mild to no pain this past week. Patient with regular performance of HEP and has started walking for exercise. Patient tolerated clinic exercises well with rest breaks provided.  Patient with overall improved strength, coordination, and activity tolerance since starting therapy. Patient with significant improvement in Oswestry outcome measure from 23/50 on evaluation to 15/50 on discharge. Reviewed comprehensive HEP and instructed to continue exercises in pain free ROM and call with questions as needed. Patient in agreement with discharge at this time. PLAN OF CARE:   Patient will be discharged from physical therapy at this time. Criteria for termination of care:  [x]   Goals Achieved  []   Plateau Reached  []   Patient has not returned  []   Other  Plan for follow up, continuing care, or equipment recommendations: Patient provided with comprehensive HEP and instructed to continue exercises in pain free ROM and call with questions as needed.    Thank you for this referral.    Yung Bennett, PT   Time Calculation: 32 mins  Patient Time in clinic:   Start Time: 1330   Stop Time: [phone]

## 2022-05-21 DIAGNOSIS — I10 ESSENTIAL HYPERTENSION WITH GOAL BLOOD PRESSURE LESS THAN 140/90: ICD-10-CM

## 2022-05-23 RX ORDER — TRIAMCINOLONE ACETONIDE 1 MG/G
OINTMENT TOPICAL 2 TIMES DAILY
Qty: 30 G | Refills: 0 | Status: SHIPPED | OUTPATIENT
Start: 2022-05-23 | End: 2022-09-12

## 2022-05-23 RX ORDER — AMLODIPINE BESYLATE 10 MG/1
TABLET ORAL
Qty: 30 TABLET | Refills: 1 | Status: SHIPPED | OUTPATIENT
Start: 2022-05-23 | End: 2022-07-28

## 2022-06-27 ENCOUNTER — TRANSCRIBE ORDER (OUTPATIENT)
Dept: SCHEDULING | Age: 62
End: 2022-06-27

## 2022-06-27 DIAGNOSIS — R91.8 MULTIPLE LUNG NODULES ON CT: Primary | ICD-10-CM

## 2022-07-11 ENCOUNTER — TELEPHONE (OUTPATIENT)
Dept: INTERNAL MEDICINE CLINIC | Age: 62
End: 2022-07-11

## 2022-07-11 NOTE — TELEPHONE ENCOUNTER
----- Message from Donal Liao sent at 7/11/2022  2:16 PM EDT -----  Subject: Referral Request    Reason for referral request? COMPLETE CBC   Provider patient wants to be referred to(if known):     Provider Phone Number(if known): Additional Information for Provider? Pt states that she would like to have   some blood work orders to check her cholesterol and white blood count.  Pt   also states that she would also like to be checked for anemia  ---------------------------------------------------------------------------  --------------  Ky Fraction INFO    [phone]; OK to leave message on voicemail  ---------------------------------------------------------------------------  --------------

## 2022-07-11 NOTE — TELEPHONE ENCOUNTER
Informed pt provider will be out until Wednesday, pt verbalized understanding. Pt states Pulm dr abdullahi she gets labs to check for anemia bc pt reports that she is starting to be tired a lot.      Pt would also like to inform provider of upcoming imaging and appts:     7/12/22- CT Scan ordered by Dr. María Perales  7/26/22- Sleep test Dr. Ricardo Terrell  8/17/22- Rheum appt   8/18/22- Eye Exam, check to see if sarcoid is causing blurred vision

## 2022-07-12 ENCOUNTER — HOSPITAL ENCOUNTER (OUTPATIENT)
Dept: CT IMAGING | Age: 62
Discharge: HOME | End: 2022-07-12
Attending: INTERNAL MEDICINE
Payer: MEDICARE

## 2022-07-12 DIAGNOSIS — R91.8 MULTIPLE LUNG NODULES ON CT: ICD-10-CM

## 2022-07-12 PROCEDURE — 71250 CT THORAX DX C-: CPT

## 2022-07-13 DIAGNOSIS — R53.83 FATIGUE, UNSPECIFIED TYPE: Primary | ICD-10-CM

## 2022-07-13 DIAGNOSIS — D50.9 IRON DEFICIENCY ANEMIA, UNSPECIFIED IRON DEFICIENCY ANEMIA TYPE: ICD-10-CM

## 2022-08-09 ENCOUNTER — TRANSCRIBE ORDER (OUTPATIENT)
Dept: SCHEDULING | Age: 62
End: 2022-08-09

## 2022-08-09 DIAGNOSIS — D86.9 SARCOIDOSIS: Primary | ICD-10-CM

## 2022-08-16 NOTE — PROGRESS NOTES
REASON FOR VISIT:    is a 58 y.o. female with history of rheumatoid arthritis and sarcoidosis who is returning after loss to followup. HISTORY OF PRESENT ILLNESS    Pt returns for a follow up. Pt is not currently taking any immunosuppressing medications. The only medications that she takes now is Diclofenac for her back pain. She also takes Lyrica. Pt reports that her main joint complaints in the past 2 weeks have been her ankles and the top of her feet. She says that she experiences some swelling in her ankles that worsens throughout the day. Her ankle pain is the worst at night and her L ankle is worse than her R. She notes that she usually wears shoes with good support. She also says that she has some stiffness and pain in her hands. She says that it takes her about an hour to loosen up in the morning. Pt denies any new rashes other than some faint pruritic rashes she notices since she started to garden and is allergic to grass. Pt saw her pulmonologist about the results of her July chest CT which had demonstrated right > left basilar scarring stable to slightly increased with stable bilateral pulmonary nodules and anterior mediastinal lymph node prominence. She says that her pulmonologist attributes the worsening in her lower lungs to her weight. She is planned for Pulmonary followup in 6 months. Pt reports that she feels like she is getting winded more. She says that sometimes she gets out of breath going up 16 steps in her house. She said that she has no pain or trouble when taking deep breaths. She uses Flovent, albuterol, and one other type of inhaler. She feels better when she uses her inhalers. She says that she sometimes has GERD when she uses the inhalers. She does not take any medications for her acid reflux currently. Disease History:  Initial visit 8/6/21.   In the 1980's, over about 1 month developed polyarticular pain and swelling moving from feet to knees to elbows, treated with gold injections and she thinks methotrexate early on though not in the last decade. Now, getting gradually progressive \"aches and pains. \" Right foot, left foot, under arch of righ t foot, both knees, right > left hand with some dorsal hand swelling, bilateral elbows, low back and hips. Joint pains worsen together. Hands feel tight. Early morning and mid-evening are the worst.   She was having intermittent chest pains around 1993, went to [facility] and identified \"lung spot\", diagnosed with pulmonary sarcoidosis. Has been on recurrent prednisone tapers for lung flares, though last taper was for back pain last November. Now following with Dr. Janice Traylor, uses inhalers. Recently started CPAP for ANITA. Has a mild residual nocturnal-predominant cough she attributes to allergies after discovery of multiple environmental allergies. Has been diagnosed with nephrolithiasis she thinks diagnosed on workup of dysuria roughly 3 years ago. Has seen Dr. Payton Ayala who prescribed 100mg bid Lyrica which has been helpful; had done ELZBIETA's L4-S1. MRI showed moderate L4-5 central stenosis with L4-5 and L5-S1 neural foraminal stenoses. Pain now stays in low back and hips, but with prolonged sitting or standing gets radiation up and down spine. Taking ibuprofen 800mg, which takes the edge off, takes it 2-3x/day now. Had been referred to Dr. Vee Gaona in Rheumatology with whom she was prescribed Plaquenil (hydroxychloroquine), 200mg bid for about 2 weeks, was having stomach upset and concern for side effects so self-discontinued. REVIEW OF SYSTEMS  A comprehensive review of systems was negative except for that written in the HPI. A 10-point review of systems is per the new patient questionnaire, which has been reviewed extensively and scanned into the electronic medical record for future reference. Review of systems is as above and is otherwise negative.     ALLERGIES  Latex, Iodine, Lisinopril, Shellfish derived, and Simvastatin    MEDICATIONS  Current Outpatient Medications   Medication Sig    amLODIPine (NORVASC) 10 mg tablet TAKE 1 TABLET BY MOUTH EVERY DAY FOR BLOOD PRESSURE    triamcinolone acetonide (KENALOG) 0.1 % ointment APPLY TO AFFECTED AREA TWO (2) TIMES A DAY. USE THIN LAYER    diclofenac EC (VOLTAREN) 75 mg EC tablet TAKE 1 TABLET BY MOUTH TWO (2) TIMES DAILY AS NEEDED FOR PAIN. DO NOT TAKE IBUPROFEN ON DAYS YOU TAKE THIS    pregabalin (LYRICA) 75 mg capsule Take 75 mg by mouth two (2) times a day. pravastatin (PRAVACHOL) 10 mg tablet TAKE 1 TABLET BY MOUTH EVERY DAY AT NIGHT (Patient not taking: Reported on 2/17/2022)    Allergy Relief, cetirizine, 10 mg tablet TAKE 1 TABLET BY MOUTH NIGHTLY    dextroamphetamine-amphetamine (ADDERALL) 20 mg tablet Take 1 Tab by mouth two (2) times a day. Max Daily Amount: 40 mg. (Patient not taking: Reported on 6/9/2021)    albuterol (PROVENTIL HFA, VENTOLIN HFA, PROAIR HFA) 90 mcg/actuation inhaler Take 1 Puff by inhalation every six (6) hours as needed for Wheezing.    montelukast (SINGULAIR) 10 mg tablet TAKE 1 TABLET BY MOUTH EVERY DAY (Patient not taking: Reported on 2/17/2022)    FLOVENT  mcg/actuation inhaler TAKE 2 PUFFS BY MOUTH TWICE DAILY    fluticasone propionate (FLONASE) 50 mcg/actuation nasal spray USE 2 SPRAYS IN EACH NOSTRIL TWICE A DAY    ascorbic acid, vitamin C, (VITAMIN C) 500 mg tablet Take  by mouth. cholecalciferol, VITAMIN D3, (VITAMIN D3) 5,000 unit tab tablet Take  by mouth daily. FOLIC ACID/MULTIVIT-MIN/LUTEIN (CENTRUM SILVER PO) Take  by mouth. No current facility-administered medications for this visit.        PAST MEDICAL HISTORY  Past Medical History:   Diagnosis Date    Adult ADHD     Calculus of kidney     Depression     Hypercholesterolemia     Hypertension     Rheumatoid arthritis ([address]     Sarcoid     Sinus Infection        FAMILY HISTORY  family history includes Breast Cancer in her cousin; Diabetes in her mother; Gout in her father; Heart Disease in her father; Hypertension in her mother; Stroke in her mother. SOCIAL HISTORY  She  reports that she has never smoked. She has never used smokeless tobacco. She reports that she does not drink alcohol and does not use drugs. Social History     Social History Narrative    Living with son (28) and son's father. No pets in the house. 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 DATA  Visit Vitals  /83 (BP 1 Location: Left upper arm, BP Patient Position: Sitting)   Pulse 70   Temp 98.2 °F (36.8 °C) (Oral)   Resp 20   Ht 5' 8\" (1.727 m)   Wt 199 lb 12.8 oz (90.6 kg)   SpO2 96%   BMI 30.38 kg/m²      Body mass index is 30.38 kg/m². No flowsheet data found. General:  The patient is well developed, well nourished, alert, and in no apparent distress. Eyes: Sclera are anicteric. No conjunctival injection. HEENT:  Oropharynx is clear. No oral ulcers. Adequate salivary pooling. No cervical or supraclavicular lymphadenopathy. Lungs:  Clear to auscultation bilaterally, without wheeze or stridor. Normal respiratory effort. Cor:  Regular rate and rhythm. No murmur rub or gallop. Abdomen: Soft, non-tender, without hepatomegaly or masses. Extremities: No calf tenderness, trace B LE edema distal 1/3 legs. Warm and well perfused. Skin:  Interval resolved nodule on right upper arm, resolved foot plaque. Neuro: Nonfocal, no foot or wrist drop  Musculoskeletal:    A comprehensive musculoskeletal exam was performed for all joints of each upper and lower extremity and assessed for swelling, tenderness and range of motion. Results are documented as below:  No evidence of synovitis in the small joints of the hands, wrists, shoulders, elbows, hips, knees or ankles. Bilateral CMC tenderness without ashley synovitis. Paralumbar tenderness bilaterally, midline tenderness ~L5 without bony stepoff  No ankle synovitis.   Bilateral ATFL tenderness, with right >> left MTP squeeze tenderness. Labs:  2/21/22: LASHON speckled pattern 1:80, Calcitrol 74.8, Hep B evaluation neg, CCP Ab IgG/IgA 7, Rheum factor <14, LASHON by IFA (RDL) pos, ESR 27, Cr 1.04, Alk phos 160, AST 24, ALT 19, Tbili 0.5, WBC 2.7, HGB 14.6, Plt 272, CRP 2 mg/L  6/9/21: TSH WNL; GGT 55 (WNL), Vit D 25.9;   4/22/21: Ca 10.2, Cr 0.88, Tbili 0.5, AST 19, ALT 9, AlkP 153; HDL 45, ; WBC 3.2 (no diff), Hgb 14.7, Plt 261      Imaging:  CT CHEST WO CONT (7/12/22): 1. The mediastinal adenopathy is stable. The pulmonary nodules are stable. No new pulmonary nodules are noted. DEXA BONE DENSITY (3/1/22): Femoral Neck Left:  Bone mineral density (gm/cm2):  0.753 g/cm?  % of peak bone mass:  73%  % for age matched controls:  71%  T-score:  -2.1  Z-score:  -2.2     Femoral Neck Right:  Bone mineral density (gm/cm2):  0.700 g/cm?  % of peak bone mass:  67%  % for age matched controls:  66%  T-score:  -2.4  Z-score:  -2.6     Total Hip Left:  Bone mineral density (gm/cm2):  0.783 g/cm?  % of peak bone mass:  78%  % for age matched controls:  73%  T-score:  -1.8  Z-score:  -2.3     Total Hip Right:  Bone mineral density (gm/cm2):  0.718 g/cm?  % of peak bone mass:  71%  % for age matched controls:  67%  T-score:  -2.3  Z-score:  -2.8     Lumbar Spine:  L1-4 or specify  Bone mineral density (gm/cm2):  1.074 g/cm?  % of peak bone mass:  90%  % for age matched controls:  88%  T-score:  -1.0  Z-score:  -1.2    12/8/21 CT chest without:  THYROID: No nodule. MEDIASTINUM: The mildly enlarged mediastinal lymph nodes in the right  paratracheal region, pretracheal region subcarinal region and left prevascular  space are unchanged. [place] Mahoney CANDICE: The mildly enlarged hilar lymph nodes are unchanged. Evaluation is  somewhat difficult due to lack of intravenous contrast material but there has been no change. .  THORACIC AORTA: No aneurysm. MAIN PULMONARY ARTERY: Normal in caliber. TRACHEA/BRONCHI: Patent.   ESOPHAGUS: No wall thickening or dilatation. HEART: Normal in size. PLEURA: No effusion or pneumothorax. LUNGS: The bilateral pulmonary nodules are unchanged in size and appearance dating back to 5/4/2019. Mild nodular thickening in the plane of the major fissures is unchanged as well. Linear areas of scarring posteriorly in each lower lobe are stable. Scarring adjacent to the thoracic spine osteophytes in the right lower lobe is stable. . INCIDENTALLY IMAGED UPPER ABDOMEN: The left renal cyst is imaged. No further evaluation is necessary. Andre Salter BONES: No destructive bone lesion. IMPRESSION  1. The adenopathy is stable dating back to 5/4/2019. Additionally, the pulmonary nodules are stable as well. No new nodules are noted. No nodules have increased in size. 5/17/21: Chest CT: IMPRESSION  Stable bilateral pulmonary nodules, hilar and mediastinal lymphadenopathy compared to May 2019.    9/24/20 MR Lspine: Moderate L4-5 central stenosis with mild to moderate left and mild right neural foraminal narrowing. Small inferior disc extrusion at L5-S1 with moderate left and mild right neural foraminal stenosis. 6/27/19 DXA:  This patient is osteopenic using the World Health Organization criteria  10 year probability of major osteoporotic fracture: 5.8%  10 year probability of hip fracture: 0.9%        ASSESSMENT AND PLAN  Ms. Lucy Liu is a 58 y.o. female with history of rheumatoid arthritis and sarcoidosis who presents for followup with increasing inflammatory-character arthralgia particularly in the feet, for which am trialing a course of leflunomide for seronegative inflammatory arthritis. 1. Chronic inflammatory arthritis  - leflunomide (ARAVA) 20 mg tablet; Take 1 Tablet by mouth daily. Take half tab daily for 7 days and then one tab daily if tolerated  Dispense: 30 Tablet; Refill: 2  - HEPATIC FUNCTION PANEL;  Future  - QUANTIFERON-TB GOLD PLUS; Future  - HEPATIC FUNCTION PANEL  - QUANTIFERON-TB GOLD PLUS  - C REACTIVE PROTEIN, QT; Future  - CBC WITH AUTOMATED DIFF; Future  - METABOLIC PANEL, COMPREHENSIVE; Future  - SED RATE (ESR); Future    2. Sarcoidosis  - leflunomide (ARAVA) 20 mg tablet; Take 1 Tablet by mouth daily. Take half tab daily for 7 days and then one tab daily if tolerated  Dispense: 30 Tablet; Refill: 2  - HEPATIC FUNCTION PANEL; Future  - QUANTIFERON-TB GOLD PLUS; Future  - HEPATIC FUNCTION PANEL  - QUANTIFERON-TB GOLD PLUS  - C REACTIVE PROTEIN, QT; Future  - CBC WITH AUTOMATED DIFF; Future  - METABOLIC PANEL, COMPREHENSIVE; Future  - SED RATE (ESR); Future  - PFT COMPLETE; Future    3. Immunosuppression ([address]  - HEPATIC FUNCTION PANEL; Future  - QUANTIFERON-TB GOLD PLUS; Future  - C REACTIVE PROTEIN, QT; Future  - CBC WITH AUTOMATED DIFF; Future  - METABOLIC PANEL, COMPREHENSIVE; Future  - SED RATE (ESR); Future  - PFT COMPLETE; Future       Patient Instructions   1) I am prescribing Leflunomide. This is a once per day pill. Let me know if you are not tolerating this medication. 2) Continue to work on weight loss. 3) Check labs at your earliest convenience and then one month after you start Leflunomide. 4) Follow up in 2 months. Let me know if you have any questions or concerns in the meantime. Orders Placed This Encounter    QUANTIFERON-TB GOLD PLUS    QUANTIFERON-TB GOLD PLUS    HEPATIC FUNCTION PANEL    C REACTIVE PROTEIN, QT    CBC WITH AUTOMATED DIFF    METABOLIC PANEL, COMPREHENSIVE    SED RATE (ESR)    HEPATIC FUNCTION PANEL    PFT COMPLETE    leflunomide (ARAVA) 20 mg tablet         Medications: I am having Carson Waite start on leflunomide. I am also having her maintain her cholecalciferol, FOLIC ACID/MULTIVIT-MIN/LUTEIN (CENTRUM SILVER PO), ascorbic acid (vitamin C), Flovent HFA, fluticasone propionate, montelukast, albuterol, dextroamphetamine-amphetamine, pravastatin, pregabalin, and diclofenac EC. Follow up: Return in about 2 months (around 10/17/2022).     Face to face time: 25 minutes  Note preparation and records review day of service: 22 minutes  Total provider time day of service: 47 minutes    This was scribed by Yari Jacques in the presence of Dr. Kierra Lieberman MD    Adult Rheumatology   [address]  [facility], [address] [facility]   Phone [phone]  Fax [phone]    --ADDENDUM---  Labs as below largely normal, but notable for positive QFN screen. We have no prior one on file. Very low suspicion for active disease, no recorded history of latent TB though we have reached out to MsAlecia Sheldon Turk to corroborate. OK to continue leflunomide daily for now, encouraged her to reach out to Dr. Hien Simental re: role for latent TB treatment or need for more extensive workup.

## 2022-08-17 ENCOUNTER — OFFICE VISIT (OUTPATIENT)
Dept: RHEUMATOLOGY | Age: 62
End: 2022-08-17
Payer: MEDICARE

## 2022-08-17 VITALS
BODY MASS INDEX: 30.28 KG/M2 | HEART RATE: 70 BPM | SYSTOLIC BLOOD PRESSURE: 128 MMHG | WEIGHT: 199.8 LBS | DIASTOLIC BLOOD PRESSURE: 83 MMHG | RESPIRATION RATE: 20 BRPM | OXYGEN SATURATION: 96 % | TEMPERATURE: 98.2 F | HEIGHT: 68 IN

## 2022-08-17 DIAGNOSIS — M19.90 CHRONIC INFLAMMATORY ARTHRITIS: Primary | ICD-10-CM

## 2022-08-17 DIAGNOSIS — D84.9 IMMUNOSUPPRESSION (HCC): ICD-10-CM

## 2022-08-17 DIAGNOSIS — D86.9 SARCOIDOSIS: ICD-10-CM

## 2022-08-17 PROCEDURE — G8752 SYS BP LESS 140: HCPCS | Performed by: INTERNAL MEDICINE

## 2022-08-17 PROCEDURE — 99215 OFFICE O/P EST HI 40 MIN: CPT | Performed by: INTERNAL MEDICINE

## 2022-08-17 PROCEDURE — G8417 CALC BMI ABV UP PARAM F/U: HCPCS | Performed by: INTERNAL MEDICINE

## 2022-08-17 PROCEDURE — G9717 DOC PT DX DEP/BP F/U NT REQ: HCPCS | Performed by: INTERNAL MEDICINE

## 2022-08-17 PROCEDURE — G9899 SCRN MAM PERF RSLTS DOC: HCPCS | Performed by: INTERNAL MEDICINE

## 2022-08-17 PROCEDURE — 3017F COLORECTAL CA SCREEN DOC REV: CPT | Performed by: INTERNAL MEDICINE

## 2022-08-17 PROCEDURE — G8427 DOCREV CUR MEDS BY ELIG CLIN: HCPCS | Performed by: INTERNAL MEDICINE

## 2022-08-17 PROCEDURE — G8754 DIAS BP LESS 90: HCPCS | Performed by: INTERNAL MEDICINE

## 2022-08-17 RX ORDER — LEFLUNOMIDE 20 MG/1
20 TABLET ORAL DAILY
Qty: 30 TABLET | Refills: 2 | Status: SHIPPED | OUTPATIENT
Start: 2022-08-17 | End: 2022-09-27

## 2022-08-17 NOTE — PATIENT INSTRUCTIONS
1) I am prescribing Leflunomide. This is a once per day pill. Let me know if you are not tolerating this medication. 2) Continue to work on weight loss. 3) Check labs at your earliest convenience and then one month after you start Leflunomide. 4) Follow up in 2 months. Let me know if you have any questions or concerns in the meantime.

## 2022-08-17 NOTE — PROGRESS NOTES
Chief Complaint   Patient presents with    Sarcoidosis    Joint Pain     Ankle, foot     1. Have you been to the ER, urgent care clinic since your last visit? Hospitalized since your last visit? No    2. Have you seen or consulted any other health care providers outside of the [address] [place] [address] since your last visit? Include any pap smears or colon screening.  Yes Where: Dr. Leyla Mclean, pulmonary

## 2022-08-17 NOTE — LETTER
9/15/2022    Patient: Yamileth Keen   YOB: 1960   Date of Visit: 8/17/2022     Vitaly Noonan. Paz Baldwin NP  [address]  [address]  [phone]  Via Fax: [phone]    Dear Vitaly Noonan. Paz Coleman MD,    We recently saw Ms. Noris Goldman in the [facility] for evaluation. My notes for this consultation are attached. Notably, her after-visit QFN gold screen has returned positive. I am not aware of a history of treated latent TB though have reached out to her to confirm; I encouraged her to reach out to Dr. Samuel Lindsay as to a preferred latent TB treatment and to ensure no more extensive workup is needed to rule out active disease. If you have questions, please do not hesitate to call me. I look forward to following your patient along with you.     Sincerely,  Hoda Judge MD [facility]  Cell: [phone]

## 2022-08-18 DIAGNOSIS — D50.9 IRON DEFICIENCY ANEMIA, UNSPECIFIED IRON DEFICIENCY ANEMIA TYPE: ICD-10-CM

## 2022-08-18 DIAGNOSIS — R53.83 FATIGUE, UNSPECIFIED TYPE: ICD-10-CM

## 2022-08-19 DIAGNOSIS — I10 ESSENTIAL HYPERTENSION WITH GOAL BLOOD PRESSURE LESS THAN 140/90: ICD-10-CM

## 2022-08-19 RX ORDER — AMLODIPINE BESYLATE 10 MG/1
10 TABLET ORAL DAILY
Qty: 30 TABLET | Refills: 1 | Status: SHIPPED | OUTPATIENT
Start: 2022-08-19 | End: 2022-09-26

## 2022-08-20 LAB
BUN SERPL-MCNC: 17 MG/DL (ref 8–27)
BUN/CREAT SERPL: 19 (ref 12–28)
CALCIUM SERPL-MCNC: 10 MG/DL (ref 8.7–10.3)
CHLORIDE SERPL-SCNC: 103 MMOL/L (ref 96–106)
CO2 SERPL-SCNC: 23 MMOL/L (ref 20–29)
CREAT SERPL-MCNC: 0.91 MG/DL (ref 0.57–1)
EGFR: 71 ML/MIN/1.73
ERYTHROCYTE [DISTWIDTH] IN BLOOD BY AUTOMATED COUNT: 14.2 % (ref 11.7–15.4)
FERRITIN SERPL-MCNC: 333 NG/ML (ref 15–150)
GLUCOSE SERPL-MCNC: 94 MG/DL (ref 65–99)
HCT VFR BLD AUTO: 45.3 % (ref 34–46.6)
HGB BLD-MCNC: 14.9 G/DL (ref 11.1–15.9)
INTERPRETIVE COMMENT, 010391: NORMAL
MCH RBC QN AUTO: 30.4 PG (ref 26.6–33)
MCHC RBC AUTO-ENTMCNC: 32.9 G/DL (ref 31.5–35.7)
MCV RBC AUTO: 92 FL (ref 79–97)
PLATELET # BLD AUTO: 274 X10E3/UL (ref 150–450)
POTASSIUM SERPL-SCNC: 4.8 MMOL/L (ref 3.5–5.2)
RBC # BLD AUTO: 4.9 X10E6/UL (ref 3.77–5.28)
SODIUM SERPL-SCNC: 142 MMOL/L (ref 134–144)
T3FREE SERPL-MCNC: 3 PG/ML (ref 2–4.4)
T4 FREE SERPL-MCNC: 1.24 NG/DL (ref 0.82–1.77)
TSH SERPL DL<=0.005 MIU/L-ACNC: 0.42 UIU/ML (ref 0.45–4.5)
WBC # BLD AUTO: 3.4 X10E3/UL (ref 3.4–10.8)

## 2022-08-24 LAB
ALBUMIN SERPL-MCNC: 5 G/DL (ref 3.8–4.8)
ALBUMIN/GLOB SERPL: 1.9 {RATIO} (ref 1.2–2.2)
ALP SERPL-CCNC: 165 IU/L (ref 44–121)
ALT SERPL-CCNC: 12 IU/L (ref 0–32)
AST SERPL-CCNC: 16 IU/L (ref 0–40)
BASOPHILS # BLD AUTO: 0 X10E3/UL (ref 0–0.2)
BASOPHILS NFR BLD AUTO: 0 %
BILIRUB DIRECT SERPL-MCNC: <0.1 MG/DL (ref 0–0.4)
BILIRUB SERPL-MCNC: 0.3 MG/DL (ref 0–1.2)
BUN SERPL-MCNC: 18 MG/DL (ref 8–27)
BUN/CREAT SERPL: 21 (ref 12–28)
CALCIUM SERPL-MCNC: 9.8 MG/DL (ref 8.7–10.3)
CHLORIDE SERPL-SCNC: 104 MMOL/L (ref 96–106)
CO2 SERPL-SCNC: 22 MMOL/L (ref 20–29)
CREAT SERPL-MCNC: 0.86 MG/DL (ref 0.57–1)
CRP SERPL-MCNC: 2 MG/L (ref 0–10)
EGFR: 76 ML/MIN/1.73
EOSINOPHIL # BLD AUTO: 0.1 X10E3/UL (ref 0–0.4)
EOSINOPHIL NFR BLD AUTO: 3 %
ERYTHROCYTE [DISTWIDTH] IN BLOOD BY AUTOMATED COUNT: 13.9 % (ref 11.7–15.4)
ERYTHROCYTE [SEDIMENTATION RATE] IN BLOOD BY WESTERGREN METHOD: 20 MM/HR (ref 0–40)
GAMMA INTERFERON BACKGROUND BLD IA-ACNC: 0.05 IU/ML
GLOBULIN SER CALC-MCNC: 2.6 G/DL (ref 1.5–4.5)
GLUCOSE SERPL-MCNC: 92 MG/DL (ref 65–99)
HCT VFR BLD AUTO: 42 % (ref 34–46.6)
HGB BLD-MCNC: 14.6 G/DL (ref 11.1–15.9)
IMM GRANULOCYTES # BLD AUTO: 0 X10E3/UL (ref 0–0.1)
IMM GRANULOCYTES NFR BLD AUTO: 0 %
LYMPHOCYTES # BLD AUTO: 1.2 X10E3/UL (ref 0.7–3.1)
LYMPHOCYTES NFR BLD AUTO: 35 %
M TB IFN-G BLD-IMP: POSITIVE
M TB IFN-G CD4+ BCKGRND COR BLD-ACNC: 0.95 IU/ML
MCH RBC QN AUTO: 31.3 PG (ref 26.6–33)
MCHC RBC AUTO-ENTMCNC: 34.8 G/DL (ref 31.5–35.7)
MCV RBC AUTO: 90 FL (ref 79–97)
MITOGEN IGNF BLD-ACNC: >10 IU/ML
MONOCYTES # BLD AUTO: 0.4 X10E3/UL (ref 0.1–0.9)
MONOCYTES NFR BLD AUTO: 10 %
NEUTROPHILS # BLD AUTO: 1.7 X10E3/UL (ref 1.4–7)
NEUTROPHILS NFR BLD AUTO: 52 %
PLATELET # BLD AUTO: 253 X10E3/UL (ref 150–450)
POTASSIUM SERPL-SCNC: 4.5 MMOL/L (ref 3.5–5.2)
PROT SERPL-MCNC: 7.6 G/DL (ref 6–8.5)
QUANTIFERON INCUBATION, QF1T: ABNORMAL
QUANTIFERON TB2 AG: 0.06 IU/ML
RBC # BLD AUTO: 4.66 X10E6/UL (ref 3.77–5.28)
SERVICE CMNT-IMP: ABNORMAL
SODIUM SERPL-SCNC: 140 MMOL/L (ref 134–144)
WBC # BLD AUTO: 3.4 X10E3/UL (ref 3.4–10.8)

## 2022-08-26 ENCOUNTER — TELEPHONE (OUTPATIENT)
Dept: RHEUMATOLOGY | Age: 62
End: 2022-08-26

## 2022-08-26 NOTE — TELEPHONE ENCOUNTER
Pt called stating she has a sharp pain under her shoulder blade that goes into her shoulder, neck and chest bone. She stated it feels like her rotator cuff. Please advise.     [phone]

## 2022-08-26 NOTE — TELEPHONE ENCOUNTER
Spoke with pt who stated that the pain in her right shoulder comes on in the evening time, and she has a hard time sleeping at night. She has taken extra strength tylenol which didn't do anything, and today she took 800mg of motrin. She stated that the motrin did take the edge off of the pain. She is taking the leflunomide in the evening so I instructed her per Dr. Juan Sheppard to take it in the morning and to take aleve or motrin over the weekend for the pain. If it is still bothering her on Monday I asked her to call us back so that we can try and get her in for a shoulder injection if needed. She stated that if the pain is too much she will go to the ER this weekend.

## 2022-08-29 DIAGNOSIS — E05.90 HYPERTHYROIDISM: ICD-10-CM

## 2022-08-29 DIAGNOSIS — R79.89 ABNORMAL TSH: Primary | ICD-10-CM

## 2022-09-08 ENCOUNTER — HOSPITAL ENCOUNTER (OUTPATIENT)
Dept: NON INVASIVE DIAGNOSTICS | Age: 62
Discharge: HOME | End: 2022-09-08
Attending: INTERNAL MEDICINE
Payer: MEDICARE

## 2022-09-08 ENCOUNTER — HOSPITAL ENCOUNTER (OUTPATIENT)
Dept: PULMONOLOGY | Age: 62
Discharge: HOME | End: 2022-09-08
Attending: INTERNAL MEDICINE
Payer: MEDICARE

## 2022-09-08 VITALS — BODY MASS INDEX: 29.4 KG/M2 | WEIGHT: 194 LBS | HEIGHT: 68 IN

## 2022-09-08 DIAGNOSIS — D86.9 SARCOIDOSIS: ICD-10-CM

## 2022-09-08 DIAGNOSIS — D84.9 IMMUNOSUPPRESSION (HCC): ICD-10-CM

## 2022-09-08 LAB
ECHO AO ASC DIAM: 3.2 CM
ECHO AO ASCENDING AORTA INDEX: 1.58 CM/M2
ECHO AV AREA PEAK VELOCITY: 2.6 CM2
ECHO AV AREA/BSA PEAK VELOCITY: 1.3 CM2/M2
ECHO AV PEAK GRADIENT: 5 MMHG
ECHO AV PEAK VELOCITY: 1.1 M/S
ECHO AV VELOCITY RATIO: 0.91
ECHO LA DIAMETER INDEX: 1.88 CM/M2
ECHO LA DIAMETER: 3.8 CM
ECHO LA VOL 2C: 62 ML (ref 22–52)
ECHO LA VOL 4C: 49 ML (ref 22–52)
ECHO LA VOLUME AREA LENGTH: 59 ML
ECHO LA VOLUME INDEX A2C: 31 ML/M2 (ref 16–34)
ECHO LA VOLUME INDEX A4C: 24 ML/M2 (ref 16–34)
ECHO LA VOLUME INDEX AREA LENGTH: 29 ML/M2 (ref 16–34)
ECHO LV FRACTIONAL SHORTENING: 31 % (ref 28–44)
ECHO LV INTERNAL DIMENSION DIASTOLE INDEX: 1.73 CM/M2
ECHO LV INTERNAL DIMENSION DIASTOLIC: 3.5 CM (ref 3.9–5.3)
ECHO LV INTERNAL DIMENSION SYSTOLIC INDEX: 1.19 CM/M2
ECHO LV INTERNAL DIMENSION SYSTOLIC: 2.4 CM
ECHO LV IVSD: 1.5 CM (ref 0.6–0.9)
ECHO LV MASS 2D: 183 G (ref 67–162)
ECHO LV MASS INDEX 2D: 90.6 G/M2 (ref 43–95)
ECHO LV POSTERIOR WALL DIASTOLIC: 1.4 CM (ref 0.6–0.9)
ECHO LV RELATIVE WALL THICKNESS RATIO: 0.8
ECHO LVOT AREA: 3.1 CM2
ECHO LVOT DIAM: 2 CM
ECHO LVOT PEAK GRADIENT: 4 MMHG
ECHO LVOT PEAK VELOCITY: 1 M/S
ECHO MV A VELOCITY: 1.01 M/S
ECHO MV AREA PHT: 4.8 CM2
ECHO MV E DECELERATION TIME (DT): 158.7 MS
ECHO MV E VELOCITY: 0.73 M/S
ECHO MV E/A RATIO: 0.72
ECHO MV PRESSURE HALF TIME (PHT): 46 MS
ECHO PULMONARY ARTERY END DIASTOLIC PRESSURE: 15 MMHG
ECHO PV MAX VELOCITY: 0.8 M/S
ECHO PV PEAK GRADIENT: 3 MMHG
ECHO RV INTERNAL DIMENSION: 3.2 CM
ECHO TV REGURGITANT MAX VELOCITY: 2.28 M/S
ECHO TV REGURGITANT PEAK GRADIENT: 21 MMHG

## 2022-09-08 PROCEDURE — 93306 TTE W/DOPPLER COMPLETE: CPT | Performed by: SPECIALIST

## 2022-09-08 PROCEDURE — 94726 PLETHYSMOGRAPHY LUNG VOLUMES: CPT

## 2022-09-08 PROCEDURE — 94010 BREATHING CAPACITY TEST: CPT

## 2022-09-08 PROCEDURE — 94729 DIFFUSING CAPACITY: CPT

## 2022-09-08 PROCEDURE — 93306 TTE W/DOPPLER COMPLETE: CPT

## 2022-09-12 DIAGNOSIS — J30.2 SEASONAL ALLERGIC RHINITIS: ICD-10-CM

## 2022-09-12 DIAGNOSIS — L30.9 ECZEMA, UNSPECIFIED TYPE: ICD-10-CM

## 2022-09-12 DIAGNOSIS — J45.20 MILD INTERMITTENT ASTHMA WITHOUT COMPLICATION: ICD-10-CM

## 2022-09-12 RX ORDER — CETIRIZINE HCL 10 MG
TABLET ORAL
Qty: 90 TABLET | Refills: 3 | Status: SHIPPED | OUTPATIENT
Start: 2022-09-12

## 2022-09-12 RX ORDER — TRIAMCINOLONE ACETONIDE 1 MG/G
OINTMENT TOPICAL 2 TIMES DAILY
Qty: 30 G | Refills: 0 | Status: SHIPPED | OUTPATIENT
Start: 2022-09-12 | End: 2022-09-26

## 2022-09-15 ENCOUNTER — OFFICE VISIT (OUTPATIENT)
Dept: INTERNAL MEDICINE CLINIC | Age: 62
End: 2022-09-15
Payer: MEDICARE

## 2022-09-15 VITALS
WEIGHT: 197 LBS | HEART RATE: 84 BPM | OXYGEN SATURATION: 97 % | DIASTOLIC BLOOD PRESSURE: 83 MMHG | TEMPERATURE: 98 F | SYSTOLIC BLOOD PRESSURE: 132 MMHG | BODY MASS INDEX: 29.86 KG/M2 | RESPIRATION RATE: 18 BRPM | HEIGHT: 68 IN

## 2022-09-15 DIAGNOSIS — R76.12 POSITIVE QUANTIFERON-TB GOLD TEST: ICD-10-CM

## 2022-09-15 DIAGNOSIS — F90.9 ATTENTION DEFICIT HYPERACTIVITY DISORDER (ADHD), UNSPECIFIED ADHD TYPE: ICD-10-CM

## 2022-09-15 DIAGNOSIS — R91.1 PULMONARY NODULE: ICD-10-CM

## 2022-09-15 DIAGNOSIS — R79.89 LOW TSH LEVEL: ICD-10-CM

## 2022-09-15 DIAGNOSIS — M19.90 CHRONIC INFLAMMATORY ARTHRITIS: ICD-10-CM

## 2022-09-15 DIAGNOSIS — E05.90 SUBCLINICAL HYPERTHYROIDISM: ICD-10-CM

## 2022-09-15 DIAGNOSIS — D86.9 SARCOID: ICD-10-CM

## 2022-09-15 DIAGNOSIS — J45.40 MODERATE PERSISTENT ASTHMA WITHOUT COMPLICATION: ICD-10-CM

## 2022-09-15 DIAGNOSIS — I10 ESSENTIAL HYPERTENSION: Primary | ICD-10-CM

## 2022-09-15 DIAGNOSIS — E78.2 MIXED HYPERLIPIDEMIA: ICD-10-CM

## 2022-09-15 PROBLEM — M79.7 FIBROMYALGIA: Status: ACTIVE | Noted: 2020-10-08

## 2022-09-15 LAB
CHOLEST SERPL-MCNC: 254 MG/DL
HDLC SERPL-MCNC: 39 MG/DL
LDL CHOLESTEROL POC: 176 MG/DL
NON-HDL GOAL (POC): 216
TCHOL/HDL RATIO (POC): 4.6
TRIGL SERPL-MCNC: 196 MG/DL

## 2022-09-15 PROCEDURE — 80061 LIPID PANEL: CPT | Performed by: NURSE PRACTITIONER

## 2022-09-15 PROCEDURE — G8752 SYS BP LESS 140: HCPCS | Performed by: NURSE PRACTITIONER

## 2022-09-15 PROCEDURE — G9899 SCRN MAM PERF RSLTS DOC: HCPCS | Performed by: NURSE PRACTITIONER

## 2022-09-15 PROCEDURE — G8417 CALC BMI ABV UP PARAM F/U: HCPCS | Performed by: NURSE PRACTITIONER

## 2022-09-15 PROCEDURE — 3017F COLORECTAL CA SCREEN DOC REV: CPT | Performed by: NURSE PRACTITIONER

## 2022-09-15 PROCEDURE — 99214 OFFICE O/P EST MOD 30 MIN: CPT | Performed by: NURSE PRACTITIONER

## 2022-09-15 PROCEDURE — G9717 DOC PT DX DEP/BP F/U NT REQ: HCPCS | Performed by: NURSE PRACTITIONER

## 2022-09-15 PROCEDURE — G8754 DIAS BP LESS 90: HCPCS | Performed by: NURSE PRACTITIONER

## 2022-09-15 PROCEDURE — G8427 DOCREV CUR MEDS BY ELIG CLIN: HCPCS | Performed by: NURSE PRACTITIONER

## 2022-09-15 RX ORDER — BUPROPION HYDROCHLORIDE 150 MG/1
TABLET ORAL
COMMUNITY
End: 2022-09-15

## 2022-09-15 RX ORDER — DEXTROAMPHETAMINE SACCHARATE, AMPHETAMINE ASPARTATE, DEXTROAMPHETAMINE SULFATE AND AMPHETAMINE SULFATE 5; 5; 5; 5 MG/1; MG/1; MG/1; MG/1
20 TABLET ORAL 2 TIMES DAILY
Qty: 60 TABLET | Refills: 0 | Status: SHIPPED | OUTPATIENT
Start: 2022-09-15

## 2022-09-15 RX ORDER — MULTIVIT WITH MIN/MFOLATE/K2 340-15/3 G
POWDER (GRAM) ORAL
COMMUNITY

## 2022-09-15 NOTE — PROGRESS NOTES
Subjective: (As above and below)     Chief Complaint   Patient presents with    Follow-up    Results     Echo and labs      Jabier VallejoAlecia Quinn is a 58y.o. 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 Family History   Problem Relation Age of Onset    Hypertension Mother     Diabetes Mother     Stroke Mother          ~ 67 yo    Heart Disease Father          ~ 78 yo    Gout Father     Breast Cancer Cousin         mat.  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 No pets in the house. Current Outpatient Medications   Medication Sig    cholecalciferol, vitD3,/vit K2 (vitamin D3-vitamin K2) 125-90 mcg cap Take  by mouth.    triamcinolone acetonide (KENALOG) 0.1 % ointment APPLY TO AFFECTED AREA TWO (2) TIMES A DAY. USE THIN LAYER    cetirizine (ZYRTEC) 10 mg tablet TAKE 1 TABLET BY MOUTH NIGHTLY    amLODIPine (NORVASC) 10 mg tablet Take 1 Tablet by mouth daily. leflunomide (ARAVA) 20 mg tablet Take 1 Tablet by mouth daily. Take half tab daily for 7 days and then one tab daily if tolerated    diclofenac EC (VOLTAREN) 75 mg EC tablet TAKE 1 TABLET BY MOUTH TWO (2) TIMES DAILY AS NEEDED FOR PAIN. DO NOT TAKE IBUPROFEN ON DAYS YOU TAKE THIS    pregabalin (LYRICA) 75 mg capsule Take 75 mg by mouth two (2) times a day. albuterol (PROVENTIL HFA, VENTOLIN HFA, PROAIR HFA) 90 mcg/actuation inhaler Take 1 Puff by inhalation every six (6) hours as needed for Wheezing. fluticasone propionate (FLONASE) 50 mcg/actuation nasal spray USE 2 SPRAYS IN EACH NOSTRIL TWICE A DAY    ascorbic acid, vitamin C, (VITAMIN C) 500 mg tablet Take  by mouth. FOLIC ACID/MULTIVIT-MIN/LUTEIN (CENTRUM SILVER PO) Take  by mouth.    pravastatin (PRAVACHOL) 10 mg tablet TAKE 1 TABLET BY MOUTH EVERY DAY AT NIGHT (Patient not taking: No sig reported)    montelukast (SINGULAIR) 10 mg tablet TAKE 1 TABLET BY MOUTH EVERY DAY (Patient not taking: No sig reported)    FLOVENT  mcg/actuation inhaler TAKE 2 PUFFS BY MOUTH TWICE DAILY    cholecalciferol, VITAMIN D3, (VITAMIN D3) 5,000 unit tab tablet Take  by mouth daily. (Patient not taking: Reported on 9/15/2022)     No current facility-administered medications for this visit. Review of Systems:   Constitutional:    Negative for fever and chills, negative diaphoresis. HEENT:              Negative for neck pain and stiffness.   Eyes:                  Negative for visual disturbance, itching, redness or discharge. Respiratory:        Negative for cough and shortness of breath. Cardiovascular:  Negative for chest pain and palpitations. Gastrointestinal: Negative for nausea, vomiting, abdominal pain, diarrhea or constipation. Genitourinary:     Negative for dysuria and frequency. Musculoskeletal: Negative for falls, tenderness and swelling. Skin:                    Negative for rash, masses or lesions. Neurological:       Negative for dizzyness, seizure, loss of consciousness, weakness and numbness. Objective:     Vitals:    09/15/22 1344   BP: 132/83   Pulse: 84   Resp: 18   Temp: 98 °F (36.7 °C)   TempSrc: Temporal   SpO2: 97%   Weight: 197 lb (89.4 kg)   Height: 5' 8\" (1.727 m)     Results for orders placed or performed in visit on 09/15/22   AMB POC LIPID PROFILE   Result Value Ref Range    Cholesterol (POC) 254     Triglycerides (POC) 196     HDL Cholesterol (POC) 39     LDL Cholesterol (POC) 176 MG/DL    Non-HDL Goal (POC) 216     TChol/HDL Ratio (POC) 4.6        Gen: Oriented to person, place and time and well-developed, well-nourished and in no distress. HEENT:    Head: normocephalic and atraumatic. Eyes:  EOM are normal. Pupils equal and round. Neck:  Normal range of motion. Neck supple. Cardiovascular: normal rate, regular rhythm and normal heart sounds. Pulmonary/Chest:  Effort normal and breath sounds normal.  No respiratory distress. No wheezes, no rales. Abdominal: soft, normal  bowel sounds. Musculoskeletal:  No edema, no tenderness. No calf tenderness or edema. Neurological:  Alert, oriented to person, place and time. Skin: skin is warm and dry. Assessment/ Plan:       1. Essential hypertension      2. Mixed hyperlipidemia  She agrees to resume statin, fu labs in 6 weeks call if a/e  - AMB POC LIPID PROFILE  - LIPID PANEL; Future  - LIPID PANEL    3. Sarcoid    - REFERRAL TO PULMONARY DISEASE    4.  Chronic inflammatory arthritis      5. Moderate persistent asthma without complication    6. Positive QuantiFERON-TB Gold test  Repeat, false +  asymptomatic  - QUANTIFERON-TB GOLD PLUS  - REFERRAL TO PULMONARY DISEASE    7. Pulmonary nodule    - REFERRAL TO PULMONARY DISEASE    8. Attention deficit hyperactivity disorder (ADHD), unspecified ADHD type    - dextroamphetamine-amphetamine (ADDERALL) 20 mg tablet; Take 1 Tablet by mouth two (2) times a day. Max Daily Amount: 40 mg. Dispense: 60 Tablet; Refill: 0  - Vivia [address] 13 (MW); Future  - TOXASSURE SELECT 13 (MW)    9. Low TSH level      10. Subclinical hyperthyroidism    - THYROID CASCADE PROFILE; Future  - THYROID CASCADE PROFILE      I have discussed the diagnosis with the patient and the intended plan as seen in the above orders. The patient has received an after-visit summary and questions were answered concerning future plans. Pt conveyed understanding of plan. Medication Side Effects and Warnings were discussed with patient: yes  Patient Labs were reviewed: yes  Patient Past Records were reviewed:  yes    Jv Heck.  Elicia Ocampo NP

## 2022-09-15 NOTE — PROGRESS NOTES
Chief Complaint   Patient presents with    Follow-up    Results     Echo and labs        1. \"Have you been to the ER, urgent care clinic since your last visit? Hospitalized since your last visit? \" No    2. \"Have you seen or consulted any other health care providers outside of the [address] [place] [address] since your last visit? \" No     3. For patients aged 39-70: Has the patient had a colonoscopy / FIT/ Cologuard? Yes - no Care Gap present      If the patient is female:    4. For patients aged 41-77: Has the patient had a mammogram within the past 2 years? Yes - no Care Gap present      5. For patients aged 21-65: Has the patient had a pap smear?  Yes - no Care Gap present

## 2022-09-20 LAB — DRUGS UR: NORMAL

## 2022-09-24 DIAGNOSIS — I10 ESSENTIAL HYPERTENSION WITH GOAL BLOOD PRESSURE LESS THAN 140/90: ICD-10-CM

## 2022-09-26 RX ORDER — AMLODIPINE BESYLATE 10 MG/1
TABLET ORAL
Qty: 30 TABLET | Refills: 1 | Status: SHIPPED | OUTPATIENT
Start: 2022-09-26 | End: 2022-10-21

## 2022-09-26 RX ORDER — TRIAMCINOLONE ACETONIDE 1 MG/G
OINTMENT TOPICAL
Qty: 30 G | Refills: 0 | Status: SHIPPED | OUTPATIENT
Start: 2022-09-26

## 2022-10-06 ENCOUNTER — TRANSCRIBE ORDER (OUTPATIENT)
Dept: SCHEDULING | Age: 62
End: 2022-10-06

## 2022-10-06 DIAGNOSIS — J32.9 CHRONIC SINUSITIS, UNSPECIFIED LOCATION: Primary | ICD-10-CM

## 2022-10-19 ENCOUNTER — TELEPHONE (OUTPATIENT)
Dept: RHEUMATOLOGY | Age: 62
End: 2022-10-19

## 2022-10-19 NOTE — TELEPHONE ENCOUNTER
Pt called to check if she completed labs. Informed pt she took labs a day after he placed orders. Pt stated understanding.

## 2022-10-20 ENCOUNTER — OFFICE VISIT (OUTPATIENT)
Dept: RHEUMATOLOGY | Age: 62
End: 2022-10-20
Payer: MEDICARE

## 2022-10-20 VITALS
TEMPERATURE: 98.2 F | BODY MASS INDEX: 29.86 KG/M2 | RESPIRATION RATE: 16 BRPM | SYSTOLIC BLOOD PRESSURE: 127 MMHG | WEIGHT: 197 LBS | HEART RATE: 72 BPM | OXYGEN SATURATION: 95 % | DIASTOLIC BLOOD PRESSURE: 86 MMHG | HEIGHT: 68 IN

## 2022-10-20 DIAGNOSIS — M19.90 CHRONIC INFLAMMATORY ARTHRITIS: ICD-10-CM

## 2022-10-20 DIAGNOSIS — D86.9 SARCOIDOSIS: ICD-10-CM

## 2022-10-20 DIAGNOSIS — M19.272 SECONDARY OSTEOARTHRITIS OF LEFT ANKLE: ICD-10-CM

## 2022-10-20 DIAGNOSIS — R74.8 ELEVATED ALKALINE PHOSPHATASE LEVEL: Primary | ICD-10-CM

## 2022-10-20 PROCEDURE — G9717 DOC PT DX DEP/BP F/U NT REQ: HCPCS | Performed by: INTERNAL MEDICINE

## 2022-10-20 PROCEDURE — 3074F SYST BP LT 130 MM HG: CPT | Performed by: INTERNAL MEDICINE

## 2022-10-20 PROCEDURE — G8427 DOCREV CUR MEDS BY ELIG CLIN: HCPCS | Performed by: INTERNAL MEDICINE

## 2022-10-20 PROCEDURE — G8417 CALC BMI ABV UP PARAM F/U: HCPCS | Performed by: INTERNAL MEDICINE

## 2022-10-20 PROCEDURE — 3017F COLORECTAL CA SCREEN DOC REV: CPT | Performed by: INTERNAL MEDICINE

## 2022-10-20 PROCEDURE — G8754 DIAS BP LESS 90: HCPCS | Performed by: INTERNAL MEDICINE

## 2022-10-20 PROCEDURE — G8752 SYS BP LESS 140: HCPCS | Performed by: INTERNAL MEDICINE

## 2022-10-20 PROCEDURE — G9899 SCRN MAM PERF RSLTS DOC: HCPCS | Performed by: INTERNAL MEDICINE

## 2022-10-20 PROCEDURE — 99215 OFFICE O/P EST HI 40 MIN: CPT | Performed by: INTERNAL MEDICINE

## 2022-10-20 PROCEDURE — 3078F DIAST BP <80 MM HG: CPT | Performed by: INTERNAL MEDICINE

## 2022-10-20 RX ORDER — IBUPROFEN 800 MG/1
800 TABLET ORAL
Qty: 90 TABLET | Refills: 2 | Status: SHIPPED | OUTPATIENT
Start: 2022-10-20 | End: 2022-11-19

## 2022-10-20 RX ORDER — PANTOPRAZOLE SODIUM 40 MG/1
40 TABLET, DELAYED RELEASE ORAL DAILY
COMMUNITY
Start: 2022-09-28

## 2022-10-20 RX ORDER — IPRATROPIUM BROMIDE 42 UG/1
SPRAY, METERED NASAL
COMMUNITY
Start: 2022-10-06

## 2022-10-20 RX ORDER — LANOLIN ALCOHOL/MO/W.PET/CERES
50 CREAM (GRAM) TOPICAL DAILY
COMMUNITY
Start: 2022-10-02 | End: 2022-10-20

## 2022-10-20 RX ORDER — ISONIAZID 300 MG/1
300 TABLET ORAL DAILY
COMMUNITY
Start: 2022-09-28 | End: 2022-10-20

## 2022-10-20 NOTE — PROGRESS NOTES
REASON FOR VISIT:    is a 58 y.o. female with history of rheumatoid arthritis and sarcoidosis who is returning for Rheumatology followup. HISTORY OF PRESENT ILLNESS    Pt returns for a follow up. Pt started Leflunomide since her last visit, and she took it for 30 days. She says that she did not like it, and she had more pain after she started it. She says that this medication disrupted her bowel movements. She has been taking 800mg Ibuprofen for pain, which helps. She notes that she does not take Ibuprofen every day, only when she has bad pain. Pt says that her left basilar thumb pain is great enough that she has trouble opening things. She also has left anterolateral that has worsened since her last visit. She got some lasting pain relief from her last finger injection. She does not want to get another injection because she said that the injection was very painful for days after her last injection, even though ultimately it helped for months. Pt picked up the isoniazid but did not start it. Pt says that she doesn't feel she has a good rapport with Dr. Ree Ross, looking for a new Pulmonologist. Pt coughs occasionally. She notes that she coughs more towards the evening. She thinks that this is caused by the congestion she has from allergies. She feels she is gradually getting more dyspneic with walking up a flight of stairs. She notes that sometimes she can \"fly\" up her 16 steps but sometimes she is out of breath at the top of the stairs. Pt does not plan on going back to see the allergist again. She wants to manage her allergies without taking shots. She takes Zyrtec and Flonase to keep her allergies under control. Pt says that she has been able to keep her weight steady and she is slowly losing weight. Pt says that she has some acid reflux with certain foods. She has Protonix, which she takes inconsistently. No GI upset with ibuprofen, no dark or bloody stools.       Disease History:  Initial visit 8/6/21. In the 1980's, over about 1 month developed polyarticular pain and swelling moving from feet to knees to elbows, treated with gold injections and she thinks methotrexate early on though not in the last decade. Now, getting gradually progressive \"aches and pains. \" Right foot, left foot, under arch of righ t foot, both knees, right > left hand with some dorsal hand swelling, bilateral elbows, low back and hips. Joint pains worsen together. Hands feel tight. Early morning and mid-evening are the worst.   She was having intermittent chest pains around 1993, went to [facility] and identified \"lung spot\", diagnosed with pulmonary sarcoidosis. Has been on recurrent prednisone tapers for lung flares, though last taper was for back pain last November. Now following with Dr. Kathrine García, uses inhalers. Recently started CPAP for ANITA. Has a mild residual nocturnal-predominant cough she attributes to allergies after discovery of multiple environmental allergies. Has been diagnosed with nephrolithiasis she thinks diagnosed on workup of dysuria roughly 3 years ago. Has seen Dr. Arianne Tavarez who prescribed 100mg bid Lyrica which has been helpful; had done ELZBIETA's L4-S1. MRI showed moderate L4-5 central stenosis with L4-5 and L5-S1 neural foraminal stenoses. Pain now stays in low back and hips, but with prolonged sitting or standing gets radiation up and down spine. Taking ibuprofen 800mg, which takes the edge off, takes it 2-3x/day now. Had been referred to Dr. Tabatha Mc in Rheumatology with whom she was prescribed Plaquenil (hydroxychloroquine), 200mg bid for about 2 weeks, was having stomach upset and concern for side effects so self-discontinued. REVIEW OF SYSTEMS  A comprehensive review of systems was negative except for that written in the HPI.   A 10-point review of systems is per the new patient questionnaire, which has been reviewed extensively and scanned into the electronic medical record for future reference. Review of systems is as above and is otherwise negative. ALLERGIES  Latex, Iodine, Lisinopril, Shellfish derived, and Simvastatin    MEDICATIONS  Current Outpatient Medications   Medication Sig    ipratropium (ATROVENT) 42 mcg (0.06 %) nasal spray 2 (TWO) SPRAY EACH NOSTRIL 2 TIMES DAILY    isoniazid (NYDRAZID) 300 mg tablet Take 300 mg by mouth daily. pantoprazole (PROTONIX) 40 mg tablet Take 40 mg by mouth daily. pyridoxine, vitamin B6, (VITAMIN B-6) 50 mg tablet Take 50 mg by mouth daily. leflunomide (ARAVA) 20 mg tablet Take 1 Tablet by mouth daily. amLODIPine (NORVASC) 10 mg tablet TAKE 1 TABLET BY MOUTH EVERY DAY    triamcinolone acetonide (KENALOG) 0.1 % ointment APPLY A THIN LAYER TO AFFECTED AREA TWICE A DAY    cholecalciferol, vitD3,/vit K2 (vitamin D3-vitamin K2) 125-90 mcg cap Take  by mouth. dextroamphetamine-amphetamine (ADDERALL) 20 mg tablet Take 1 Tablet by mouth two (2) times a day. Max Daily Amount: 40 mg.    cetirizine (ZYRTEC) 10 mg tablet TAKE 1 TABLET BY MOUTH NIGHTLY    diclofenac EC (VOLTAREN) 75 mg EC tablet TAKE 1 TABLET BY MOUTH TWO (2) TIMES DAILY AS NEEDED FOR PAIN. DO NOT TAKE IBUPROFEN ON DAYS YOU TAKE THIS    pregabalin (LYRICA) 75 mg capsule Take 75 mg by mouth two (2) times a day. albuterol (PROVENTIL HFA, VENTOLIN HFA, PROAIR HFA) 90 mcg/actuation inhaler Take 1 Puff by inhalation every six (6) hours as needed for Wheezing. fluticasone propionate (FLONASE) 50 mcg/actuation nasal spray USE 2 SPRAYS IN EACH NOSTRIL TWICE A DAY    ascorbic acid, vitamin C, (VITAMIN C) 500 mg tablet Take  by mouth. FOLIC ACID/MULTIVIT-MIN/LUTEIN (CENTRUM SILVER PO) Take  by mouth. No current facility-administered medications for this visit.        PAST MEDICAL HISTORY  Past Medical History:   Diagnosis Date    Adult ADHD     Calculus of kidney     Depression     Hypercholesterolemia     Hypertension     Rheumatoid arthritis ([address]     Sarcoid     Sinus Infection        FAMILY HISTORY  family history includes Breast Cancer in her cousin; Diabetes in her mother; Gout in her father; Heart Disease in her father; Hypertension in her mother; Stroke in her mother. SOCIAL HISTORY  She  reports that she has never smoked. She has never used smokeless tobacco. She reports that she does not drink alcohol and does not use drugs. Social History     Social History Narrative    Living with son (28) and son's father. No pets in the house. 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 DATA  Visit Vitals  /86 (BP 1 Location: Left upper arm, BP Patient Position: Sitting, BP Cuff Size: Adult long)   Pulse 72   Temp 98.2 °F (36.8 °C) (Oral)   Resp 16   Ht 5' 8\" (1.727 m)   Wt 197 lb (89.4 kg)   SpO2 95%   BMI 29.95 kg/m²       General:  The patient is well developed, well nourished, alert, and in no apparent distress. Eyes: Sclera are anicteric. No conjunctival injection. HEENT:  Oropharynx is clear. No oral ulcers. Adequate salivary pooling. No cervical or supraclavicular lymphadenopathy. Lungs:  Clear to auscultation bilaterally, without wheeze or stridor. Normal respiratory effort. Cor:  Regular rate and rhythm. No murmur rub or gallop. Abdomen: Soft, non-tender, without hepatomegaly or masses. Extremities: No calf tenderness, trace B LE edema distal 1/3 legs. Warm and well perfused. Skin:  Interval resolved nodule on right upper arm, resolved foot plaque. Neuro: Nonfocal, no foot or wrist drop  Musculoskeletal:    A comprehensive musculoskeletal exam was performed for all joints of each upper and lower extremity and assessed for swelling, tenderness and range of motion. Results are documented as below:  No evidence of synovitis in the small joints of the hands, wrists, shoulders, elbows, hips, knees or ankles. Left >> right CMC tenderness without ashley synovitis. No paralumbar tenderness today  No ankle synovitis.   Left anterior joint line tenderness of ankle extending toward anterolateral proximal dorsal foot, interval resolved MTP squeeze tenderness. Labs:  8/18/22: Ferririn 333, T3 free 3, T4 free 1.24, TSH 0.424, Cr 0.91, WBC 3.4, QuantiFERON plus pos, bilirubin direct <0.10, ESR 20, Cr 0.86, Tbili 0.3, AST 16, ALT 12, Alk phos 165, CBC WNL, CRP 2 mg/L  2/21/22: LASHON speckled pattern 1:80, Calcitrol 74.8, Hep B evaluation neg, CCP Ab IgG/IgA 7, Rheum factor <14, LASHON by IFA (RDL) pos, ESR 27, Cr 1.04, Alk phos 160, AST 24, ALT 19, Tbili 0.5, WBC 2.7, HGB 14.6, Plt 272, CRP 2 mg/L  6/9/21: TSH WNL; GGT 55 (WNL), Vit D 25.9;   4/22/21: Ca 10.2, Cr 0.88, Tbili 0.5, AST 19, ALT 9, AlkP 153; HDL 45, ; WBC 3.2 (no diff), Hgb 14.7, Plt 261      Imaging:  CT CHEST WO CONT (7/12/22): 1. The mediastinal adenopathy is stable. The pulmonary nodules are stable. No new pulmonary nodules are noted. DEXA BONE DENSITY (3/1/22): Femoral Neck Left:  Bone mineral density (gm/cm2):  0.753 g/cm?  % of peak bone mass:  73%  % for age matched controls:  71%  T-score:  -2.1  Z-score:  -2.2     Femoral Neck Right:  Bone mineral density (gm/cm2):  0.700 g/cm?  % of peak bone mass:  67%  % for age matched controls:  66%  T-score:  -2.4  Z-score:  -2.6     Total Hip Left:  Bone mineral density (gm/cm2):  0.783 g/cm?  % of peak bone mass:  78%  % for age matched controls:  73%  T-score:  -1.8  Z-score:  -2.3     Total Hip Right:  Bone mineral density (gm/cm2):  0.718 g/cm?  % of peak bone mass:  71%  % for age matched controls:  67%  T-score:  -2.3  Z-score:  -2.8     Lumbar Spine:  L1-4 or specify  Bone mineral density (gm/cm2):  1.074 g/cm?  % of peak bone mass:  90%  % for age matched controls:  88%  T-score:  -1.0  Z-score:  -1.2    12/8/21 CT chest without:  THYROID: No nodule. MEDIASTINUM: The mildly enlarged mediastinal lymph nodes in the right  paratracheal region, pretracheal region subcarinal region and left prevascular  space are unchanged. Mario Blanca   CANDICE: The mildly enlarged hilar lymph nodes are unchanged. Evaluation is  somewhat difficult due to lack of intravenous contrast material but there has been no change. .  THORACIC AORTA: No aneurysm. MAIN PULMONARY ARTERY: Normal in caliber. TRACHEA/BRONCHI: Patent. ESOPHAGUS: No wall thickening or dilatation. HEART: Normal in size. PLEURA: No effusion or pneumothorax. LUNGS: The bilateral pulmonary nodules are unchanged in size and appearance dating back to 5/4/2019. Mild nodular thickening in the plane of the major fissures is unchanged as well. Linear areas of scarring posteriorly in each lower lobe are stable. Scarring adjacent to the thoracic spine osteophytes in the right lower lobe is stable. . INCIDENTALLY IMAGED UPPER ABDOMEN: The left renal cyst is imaged. No further evaluation is necessary. Trula Blew BONES: No destructive bone lesion. IMPRESSION  1. The adenopathy is stable dating back to 5/4/2019. Additionally, the pulmonary nodules are stable as well. No new nodules are noted. No nodules have increased in size. 5/17/21: Chest CT: IMPRESSION  Stable bilateral pulmonary nodules, hilar and mediastinal lymphadenopathy compared to May 2019.    9/24/20 MR Lspine: Moderate L4-5 central stenosis with mild to moderate left and mild right neural foraminal narrowing. Small inferior disc extrusion at L5-S1 with moderate left and mild right neural foraminal stenosis. 6/27/19 DXA:  This patient is osteopenic using the World Health Organization criteria  10 year probability of major osteoporotic fracture: 5.8%  10 year probability of hip fracture: 0.9%        ASSESSMENT AND PLAN  Ms. Zeinab Lo is a 58 y.o. female with history of rheumatoid arthritis and sarcoidosis with now primarily degenerative-character arthralgias in the [facility] and left ankle. Insufficient trial of leflunomide, but no current inflammatory targets for this so reasonable to remain off of it. Reviewed cautious ibuprofen use, tylenol to help space dosing. Given her reticence to start LTBI treatment and multiple recent false positives, will first recheck QFN, low enough suspicion that holding treatment would be reasonable if negative on repeat. 1. Chronic inflammatory arthritis  - leflunomide (ARAVA) 20 mg tablet; Take 1 Tablet by mouth daily. Take half tab daily for 7 days and then one tab daily if tolerated  Dispense: 30 Tablet; Refill: 2  - HEPATIC FUNCTION PANEL; Future  - QUANTIFERON-TB GOLD PLUS; Future  - HEPATIC FUNCTION PANEL  - QUANTIFERON-TB GOLD PLUS  - C REACTIVE PROTEIN, QT; Future  - CBC WITH AUTOMATED DIFF; Future  - METABOLIC PANEL, COMPREHENSIVE; Future  - SED RATE (ESR); Future    2. Sarcoidosis  - leflunomide (ARAVA) 20 mg tablet; Take 1 Tablet by mouth daily. Take half tab daily for 7 days and then one tab daily if tolerated  Dispense: 30 Tablet; Refill: 2  - HEPATIC FUNCTION PANEL; Future  - QUANTIFERON-TB GOLD PLUS; Future  - HEPATIC FUNCTION PANEL  - QUANTIFERON-TB GOLD PLUS  - C REACTIVE PROTEIN, QT; Future  - CBC WITH AUTOMATED DIFF; Future  - METABOLIC PANEL, COMPREHENSIVE; Future  - SED RATE (ESR); Future  - PFT COMPLETE; Future    3. Immunosuppression ([address]  - HEPATIC FUNCTION PANEL; Future  - QUANTIFERON-TB GOLD PLUS; Future  - C REACTIVE PROTEIN, QT; Future  - CBC WITH AUTOMATED DIFF; Future  - METABOLIC PANEL, COMPREHENSIVE; Future  - SED RATE (ESR); Future  - PFT COMPLETE; Future       Patient Instructions   1) Try to wear a spica splint to immobilize your thumb when you are being inactive with your hand. 2) You can take 650mg of Tylenol up to 3 times a day for joint pain. Continue to take 800mg Ibuprofen as needed for breakthrough joint pain, but try not to take this more than 3 days out of the week. 3) Continue to use Voltaren gel on hands for joint pain as needed. 4) Continue to work on weight loss. Every pound lost takes 4 pounds of pressure off of your knees. 5) Hold off on starting th TB medications.  If your TB test is positive again I will push you more strongly to start this. 5) Check labs ASAP. 6) Follow up in 2 months. Let me know if you have any questions or concerns in the meantime. Orders Placed This Encounter    QUANTIFERON-TB GOLD PLUS    ALKALINE PHOSPHATASE, BONE    C REACTIVE PROTEIN, QT    CBC WITH AUTOMATED DIFF    METABOLIC PANEL, COMPREHENSIVE    SED RATE (ESR)    LD    ipratropium (ATROVENT) 42 mcg (0.06 %) nasal spray    DISCONTD: isoniazid (NYDRAZID) 300 mg tablet    pantoprazole (PROTONIX) 40 mg tablet    DISCONTD: pyridoxine, vitamin B6, (VITAMIN B-6) 50 mg tablet    ibuprofen (MOTRIN) 800 mg tablet         Medications: I have discontinued Norbert Waite's diclofenac EC. I am also having her start on ibuprofen. Additionally, I am having her maintain her FOLIC ACID/MULTIVIT-MIN/LUTEIN (CENTRUM SILVER PO), ascorbic acid (vitamin C), fluticasone propionate, albuterol, pregabalin, cetirizine, vitamin D3-vitamin K2, dextroamphetamine-amphetamine, triamcinolone acetonide, leflunomide, ipratropium, and pantoprazole. Follow up: No follow-ups on file. Face to face time: 40 minutes  Note preparation and records review day of service: 29 minutes  Total provider time day of service: 69 minutes    This was scribed by Norma Solo in the presence of Dr. Julian Guerrero. The note was reviewed and amended personally, and I agree with the above information.     Magdalena Amado MD    Adult Rheumatology   [facility]  [facility], [address] [place] [address]   Phone [phone]  Fax [phone]

## 2022-10-20 NOTE — PROGRESS NOTES
Chief Complaint   Patient presents with    Joint Pain     1. Have you been to the ER, urgent care clinic since your last visit? Hospitalized since your last visit? No    2. Have you seen or consulted any other health care providers outside of the [address] [place] [address] since your last visit? Include any pap smears or colon screening.  No

## 2022-10-20 NOTE — PATIENT INSTRUCTIONS
1) Try to wear a spica splint to immobilize your thumb when you are being inactive with your hand. 2) You can take 650mg of Tylenol up to 3 times a day for joint pain. Continue to take 800mg Ibuprofen as needed for breakthrough joint pain, but try not to take this more than 3 days out of the week. 3) Continue to use Voltaren gel on hands for joint pain as needed. 4) Continue to work on weight loss. Every pound lost takes 4 pounds of pressure off of your knees. 5) Hold off on starting th TB medications. If your TB test is positive again I will push you more strongly to start this. 5) Check labs ASAP. 6) Follow up in 2 months. Let me know if you have any questions or concerns in the meantime.

## 2023-01-27 ENCOUNTER — VIRTUAL VISIT (OUTPATIENT)
Dept: INTERNAL MEDICINE CLINIC | Age: 63
End: 2023-01-27
Payer: MEDICARE

## 2023-01-27 DIAGNOSIS — I10 PRIMARY HYPERTENSION: ICD-10-CM

## 2023-01-27 DIAGNOSIS — M62.838 MUSCLE SPASM: ICD-10-CM

## 2023-01-27 DIAGNOSIS — R74.8 ELEVATED ALKALINE PHOSPHATASE LEVEL: ICD-10-CM

## 2023-01-27 DIAGNOSIS — L85.3 DRY SKIN: Primary | ICD-10-CM

## 2023-01-27 DIAGNOSIS — E55.9 VITAMIN D DEFICIENCY: ICD-10-CM

## 2023-01-27 RX ORDER — CYCLOBENZAPRINE HCL 10 MG
10 TABLET ORAL
Qty: 30 TABLET | Refills: 0 | Status: SHIPPED | OUTPATIENT
Start: 2023-01-27

## 2023-01-27 NOTE — PROGRESS NOTES
Chief Complaint   Patient presents with    Arm Pain     Right arm x 1-2 weeks from shoulder to fingers, pain will go away, pt states pain feels muscular      Pt rates arm pain 5/10     1. \"Have you been to the ER, urgent care clinic since your last visit? Hospitalized since your last visit? \" No    2. \"Have you seen or consulted any other health care providers outside of the [address] [place] [address] since your last visit? \" No     3. For patients aged 39-70: Has the patient had a colonoscopy / FIT/ Cologuard? Yes - no Care Gap present      If the patient is female:    4. For patients aged 41-77: Has the patient had a mammogram within the past 2 years? No      5. For patients aged 21-65: Has the patient had a pap smear?  Yes - no Care Gap present

## 2023-02-16 ENCOUNTER — OFFICE VISIT (OUTPATIENT)
Dept: INTERNAL MEDICINE CLINIC | Age: 63
End: 2023-02-16
Payer: MEDICARE

## 2023-02-16 VITALS
SYSTOLIC BLOOD PRESSURE: 121 MMHG | WEIGHT: 204 LBS | BODY MASS INDEX: 30.92 KG/M2 | RESPIRATION RATE: 18 BRPM | DIASTOLIC BLOOD PRESSURE: 86 MMHG | OXYGEN SATURATION: 96 % | HEART RATE: 90 BPM | HEIGHT: 68 IN | TEMPERATURE: 98.3 F

## 2023-02-16 DIAGNOSIS — D86.9 SARCOID: ICD-10-CM

## 2023-02-16 DIAGNOSIS — I10 ESSENTIAL HYPERTENSION: ICD-10-CM

## 2023-02-16 DIAGNOSIS — Z00.00 MEDICARE ANNUAL WELLNESS VISIT, SUBSEQUENT: ICD-10-CM

## 2023-02-16 DIAGNOSIS — Z12.31 SCREENING MAMMOGRAM FOR BREAST CANCER: Primary | ICD-10-CM

## 2023-02-16 NOTE — PROGRESS NOTES
Subjective: (As above and below)     Chief Complaint   Patient presents with    Well Woman     Jt Hudson. Aurea Christie is a 58y.o. year old female who presents for AWV    Hypertension ROS:  taking medications as instructed, no medication side effects noted, no TIAs, no chest pain on exertion, no dyspnea on exertion, no swelling of ankles    Sarcoid: sees pulm, working on re-est elsewhere, feels fair    Mamm:due        Reviewed PmHx, RxHx, FmHx, SocHx, AllgHx and updated in chart. Family History   Problem Relation Age of Onset    Hypertension Mother     Diabetes Mother     Stroke Mother          ~ 65 yo    Heart Disease Father          ~ 80 yo    Gout Father     Breast Cancer Cousin         mat. 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 No pets in the house. Current Outpatient Medications   Medication Sig    amLODIPine (NORVASC) 10 mg tablet TAKE 1 TABLET BY MOUTH EVERY DAY    cholecalciferol, vitD3,/vit K2 (vitamin D3-vitamin K2) 125-90 mcg cap Take  by mouth. cetirizine (ZYRTEC) 10 mg tablet TAKE 1 TABLET BY MOUTH NIGHTLY    albuterol (PROVENTIL HFA, VENTOLIN HFA, PROAIR HFA) 90 mcg/actuation inhaler Take 1 Puff by inhalation every six (6) hours as needed for Wheezing.     ascorbic acid, vitamin C, (VITAMIN C) 500 mg tablet Take  by mouth. FOLIC ACID/MULTIVIT-MIN/LUTEIN (CENTRUM SILVER PO) Take  by mouth. cyclobenzaprine (FLEXERIL) 10 mg tablet Take 1 Tablet by mouth nightly as needed for Muscle Spasm(s). (Patient not taking: Reported on 2/16/2023)    ibuprofen (MOTRIN) 800 mg tablet Take 1 Tablet by mouth every eight (8) hours as needed for Pain. Please call Dr. Carlos Tirado to schedule followup before further refills (Patient not taking: Reported on 2/16/2023)    ipratropium (ATROVENT) 42 mcg (0.06 %) nasal spray 2 (TWO) SPRAY EACH NOSTRIL 2 TIMES DAILY    pantoprazole (PROTONIX) 40 mg tablet Take 40 mg by mouth daily. (Patient not taking: Reported on 1/27/2023)    leflunomide (ARAVA) 20 mg tablet Take 1 Tablet by mouth daily. triamcinolone acetonide (KENALOG) 0.1 % ointment APPLY A THIN LAYER TO AFFECTED AREA TWICE A DAY (Patient not taking: Reported on 2/16/2023)    dextroamphetamine-amphetamine (ADDERALL) 20 mg tablet Take 1 Tablet by mouth two (2) times a day. Max Daily Amount: 40 mg. (Patient not taking: Reported on 2/16/2023)    pregabalin (LYRICA) 75 mg capsule Take 75 mg by mouth two (2) times a day. (Patient not taking: Reported on 2/16/2023)    fluticasone propionate (FLONASE) 50 mcg/actuation nasal spray USE 2 SPRAYS IN EACH NOSTRIL TWICE A DAY (Patient not taking: Reported on 2/16/2023)     No current facility-administered medications for this visit. Review of Systems:   Constitutional:    Negative for fever and chills, negative diaphoresis. HEENT:              Negative for neck pain and stiffness. Eyes:                  Negative for visual disturbance, itching, redness or discharge. Respiratory:        Negative for cough and shortness of breath. Cardiovascular:  Negative for chest pain and palpitations. Gastrointestinal: Negative for nausea, vomiting, abdominal pain, diarrhea or constipation. Genitourinary:     Negative for dysuria and frequency.    Musculoskeletal: Negative for falls, tenderness and swelling. Skin:                    Negative for rash, masses or lesions. Neurological:       Negative for dizzyness, seizure, loss of consciousness, weakness and numbness. Objective:     Vitals:    02/16/23 1301   BP: 121/86   Pulse: 90   Resp: 18   Temp: 98.3 °F (36.8 °C)   TempSrc: Temporal   SpO2: 96%   Weight: 204 lb (92.5 kg)   Height: 5' 8\" (1.727 m)       Results for orders placed or performed in visit on 01/27/23   CBC W/O DIFF   Result Value Ref Range    WBC 3.7 3.4 - 10.8 x10E3/uL    RBC 4.65 3.77 - 5.28 x10E6/uL    HGB 14.6 11.1 - 15.9 g/dL    HCT 42.3 34.0 - 46.6 %    MCV 91 79 - 97 fL    MCH 31.4 26.6 - 33.0 pg    MCHC 34.5 31.5 - 35.7 g/dL    RDW 13.8 11.7 - 15.4 %    PLATELET 751 362 - 934 L29S6/BL   METABOLIC PANEL, BASIC   Result Value Ref Range    Glucose 95 70 - 99 mg/dL    BUN 17 8 - 27 mg/dL    Creatinine 0.87 0.57 - 1.00 mg/dL    eGFR 75 >59 mL/min/1.73    BUN/Creatinine ratio 20 12 - 28    Sodium 140 134 - 144 mmol/L    Potassium 4.8 3.5 - 5.2 mmol/L    Chloride 105 96 - 106 mmol/L    CO2 23 20 - 29 mmol/L    Calcium 9.7 8.7 - 10.3 mg/dL   THYROID CASCADE PROFILE   Result Value Ref Range    TSH 0.624 0.450 - 4.500 uIU/mL   VITAMIN D, 25 HYDROXY   Result Value Ref Range    VITAMIN D, 25-HYDROXY 43.7 30.0 - 100.0 ng/mL   GGT   Result Value Ref Range    GGT 54 0 - 60 IU/L         Gen: Oriented to person, place and time and well-developed, well-nourished and in no distress. HEENT:    Head: normocephalic and atraumatic. Eyes:  EOM are normal. Pupils equal and round. Neck:  Normal range of motion. Neck supple. Cardiovascular: normal rate, regular rhythm and normal heart sounds. Pulmonary/Chest:  Effort normal and breath sounds normal.  No respiratory distress. No wheezes, no rales. Abdominal: soft, normal  bowel sounds. Musculoskeletal:  No edema, no tenderness. No calf tenderness or edema. Neurological:  Alert, oriented to person, place and time. Skin: skin is warm and dry. Assessment/ Plan:       1. Screening mammogram for breast cancer    - [facility] MAMMO BI SCREENING INCL CAD; Future    2. Essential hypertension      3. Sarcoid      4. Medicare annual wellness visit, subsequent        I have discussed the diagnosis with the patient and the intended plan as seen in the above orders. The patient has received an after-visit summary and questions were answered concerning future plans. Pt conveyed understanding of plan. Medication Side Effects and Warnings were discussed with patient: yes  Patient Labs were reviewed: yes  Patient Past Records were reviewed:  yes    Michael Duron. Umair Currie NP    This is the Subsequent Medicare Annual Wellness Exam, performed 12 months or more after the Initial AWV or the last Subsequent AWV    I have reviewed the patient's medical history in detail and updated the computerized patient record. Assessment/Plan   Education and counseling provided:  Are appropriate based on today's review and evaluation    1. Screening mammogram for breast cancer  -     [facility] MAMMO BI SCREENING INCL CAD;  Future     Depression Risk Factor Screening     3 most recent PHQ Screens 2/16/2023   PHQ Not Done -   Little interest or pleasure in doing things Not at all   Feeling down, depressed, irritable, or hopeless Several days   Total Score PHQ 2 1   Trouble falling or staying asleep, or sleeping too much Nearly every day   Feeling tired or having little energy Nearly every day   Poor appetite, weight loss, or overeating Not at all   Feeling bad about yourself - or that you are a failure or have let yourself or your family down Not at all   Trouble concentrating on things such as school, work, reading, or watching TV Nearly every day   Moving or speaking so slowly that other people could have noticed; or the opposite being so fidgety that others notice Not at all   Thoughts of being better off dead, or hurting yourself in some way Not at all PHQ 9 Score 10   How difficult have these problems made it for you to do your work, take care of your home and get along with others Somewhat difficult       Alcohol & Drug Abuse Risk Screen    Do you average more than 1 drink per night or more than 7 drinks a week:  No    On any one occasion in the past three months have you have had more than 3 drinks containing alcohol:  No          Functional Ability and Level of Safety    Hearing: Hearing is good. Activities of Daily Living: The home contains: no safety equipment. Patient does total self care      Ambulation: with no difficulty     Fall Risk:  No flowsheet data found. Abuse Screen:  Patient is not abused       Cognitive Screening    Has your family/caregiver stated any concerns about your memory: no     Cognitive Screening: Normal - MMSE (Mini Mental Status Exam)    Health Maintenance Due     Health Maintenance Due   Topic Date Due    Pneumococcal 0-64 years (1 - PCV) Never done    Shingles Vaccine (1 of 2) Never done    COVID-19 Vaccine (2 - Joaquín risk series) 01/26/2022    Medicare Yearly Exam  Never done    Breast Cancer Screen Mammogram  11/22/2022       Patient Care Team   Patient Care Team:  Kriss Reyes NP as PCP - General (Nurse Practitioner)  Kriss Reyes NP as PCP - [facility] Empaneled Provider  Theodora Santiago MD (Internal Medicine Physician)    History     Patient Active Problem List   Diagnosis Code    Rheumatoid arthritis ([facility] [address] M06.9    Adult ADHD F90.9    Depression F32. A    Hypercholesterolemia E78.00    Essential hypertension with goal blood pressure less than 140/90 I10    Mild intermittent asthma without complication N45.49    Sarcoid D86.9    Overweight (BMI 25.0-29. 9) E66.3    Elevated alkaline phosphatase level R74.8    Recurrent major depressive disorder, in partial remission ([facility] [facility] F33.41    Eczema L30.9    Osteopenia M85.80    H/O: hysterectomy Z90.710    Fibromyalgia M79.7     Past Medical History: Diagnosis Date    Adult ADHD     Calculus of kidney     Depression     Hypercholesterolemia     Hypertension     Rheumatoid arthritis ([facility] [address]     Sarcoid     Sinus Infection       Past Surgical History:   Procedure Laterality Date    HX BREAST BIOPSY      ? left breast 1990- benign- no visible scar    HX MOHS PROCEDURES      HX ORTHOPAEDIC      HX PARTIAL HYSTERECTOMY      29-30 yoa    IR INJ SPINE FLUORO GUIDED  10/16/2020     Current Outpatient Medications   Medication Sig Dispense Refill    amLODIPine (NORVASC) 10 mg tablet TAKE 1 TABLET BY MOUTH EVERY DAY 90 Tablet 1    cholecalciferol, vitD3,/vit K2 (vitamin D3-vitamin K2) 125-90 mcg cap Take  by mouth. cetirizine (ZYRTEC) 10 mg tablet TAKE 1 TABLET BY MOUTH NIGHTLY 90 Tablet 3    albuterol (PROVENTIL HFA, VENTOLIN HFA, PROAIR HFA) 90 mcg/actuation inhaler Take 1 Puff by inhalation every six (6) hours as needed for Wheezing. 1 Inhaler 3    ascorbic acid, vitamin C, (VITAMIN C) 500 mg tablet Take  by mouth. FOLIC ACID/MULTIVIT-MIN/LUTEIN (CENTRUM SILVER PO) Take  by mouth. cyclobenzaprine (FLEXERIL) 10 mg tablet Take 1 Tablet by mouth nightly as needed for Muscle Spasm(s). (Patient not taking: Reported on 2/16/2023) 30 Tablet 0    ibuprofen (MOTRIN) 800 mg tablet Take 1 Tablet by mouth every eight (8) hours as needed for Pain. Please call Dr. Marco Page to schedule followup before further refills (Patient not taking: Reported on 2/16/2023) 90 Tablet 0    ipratropium (ATROVENT) 42 mcg (0.06 %) nasal spray 2 (TWO) SPRAY EACH NOSTRIL 2 TIMES DAILY      pantoprazole (PROTONIX) 40 mg tablet Take 40 mg by mouth daily. (Patient not taking: Reported on 1/27/2023)      leflunomide (ARAVA) 20 mg tablet Take 1 Tablet by mouth daily.  90 Tablet 0    triamcinolone acetonide (KENALOG) 0.1 % ointment APPLY A THIN LAYER TO AFFECTED AREA TWICE A DAY (Patient not taking: Reported on 2/16/2023) 30 g 0    dextroamphetamine-amphetamine (ADDERALL) 20 mg tablet Take 1 Tablet by mouth two (2) times a day. Max Daily Amount: 40 mg. (Patient not taking: Reported on 2023) 60 Tablet 0    pregabalin (LYRICA) 75 mg capsule Take 75 mg by mouth two (2) times a day. (Patient not taking: Reported on 2023)      fluticasone propionate (FLONASE) 50 mcg/actuation nasal spray USE 2 SPRAYS IN EACH NOSTRIL TWICE A DAY (Patient not taking: Reported on 2023)  6     Allergies   Allergen Reactions    Latex Rash    Iodine Rash and Other (comments)     Pt states this is due to shellfish allergy. She's not aware, she's allergic to iodine or IV contrast.     Lisinopril Cough    Shellfish Derived Hives    Simvastatin Myalgia       Family History   Problem Relation Age of Onset    Hypertension Mother     Diabetes Mother     Stroke Mother          ~ 65 yo    Heart Disease Father          ~ 78 yo    Gout Father     Breast Cancer Cousin         mat. 1st cousin     Social History     Tobacco Use    Smoking status: Never    Smokeless tobacco: Never   Substance Use Topics    Alcohol use: No         Rica Penn NP

## 2023-02-16 NOTE — PROGRESS NOTES
Chief Complaint   Patient presents with    Well Woman       1. \"Have you been to the ER, urgent care clinic since your last visit? Hospitalized since your last visit? \" No    2. \"Have you seen or consulted any other health care providers outside of the [address] [place] [address] since your last visit? \" No     3. For patients aged 39-70: Has the patient had a colonoscopy / FIT/ Cologuard? Yes - no Care Gap present      If the patient is female:    4. For patients aged 41-77: Has the patient had a mammogram within the past 2 years? No      5. For patients aged 21-65: Has the patient had a pap smear?  Yes - no Care Gap present

## 2023-02-23 ENCOUNTER — HOSPITAL ENCOUNTER (OUTPATIENT)
Dept: MAMMOGRAPHY | Age: 63
Discharge: HOME | End: 2023-02-23
Attending: NURSE PRACTITIONER
Payer: MEDICARE

## 2023-02-23 DIAGNOSIS — Z12.31 SCREENING MAMMOGRAM FOR BREAST CANCER: ICD-10-CM

## 2023-02-23 PROCEDURE — 77063 BREAST TOMOSYNTHESIS BI: CPT

## 2023-02-27 ENCOUNTER — TRANSCRIBE ORDER (OUTPATIENT)
Dept: SCHEDULING | Age: 63
End: 2023-02-27

## 2023-02-27 DIAGNOSIS — D86.9 SARCOIDOSIS: Primary | ICD-10-CM

## 2023-03-02 ENCOUNTER — PATIENT MESSAGE (OUTPATIENT)
Dept: RHEUMATOLOGY | Age: 63
End: 2023-03-02

## 2023-03-07 NOTE — TELEPHONE ENCOUNTER
Jayleen Richardson RN 3/6/2023 3:17 PM EST    Deferring to you  ----- Message -----  From: Keanu Maradiaga: 3/2/2023 11:59 AM EST  To: Myra Preciado, RN, Rigoberto Barbour MD  Subject: Logan Marion: Question regarding QUANTIFERON-TB GOLD P*    Please see the following question patient had regarding Negative TB test.  ----- Message -----  From: Juan Part  Sent: 3/2/2023 11:32 AM EST  To: [facility] Nurse Pool  Subject: Question regarding QUANTIFERON-TB GOLD PLUS     Korin Davies Magalysin. After reviewing my labs am I understanding that the test is negative and there is no signs or history of TB or exposure? If this is true do I need to do something else to be absolutely positive of this diagnosis? The first lab was I guess positive,the second ,negative. I want to be 100%sure and have the right info on my medical records . Look forward to seeing you on my next appointment.

## 2023-03-09 ENCOUNTER — HOSPITAL ENCOUNTER (OUTPATIENT)
Dept: CT IMAGING | Age: 63
Discharge: HOME | End: 2023-03-09
Attending: INTERNAL MEDICINE
Payer: MEDICARE

## 2023-03-09 DIAGNOSIS — D86.9 SARCOIDOSIS: ICD-10-CM

## 2023-03-09 PROCEDURE — 71250 CT THORAX DX C-: CPT

## 2023-03-16 ENCOUNTER — OFFICE VISIT (OUTPATIENT)
Dept: RHEUMATOLOGY | Age: 63
End: 2023-03-16

## 2023-03-16 VITALS
TEMPERATURE: 97 F | SYSTOLIC BLOOD PRESSURE: 131 MMHG | BODY MASS INDEX: 31.5 KG/M2 | WEIGHT: 207.2 LBS | HEART RATE: 83 BPM | OXYGEN SATURATION: 94 % | DIASTOLIC BLOOD PRESSURE: 89 MMHG | RESPIRATION RATE: 18 BRPM

## 2023-03-16 DIAGNOSIS — M67.911 TENDINOPATHY OF RIGHT ROTATOR CUFF: ICD-10-CM

## 2023-03-16 DIAGNOSIS — D86.9 SARCOIDOSIS: Primary | ICD-10-CM

## 2023-03-16 DIAGNOSIS — M19.90 CHRONIC INFLAMMATORY ARTHRITIS: ICD-10-CM

## 2023-03-16 NOTE — PROGRESS NOTES
Chief Complaint   Patient presents with    Joint Pain     1. Have you been to the ER, urgent care clinic since your last visit? Hospitalized since your last visit? No    2. Have you seen or consulted any other health care providers outside of the [address] [place] [address] since your last visit? Include any pap smears or colon screening.  No

## 2023-03-16 NOTE — PATIENT INSTRUCTIONS
For now, continue topical diclofenac gel, topical Bengay, and heating pads as needed to the right shoulder. Ibuprofen 800mg 3x/day will also help this improve more quickly, just stay hydrated while you're taking this much prednisone. If the right shoulder worsens, let us know and we can bring you in for a shoulder injection and/or send you for a physical therapy referral with xrays. In the meantime keep doing the range of motion exercises we discussed. Keep following with your PCP and lung doctor as needed for joint pain or breathing issues.

## 2023-03-16 NOTE — PROGRESS NOTES
REASON FOR VISIT:    is a 58 y.o. female with history of rheumatoid arthritis and sarcoidosis who is returning for Rheumatology followup. HISTORY OF PRESENT ILLNESS    Pt returns for a follow up. Had taken leflunomide for over a month, felt it made her hurt in most of her joints, despite taking it for at least a month. For about the last month, has been having more right arm pain. Had been taking motrin leading up to that but stopped due to diarrhea with this. Today is a particularly bad day. . pain is mostly in the right shoulder, but can have sharp pains radiating down into the right hand. Some swelling but overall very little. Some tenderness extends into proximal forearms. No neck pain recently. Had previously     [facility] for chest CT on 3/9, seemed to irritate both shoulders further. Has been taking ibuprofen 800mg TID as needed. Pt started Leflunomide since her last visit, and she took it for 30 days. She says that she did not like it, and she had more pain after she started it. She says that this medication disrupted her bowel movements. She has been taking 800mg Ibuprofen for pain, which helps. She notes that she does not take Ibuprofen every day, only when she has bad pain. Pt says that her left basilar thumb pain is great enough that she has trouble opening things. She also has left anterolateral that has worsened since her last visit. She got some lasting pain relief from her last finger injection. She does not want to get another injection because she said that the injection was very painful for days after her last injection, even though ultimately it helped for months. Pt picked up the isoniazid but did not start it. Pt says that she doesn't feel she has a good rapport with Dr. Catalina Pak, looking for a new Pulmonologist. Pt coughs occasionally. She notes that she coughs more towards the evening. She thinks that this is caused by the congestion she has from allergies. She feels she is gradually getting more dyspneic with walking up a flight of stairs. She notes that sometimes she can \"fly\" up her 16 steps but sometimes she is out of breath at the top of the stairs. Pt does not plan on going back to see the allergist again. She wants to manage her allergies without taking shots. She takes Zyrtec and Flonase to keep her allergies under control. Pt says that she has been able to keep her weight steady and she is slowly losing weight. Pt says that she has some acid reflux with certain foods. She has Protonix, which she takes inconsistently. No GI upset with ibuprofen, no dark or bloody stools. Disease History:  Initial visit 8/6/21. In the 1980's, over about 1 month developed polyarticular pain and swelling moving from feet to knees to elbows, treated with gold injections and she thinks methotrexate early on though not in the last decade. Now, getting gradually progressive \"aches and pains. \" Right foot, left foot, under arch of righ t foot, both knees, right > left hand with some dorsal hand swelling, bilateral elbows, low back and hips. Joint pains worsen together. Hands feel tight. Early morning and mid-evening are the worst.   She was having intermittent chest pains around 1993, went to [facility] and identified \"lung spot\", diagnosed with pulmonary sarcoidosis. Has been on recurrent prednisone tapers for lung flares, though last taper was for back pain last November. Now following with Dr. Joy Carroll, uses inhalers. Recently started CPAP for ANITA. Has a mild residual nocturnal-predominant cough she attributes to allergies after discovery of multiple environmental allergies. Has been diagnosed with nephrolithiasis she thinks diagnosed on workup of dysuria roughly 3 years ago. Has seen Dr. Marianne Hogan who prescribed 100mg bid Lyrica which has been helpful; had done ELZBIETA's L4-S1. MRI showed moderate L4-5 central stenosis with L4-5 and L5-S1 neural foraminal stenoses.  Pain now stays in low back and hips, but with prolonged sitting or standing gets radiation up and down spine. Taking ibuprofen 800mg, which takes the edge off, takes it 2-3x/day now. Had been referred to Dr. Lexy Adams in Rheumatology with whom she was prescribed Plaquenil (hydroxychloroquine), 200mg bid for about 2 weeks, was having stomach upset and concern for side effects so self-discontinued. REVIEW OF SYSTEMS  A comprehensive review of systems was negative except for that written in the HPI. A 10-point review of systems is per the new patient questionnaire, which has been reviewed extensively and scanned into the electronic medical record for future reference. Review of systems is as above and is otherwise negative. ALLERGIES  Latex, Iodine, Lisinopril, Shellfish derived, and Simvastatin    MEDICATIONS  Current Outpatient Medications   Medication Sig    amLODIPine (NORVASC) 10 mg tablet TAKE 1 TABLET BY MOUTH EVERY DAY    ipratropium (ATROVENT) 42 mcg (0.06 %) nasal spray 2 (TWO) SPRAY EACH NOSTRIL 2 TIMES DAILY    triamcinolone acetonide (KENALOG) 0.1 % ointment APPLY A THIN LAYER TO AFFECTED AREA TWICE A DAY    cholecalciferol, vitD3,/vit K2 (vitamin D3-vitamin K2) 125-90 mcg cap Take  by mouth. cetirizine (ZYRTEC) 10 mg tablet TAKE 1 TABLET BY MOUTH NIGHTLY    albuterol (PROVENTIL HFA, VENTOLIN HFA, PROAIR HFA) 90 mcg/actuation inhaler Take 1 Puff by inhalation every six (6) hours as needed for Wheezing. ascorbic acid, vitamin C, (VITAMIN C) 500 mg tablet Take  by mouth. leflunomide (ARAVA) 20 mg tablet Take 1 Tablet by mouth daily. (Patient not taking: Reported on 1/54/3512)    FOLIC ACID/MULTIVIT-MIN/LUTEIN (CENTRUM SILVER PO) Take  by mouth. (Patient not taking: Reported on 3/16/2023)     No current facility-administered medications for this visit.        PAST MEDICAL HISTORY  Past Medical History:   Diagnosis Date    Adult ADHD     Calculus of kidney     Depression     Hypercholesterolemia Hypertension     Rheumatoid arthritis ([facility] [address]     Sarcoid     Sinus Infection        FAMILY HISTORY  family history includes Breast Cancer in her cousin; Diabetes in her mother; Gout in her father; Heart Disease in her father; Hypertension in her mother; Stroke in her mother. SOCIAL HISTORY  She  reports that she has never smoked. She has never used smokeless tobacco. She reports that she does not drink alcohol and does not use drugs. Social History     Social History Narrative    Living with son (28) and son's father. No pets in the house. 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 DATA  Visit Vitals  /89 (BP 1 Location: Left upper arm, BP Patient Position: Sitting, BP Cuff Size: Large adult)   Pulse 83   Temp 97 °F (36.1 °C) (Temporal)   Resp 18   Wt 207 lb 3.2 oz (94 kg)   SpO2 94%   BMI 31.50 kg/m²       General:  The patient is well developed, well nourished, alert, and in no apparent distress. Eyes: Sclera are anicteric. No conjunctival injection. HEENT:  Oropharynx is clear. No oral ulcers. Adequate salivary pooling. No cervical or supraclavicular lymphadenopathy. Lungs:  Clear to auscultation bilaterally, without wheeze or stridor. Normal respiratory effort. Cor:  Regular rate and rhythm. No murmur rub or gallop. Abdomen: Soft, non-tender, without hepatomegaly or masses. Extremities: No calf tenderness, trace B LE edema distal 1/3 legs. Warm and well perfused. Skin:  Interval resolved nodule on right upper arm, resolved foot plaque. Neuro: Nonfocal, no foot or wrist drop  Musculoskeletal:    A comprehensive musculoskeletal exam was performed for all joints of each upper and lower extremity and assessed for swelling, tenderness and range of motion. Results are documented as below:  No evidence of synovitis in the small joints of the hands, wrists, shoulders, elbows, hips, knees or ankles. Left >> right CMC tenderness without ashley synovitis.   No paralumbar tenderness today  No ankle synovitis. Left anterior joint line tenderness of ankle extending toward anterolateral proximal dorsal foot, interval resolved MTP squeeze tenderness. Labs:  8/18/22: Ferririn 333, T3 free 3, T4 free 1.24, TSH 0.424, Cr 0.91, WBC 3.4, QuantiFERON plus pos, bilirubin direct <0.10, ESR 20, Cr 0.86, Tbili 0.3, AST 16, ALT 12, Alk phos 165, CBC WNL, CRP 2 mg/L  2/21/22: LASHON speckled pattern 1:80, Calcitrol 74.8, Hep B evaluation neg, CCP Ab IgG/IgA 7, Rheum factor <14, LASHON by IFA (RDL) pos, ESR 27, Cr 1.04, Alk phos 160, AST 24, ALT 19, Tbili 0.5, WBC 2.7, HGB 14.6, Plt 272, CRP 2 mg/L  6/9/21: TSH WNL; GGT 55 (WNL), Vit D 25.9;   4/22/21: Ca 10.2, Cr 0.88, Tbili 0.5, AST 19, ALT 9, AlkP 153; HDL 45, ; WBC 3.2 (no diff), Hgb 14.7, Plt 261      Imaging:  CT CHEST WO CONT (7/12/22): 1. The mediastinal adenopathy is stable. The pulmonary nodules are stable. No new pulmonary nodules are noted. DEXA BONE DENSITY (3/1/22): Femoral Neck Left:  Bone mineral density (gm/cm2):  0.753 g/cm?  % of peak bone mass:  73%  % for age matched controls:  71%  T-score:  -2.1  Z-score:  -2.2     Femoral Neck Right:  Bone mineral density (gm/cm2):  0.700 g/cm?  % of peak bone mass:  67%  % for age matched controls:  66%  T-score:  -2.4  Z-score:  -2.6     Total Hip Left:  Bone mineral density (gm/cm2):  0.783 g/cm?  % of peak bone mass:  78%  % for age matched controls:  73%  T-score:  -1.8  Z-score:  -2.3     Total Hip Right:  Bone mineral density (gm/cm2):  0.718 g/cm?  % of peak bone mass:  71%  % for age matched controls:  67%  T-score:  -2.3  Z-score:  -2.8     Lumbar Spine:  L1-4 or specify  Bone mineral density (gm/cm2):  1.074 g/cm?  % of peak bone mass:  90%  % for age matched controls:  88%  T-score:  -1.0  Z-score:  -1.2    12/8/21 CT chest without:  THYROID: No nodule.   MEDIASTINUM: The mildly enlarged mediastinal lymph nodes in the right  paratracheal region, pretracheal region subcarinal region and left prevascular  space are unchanged. Mireya Luster CANDICE: The mildly enlarged hilar lymph nodes are unchanged. Evaluation is  somewhat difficult due to lack of intravenous contrast material but there has been no change. .  THORACIC AORTA: No aneurysm. MAIN PULMONARY ARTERY: Normal in caliber. TRACHEA/BRONCHI: Patent. ESOPHAGUS: No wall thickening or dilatation. HEART: Normal in size. PLEURA: No effusion or pneumothorax. LUNGS: The bilateral pulmonary nodules are unchanged in size and appearance dating back to 5/4/2019. Mild nodular thickening in the plane of the major fissures is unchanged as well. Linear areas of scarring posteriorly in each lower lobe are stable. Scarring adjacent to the thoracic spine osteophytes in the right lower lobe is stable. . INCIDENTALLY IMAGED UPPER ABDOMEN: The left renal cyst is imaged. No further evaluation is necessary. Mireya Luster BONES: No destructive bone lesion. IMPRESSION  1. The adenopathy is stable dating back to 5/4/2019. Additionally, the pulmonary nodules are stable as well. No new nodules are noted. No nodules have increased in size. 5/17/21: Chest CT: IMPRESSION  Stable bilateral pulmonary nodules, hilar and mediastinal lymphadenopathy compared to May 2019.    9/24/20 MR Lspine: Moderate L4-5 central stenosis with mild to moderate left and mild right neural foraminal narrowing. Small inferior disc extrusion at L5-S1 with moderate left and mild right neural foraminal stenosis. 6/27/19 DXA:  This patient is osteopenic using the World Health Organization criteria  10 year probability of major osteoporotic fracture: 5.8%  10 year probability of hip fracture: 0.9%        ASSESSMENT AND PLAN  Ms. Dorene Zamora is a 58 y.o. female with history of rheumatoid arthritis and sarcoidosis with now primarily degenerative-character arthralgias in the [facility] and left ankle. Insufficient trial of leflunomide, but no current inflammatory targets for this so reasonable to remain off of it.  Reviewed cautious ibuprofen use, tylenol to help space dosing. Given her reticence to start LTBI treatment and multiple recent false positives, will first recheck QFN, low enough suspicion that holding treatment would be reasonable if negative on repeat. 1. Chronic inflammatory arthritis  - leflunomide (ARAVA) 20 mg tablet; Take 1 Tablet by mouth daily. Take half tab daily for 7 days and then one tab daily if tolerated  Dispense: 30 Tablet; Refill: 2  - HEPATIC FUNCTION PANEL; Future  - QUANTIFERON-TB GOLD PLUS; Future  - HEPATIC FUNCTION PANEL  - QUANTIFERON-TB GOLD PLUS  - C REACTIVE PROTEIN, QT; Future  - CBC WITH AUTOMATED DIFF; Future  - METABOLIC PANEL, COMPREHENSIVE; Future  - SED RATE (ESR); Future    2. Sarcoidosis  - leflunomide (ARAVA) 20 mg tablet; Take 1 Tablet by mouth daily. Take half tab daily for 7 days and then one tab daily if tolerated  Dispense: 30 Tablet; Refill: 2  - HEPATIC FUNCTION PANEL; Future  - QUANTIFERON-TB GOLD PLUS; Future  - HEPATIC FUNCTION PANEL  - QUANTIFERON-TB GOLD PLUS  - C REACTIVE PROTEIN, QT; Future  - CBC WITH AUTOMATED DIFF; Future  - METABOLIC PANEL, COMPREHENSIVE; Future  - SED RATE (ESR); Future  - PFT COMPLETE; Future    3. Immunosuppression ([address]  - HEPATIC FUNCTION PANEL; Future  - QUANTIFERON-TB GOLD PLUS; Future  - C REACTIVE PROTEIN, QT; Future  - CBC WITH AUTOMATED DIFF; Future  - METABOLIC PANEL, COMPREHENSIVE; Future  - SED RATE (ESR); Future  - PFT COMPLETE; Future       There are no Patient Instructions on file for this visit. No orders of the defined types were placed in this encounter. Medications: I am having Steuben Teresa. Ravindra maintain her FOLIC ACID/MULTIVIT-MIN/LUTEIN (CENTRUM SILVER PO), ascorbic acid (vitamin C), albuterol, cetirizine, vitamin D3-vitamin K2, triamcinolone acetonide, leflunomide, ipratropium, and amLODIPine. Follow up: No follow-ups on file.     Face to face time: 40 minutes  Note preparation and records review day of service: 29 minutes  Total provider time day of service: 69 minutes    This was scribed by Jeremy Ferreira in the presence of Dr. Misael Leija. The note was reviewed and amended personally, and I agree with the above information.     Brayan Richardson MD    Adult Rheumatology   [facility]  A Part of [facility], [address] [place] [address]   Phone [phone]  Fax [phone]

## 2023-04-05 RX ORDER — CYCLOBENZAPRINE HCL 10 MG
10 TABLET ORAL
Qty: 30 TABLET | Refills: 0
Start: 2023-04-05

## 2023-04-21 DIAGNOSIS — J32.9 CHRONIC SINUSITIS, UNSPECIFIED LOCATION: Primary | ICD-10-CM

## 2023-04-29 DIAGNOSIS — I10 ESSENTIAL HYPERTENSION WITH GOAL BLOOD PRESSURE LESS THAN 140/90: ICD-10-CM

## 2023-04-30 RX ORDER — AMLODIPINE BESYLATE 10 MG/1
TABLET ORAL
Qty: 90 TABLET | Refills: 1 | Status: SHIPPED | OUTPATIENT
Start: 2023-04-30

## 2023-05-07 DIAGNOSIS — M62.838 OTHER MUSCLE SPASM: ICD-10-CM

## 2023-05-07 DIAGNOSIS — D86.9 SARCOIDOSIS, UNSPECIFIED: ICD-10-CM

## 2023-05-07 DIAGNOSIS — M19.90 UNSPECIFIED OSTEOARTHRITIS, UNSPECIFIED SITE: ICD-10-CM

## 2023-05-09 RX ORDER — CYCLOBENZAPRINE HCL 10 MG
10 TABLET ORAL
COMMUNITY
Start: 2023-04-07 | End: 2023-05-09 | Stop reason: SDUPTHER

## 2023-05-09 RX ORDER — CYCLOBENZAPRINE HCL 10 MG
TABLET ORAL
Qty: 30 TABLET | OUTPATIENT
Start: 2023-05-09

## 2023-05-09 RX ORDER — IBUPROFEN 800 MG/1
TABLET ORAL
Qty: 90 TABLET | Refills: 1 | Status: SHIPPED | OUTPATIENT
Start: 2023-05-09

## 2023-05-09 RX ORDER — CYCLOBENZAPRINE HCL 10 MG
10 TABLET ORAL
Qty: 30 TABLET | Refills: 1 | Status: SHIPPED | OUTPATIENT
Start: 2023-05-09

## 2023-06-02 ENCOUNTER — TELEMEDICINE (OUTPATIENT)
Facility: CLINIC | Age: 63
End: 2023-06-02
Payer: MEDICARE

## 2023-06-02 DIAGNOSIS — H65.191 OTHER NON-RECURRENT ACUTE NONSUPPURATIVE OTITIS MEDIA OF RIGHT EAR: Primary | ICD-10-CM

## 2023-06-02 DIAGNOSIS — D86.9 SARCOID: ICD-10-CM

## 2023-06-02 DIAGNOSIS — M79.601 RIGHT ARM PAIN: ICD-10-CM

## 2023-06-02 PROCEDURE — 99213 OFFICE O/P EST LOW 20 MIN: CPT | Performed by: NURSE PRACTITIONER

## 2023-06-02 RX ORDER — FLUTICASONE FUROATE, UMECLIDINIUM BROMIDE AND VILANTEROL TRIFENATATE 100; 62.5; 25 UG/1; UG/1; UG/1
POWDER RESPIRATORY (INHALATION)
COMMUNITY
Start: 2023-03-27

## 2023-06-02 RX ORDER — DOXYCYCLINE HYCLATE 100 MG
100 TABLET ORAL 2 TIMES DAILY
Qty: 14 TABLET | Refills: 0 | Status: SHIPPED | OUTPATIENT
Start: 2023-06-02 | End: 2023-06-09

## 2023-06-02 SDOH — ECONOMIC STABILITY: FOOD INSECURITY: WITHIN THE PAST 12 MONTHS, YOU WORRIED THAT YOUR FOOD WOULD RUN OUT BEFORE YOU GOT MONEY TO BUY MORE.: NEVER TRUE

## 2023-06-02 SDOH — ECONOMIC STABILITY: INCOME INSECURITY: HOW HARD IS IT FOR YOU TO PAY FOR THE VERY BASICS LIKE FOOD, HOUSING, MEDICAL CARE, AND HEATING?: SOMEWHAT HARD

## 2023-06-02 SDOH — ECONOMIC STABILITY: HOUSING INSECURITY
IN THE LAST 12 MONTHS, WAS THERE A TIME WHEN YOU DID NOT HAVE A STEADY PLACE TO SLEEP OR SLEPT IN A SHELTER (INCLUDING NOW)?: NO

## 2023-06-02 SDOH — ECONOMIC STABILITY: FOOD INSECURITY: WITHIN THE PAST 12 MONTHS, THE FOOD YOU BOUGHT JUST DIDN'T LAST AND YOU DIDN'T HAVE MONEY TO GET MORE.: NEVER TRUE

## 2023-06-02 ASSESSMENT — PATIENT HEALTH QUESTIONNAIRE - PHQ9
SUM OF ALL RESPONSES TO PHQ QUESTIONS 1-9: 0
1. LITTLE INTEREST OR PLEASURE IN DOING THINGS: 0
2. FEELING DOWN, DEPRESSED OR HOPELESS: 0
SUM OF ALL RESPONSES TO PHQ9 QUESTIONS 1 & 2: 0
SUM OF ALL RESPONSES TO PHQ QUESTIONS 1-9: 0

## 2023-06-02 NOTE — PROGRESS NOTES
Identified pt with two pt identifiers(name and ). Reviewed record in preparation for visit and have obtained necessary documentation. All patient medications has been reviewed. Chief Complaint   Patient presents with    Otalgia    Nasal Congestion    Pharyngitis         No flowsheet data found. Wt Readings from Last 3 Encounters:   23 207 lb 3.2 oz (94 kg)   23 204 lb (92.5 kg)   10/20/22 197 lb (89.4 kg)     Temp Readings from Last 3 Encounters:   No data found for Temp     BP Readings from Last 3 Encounters:   23 131/89   23 121/86   10/20/22 127/86     Pulse Readings from Last 3 Encounters:   23 83   23 90   10/20/22 72         Coordination of Care Questionnaire:     1. Have you been to the ER, urgent care clinic since your last visit? Hospitalized since your last visit? No    2. Have you seen or consulted any other health care providers outside of the [address] [place] [address] since your last visit? Include any pap smears or colon screening.  No
-    Neck: [x] No visualized mass [] Abnormal -     Pulmonary/Chest: [x] Respiratory effort normal   [x] No visualized signs of difficulty breathing or respiratory distress        [] Abnormal -      Musculoskeletal:   [x] Normal gait with no signs of ataxia         [x] Normal range of motion of neck        [] Abnormal -     Neurological:        [x] No Facial Asymmetry (Cranial nerve 7 motor function) (limited exam due to video visit)          [x] No gaze palsy        [] Abnormal -          Skin:        [x] No significant exanthematous lesions or discoloration noted on facial skin         [] Abnormal -            Psychiatric:       [x] Normal Affect [] Abnormal -        [x] No Hallucinations    Other pertinent observable physical exam findings:-         On this date 6/2/2023 I have spent 5 minutes reviewing previous notes, test results and face to face (virtual) with the patient discussing the diagnosis and importance of compliance with the treatment plan as well as documenting on the day of the visit. Jennie Form, was evaluated through a synchronous (real-time) audio-video encounter. The patient (or guardian if applicable) is aware that this is a billable service, which includes applicable co-pays. This Virtual Visit was conducted with patient's (and/or legal guardian's) consent. Patient identification was verified, and a caregiver was present when appropriate.    The patient was located at Home: [phone]  Provider was located at Home ([facility]): [place]         --MCKAYLA Norris - NP

## 2023-06-07 ENCOUNTER — OFFICE VISIT (OUTPATIENT)
Facility: CLINIC | Age: 63
End: 2023-06-07
Payer: MEDICARE

## 2023-06-07 VITALS
HEIGHT: 68 IN | HEART RATE: 71 BPM | OXYGEN SATURATION: 98 % | TEMPERATURE: 98.1 F | RESPIRATION RATE: 17 BRPM | WEIGHT: 205.8 LBS | SYSTOLIC BLOOD PRESSURE: 118 MMHG | BODY MASS INDEX: 31.19 KG/M2 | DIASTOLIC BLOOD PRESSURE: 75 MMHG

## 2023-06-07 DIAGNOSIS — H65.191 OTHER NON-RECURRENT ACUTE NONSUPPURATIVE OTITIS MEDIA OF RIGHT EAR: ICD-10-CM

## 2023-06-07 PROCEDURE — 99213 OFFICE O/P EST LOW 20 MIN: CPT | Performed by: NURSE PRACTITIONER

## 2023-06-07 PROCEDURE — 3078F DIAST BP <80 MM HG: CPT | Performed by: NURSE PRACTITIONER

## 2023-06-07 PROCEDURE — 3074F SYST BP LT 130 MM HG: CPT | Performed by: NURSE PRACTITIONER

## 2023-06-07 RX ORDER — DICLOFENAC SODIUM 75 MG/1
75 TABLET, DELAYED RELEASE ORAL 2 TIMES DAILY PRN
COMMUNITY
Start: 2022-03-10

## 2023-06-07 RX ORDER — MONTELUKAST SODIUM 10 MG/1
TABLET ORAL
COMMUNITY
Start: 2023-03-27

## 2023-06-07 RX ORDER — FLUTICASONE PROPIONATE 110 UG/1
2 AEROSOL, METERED RESPIRATORY (INHALATION) 2 TIMES DAILY
COMMUNITY
Start: 2020-08-22

## 2023-06-07 RX ORDER — FLUTICASONE PROPIONATE 50 MCG
SPRAY, SUSPENSION (ML) NASAL
COMMUNITY
Start: 2023-05-24

## 2023-06-07 RX ORDER — DOXYCYCLINE HYCLATE 100 MG
100 TABLET ORAL 2 TIMES DAILY
Qty: 6 TABLET | Refills: 0 | Status: SHIPPED | OUTPATIENT
Start: 2023-06-07 | End: 2023-06-10

## 2023-06-07 ASSESSMENT — PATIENT HEALTH QUESTIONNAIRE - PHQ9
SUM OF ALL RESPONSES TO PHQ9 QUESTIONS 1 & 2: 0
SUM OF ALL RESPONSES TO PHQ QUESTIONS 1-9: 0
2. FEELING DOWN, DEPRESSED OR HOPELESS: 0
1. LITTLE INTEREST OR PLEASURE IN DOING THINGS: 0
SUM OF ALL RESPONSES TO PHQ QUESTIONS 1-9: 0

## 2023-06-07 NOTE — PROGRESS NOTES
Rm    Chief Complaint   Patient presents with    Otitis Media        /75 (Site: Left Upper Arm, Position: Sitting, Cuff Size: Medium Adult)   Pulse 71   Temp 98.1 °F (36.7 °C) (Temporal)   Resp 17   Ht 5' 8\" (1.727 m)   Wt 205 lb 12.8 oz (93.4 kg)   SpO2 98%   BMI 31.29 kg/m²      1. Have you been to the ER, urgent care clinic since your last visit? Hospitalized since your last visit? no    2. Have you seen or consulted any other health care providers outside of the [address] [place] [address] since your last visit? Include any pap smears or colon screening. no    Health Maintenance Due   Topic Date Due    Pneumococcal 0-64 years Vaccine (1 - PCV) Never done    HIV screen  Never done    Shingles vaccine (1 of 2) Never done    COVID-19 Vaccine (3 - Booster for Miracle series) 02/23/2022        No flowsheet data found. No flowsheet data found. No flowsheet data found.

## 2023-06-07 NOTE — PROGRESS NOTES
Subjective: (As above and below)     Chief Complaint   Patient presents with    Otitis Media     Sydell Em is a 61y.o. year old female who presents for     In person follow up for ear pain- seen virtually on  for R sided ear pain s/s consistent w/ infxn  Rxd'd doxy, tried to do VV yesterday for continued ear pain but now bilat- changed to in person visit today  No fevers  She is on day 6 of doxy  She feels a bit better than yesterday  Still having R>L ear pain  Post nasal drainage, sinus pain is much improved      Reviewed PmHx, RxHx, FmHx, SocHx, AllgHx and updated in chart. Family History   Problem Relation Age of Onset    Heart Disease Father 1             Stroke Mother 1             Diabetes Mother     Hypertension Mother     Gout Father     Breast Cancer Cousin         mat. 1st cousin       Past Medical History:   Diagnosis Date    Adult ADHD     Calculus of kidney     Depression     Hypercholesterolemia     Hypertension     Rheumatoid arthritis ([facility] [address]     Sarcoid       Social History     Socioeconomic History    Marital status: Single     Spouse name: None    Number of children: None    Years of education: None    Highest education level: None   Tobacco Use    Smoking status: Never    Smokeless tobacco: Never   Substance and Sexual Activity    Alcohol use: No    Drug use: No   Social History Narrative    Living with son (28) and son's father. No pets in the house.       Social Determinants of Health     Financial Resource Strain: Medium Risk    Difficulty of Paying Living Expenses: Somewhat hard   Food Insecurity: No Food Insecurity    Worried About Running Out of Food in the Last Year: Never true    Ran Out of Food in the Last Year: Never true   Transportation Needs: Unmet Transportation Needs    Lack of Transportation (Non-Medical): Yes   Housing Stability: Unknown    Unstable Housing in the Last Year: No          Current Outpatient Medications   Medication Sig    cyanocobalamin 1000

## 2023-06-13 ENCOUNTER — TELEPHONE (OUTPATIENT)
Facility: CLINIC | Age: 63
End: 2023-06-13

## 2023-06-13 NOTE — TELEPHONE ENCOUNTER
Patient called stating that she has finish all the antibiotic and is still having problems with her right ear so she made an appt to see a dentist tomorrow

## 2023-06-26 ENCOUNTER — NURSE TRIAGE (OUTPATIENT)
Dept: OTHER | Facility: CLINIC | Age: 63
End: 2023-06-26

## 2023-06-27 ENCOUNTER — TELEMEDICINE (OUTPATIENT)
Facility: CLINIC | Age: 63
End: 2023-06-27
Payer: MEDICARE

## 2023-06-27 DIAGNOSIS — H92.03 OTALGIA OF BOTH EARS: ICD-10-CM

## 2023-06-27 DIAGNOSIS — J02.8 PHARYNGITIS DUE TO OTHER ORGANISM: Primary | ICD-10-CM

## 2023-06-27 PROCEDURE — 99213 OFFICE O/P EST LOW 20 MIN: CPT | Performed by: NURSE PRACTITIONER

## 2023-06-27 RX ORDER — PREDNISONE 10 MG/1
TABLET ORAL
Qty: 1 EACH | Refills: 0 | Status: SHIPPED | OUTPATIENT
Start: 2023-06-27

## 2023-07-13 RX ORDER — CYCLOBENZAPRINE HCL 10 MG
10 TABLET ORAL
Qty: 30 TABLET | Refills: 1 | Status: SHIPPED | OUTPATIENT
Start: 2023-07-13

## 2023-07-14 RX ORDER — CYCLOBENZAPRINE HCL 10 MG
10 TABLET ORAL
Qty: 30 TABLET | Refills: 1 | OUTPATIENT
Start: 2023-07-14

## 2023-09-16 DIAGNOSIS — J45.20 MILD INTERMITTENT ASTHMA, UNCOMPLICATED: ICD-10-CM

## 2023-09-16 DIAGNOSIS — J30.2 OTHER SEASONAL ALLERGIC RHINITIS: ICD-10-CM

## 2023-09-16 DIAGNOSIS — I10 ESSENTIAL (PRIMARY) HYPERTENSION: ICD-10-CM

## 2023-09-18 RX ORDER — CETIRIZINE HYDROCHLORIDE 10 MG/1
TABLET ORAL
Qty: 90 TABLET | Refills: 3 | OUTPATIENT
Start: 2023-09-18

## 2023-09-18 RX ORDER — CYCLOBENZAPRINE HCL 10 MG
10 TABLET ORAL
Qty: 30 TABLET | Refills: 1 | OUTPATIENT
Start: 2023-09-18

## 2023-09-18 RX ORDER — CETIRIZINE HYDROCHLORIDE 10 MG/1
10 TABLET ORAL NIGHTLY
Qty: 90 TABLET | Refills: 0 | Status: SHIPPED | OUTPATIENT
Start: 2023-09-18

## 2023-09-18 RX ORDER — CYCLOBENZAPRINE HCL 10 MG
10 TABLET ORAL
Qty: 30 TABLET | Refills: 1 | Status: SHIPPED | OUTPATIENT
Start: 2023-09-18

## 2023-09-18 RX ORDER — AMLODIPINE BESYLATE 10 MG/1
TABLET ORAL
Qty: 90 TABLET | Refills: 1 | OUTPATIENT
Start: 2023-09-18

## 2023-09-18 RX ORDER — AMLODIPINE BESYLATE 10 MG/1
10 TABLET ORAL DAILY
Qty: 90 TABLET | Refills: 0 | Status: SHIPPED | OUTPATIENT
Start: 2023-09-18

## 2023-10-20 ENCOUNTER — TELEPHONE (OUTPATIENT)
Age: 63
End: 2023-10-20

## 2023-10-20 NOTE — TELEPHONE ENCOUNTER
PT called and requested for recent office notes, labs and demographics and labs to be faxed to number provided.     F:[phone]

## 2023-10-30 ENCOUNTER — NURSE TRIAGE (OUTPATIENT)
Dept: OTHER | Facility: CLINIC | Age: 63
End: 2023-10-30

## 2023-10-30 NOTE — TELEPHONE ENCOUNTER
[facility]    Received call from [address] at [facility] with [employer] Complaint. Current Symptoms: left arm pain from thumb to mid forearm, pain increases when pressing on wrist, reduced left  strength    No known injury    Onset: 1 month ago;       Pain Severity: 8/10    Temperature: denies     What has been tried: hot water, motrin    Denies - swelling / bruising / red streaking on arm     Recommended disposition: Go to Office Now    Care advice provided, patient verbalizes understanding; denies any other questions or concerns; instructed to call back for any new or worsening symptoms. Patient/Caller agrees with recommended disposition; writer provided warm transfer to [facility] at [facility] for appointment scheduling    Attention Provider: Thank you for allowing me to participate in the care of your patient. The patient was connected to triage in response to information provided to the ECC/PSC. Please do not respond through this encounter as the response is not directed to a shared pool.         Reason for Disposition   Weakness (i.e., loss of strength) in hand or fingers  (Exception: Not truly weak; hand feels weak because of pain.)    Protocols used: Arm Pain-ADULT-[place]

## 2023-11-14 ENCOUNTER — TELEPHONE (OUTPATIENT)
Age: 63
End: 2023-11-14

## 2023-11-14 ENCOUNTER — NURSE TRIAGE (OUTPATIENT)
Dept: OTHER | Facility: CLINIC | Age: 63
End: 2023-11-14

## 2023-11-14 NOTE — TELEPHONE ENCOUNTER
[facility]    Received call from [address] [facility] with [facility]. Subjective: Caller states \"from the knuckle of thumb to wrist to midpoint of my left arm has been in pain for over a month now. Now experiencing discomfort when I have to urinate and there is a strong odor. Was having frequent urinary issues the first part of the month. Feels like pressure. \"     Current Symptoms:   Discomfort when urinating  Feeling slightly full still after urinating  Vaginal odor  Left arm pain    Onset: Left arm pain started a month ago. Discomfort while urinating started about 4 days ago. Temperature: Denies fever     What has been tried: Increased water intake    LMP: NA Pregnant: No    Recommended disposition: See PCP within 24 Hours    Care advice provided, patient verbalizes understanding; denies any other questions or concerns; instructed to call back for any new or worsening symptoms. Patient/Caller agrees with recommended disposition; writer provided warm transfer to [facility] for appointment scheduling    Attention Provider: Thank you for allowing me to participate in the care of your patient. The patient was connected to triage in response to information provided to the ECC/PSC. Please do not respond through this encounter as the response is not directed to a shared pool.     Reason for Disposition   Urinating more frequently than usual (i.e., frequency)    Protocols used: Urinary Symptoms-ADULT-AH

## 2023-11-14 NOTE — TELEPHONE ENCOUNTER
PT called to confirm  that we sent needed info to [facility], I confirmed the info was sent 3 times and she stated that she understood.  PT stated that [facility] is stating that they never received it and would like us to send it again

## 2023-11-15 ENCOUNTER — OFFICE VISIT (OUTPATIENT)
Facility: CLINIC | Age: 63
End: 2023-11-15
Payer: MEDICARE

## 2023-11-15 VITALS
SYSTOLIC BLOOD PRESSURE: 135 MMHG | HEIGHT: 68 IN | HEART RATE: 99 BPM | DIASTOLIC BLOOD PRESSURE: 81 MMHG | RESPIRATION RATE: 18 BRPM | OXYGEN SATURATION: 97 % | TEMPERATURE: 98 F | WEIGHT: 203 LBS | BODY MASS INDEX: 30.77 KG/M2

## 2023-11-15 DIAGNOSIS — R10.32 LLQ PAIN: ICD-10-CM

## 2023-11-15 DIAGNOSIS — D86.9 SARCOID: Primary | ICD-10-CM

## 2023-11-15 DIAGNOSIS — N76.0 ACUTE VAGINITIS: ICD-10-CM

## 2023-11-15 LAB
BILIRUBIN, URINE, POC: NORMAL
BLOOD URINE, POC: NEGATIVE
GLUCOSE URINE, POC: NEGATIVE
KETONES, URINE, POC: NORMAL
LEUKOCYTE ESTERASE, URINE, POC: NEGATIVE
NITRITE, URINE, POC: NEGATIVE
PH, URINE, POC: 5.5 (ref 4.6–8)
PROTEIN,URINE, POC: NORMAL
SPECIFIC GRAVITY, URINE, POC: 1.03 (ref 1–1.03)
URINALYSIS CLARITY, POC: CLEAR
URINALYSIS COLOR, POC: YELLOW
UROBILINOGEN, POC: NORMAL

## 2023-11-15 PROCEDURE — G8417 CALC BMI ABV UP PARAM F/U: HCPCS | Performed by: NURSE PRACTITIONER

## 2023-11-15 PROCEDURE — 3017F COLORECTAL CA SCREEN DOC REV: CPT | Performed by: NURSE PRACTITIONER

## 2023-11-15 PROCEDURE — 3078F DIAST BP <80 MM HG: CPT | Performed by: NURSE PRACTITIONER

## 2023-11-15 PROCEDURE — 81001 URINALYSIS AUTO W/SCOPE: CPT | Performed by: NURSE PRACTITIONER

## 2023-11-15 PROCEDURE — G8484 FLU IMMUNIZE NO ADMIN: HCPCS | Performed by: NURSE PRACTITIONER

## 2023-11-15 PROCEDURE — 1036F TOBACCO NON-USER: CPT | Performed by: NURSE PRACTITIONER

## 2023-11-15 PROCEDURE — 3074F SYST BP LT 130 MM HG: CPT | Performed by: NURSE PRACTITIONER

## 2023-11-15 PROCEDURE — 99213 OFFICE O/P EST LOW 20 MIN: CPT | Performed by: NURSE PRACTITIONER

## 2023-11-15 PROCEDURE — G8427 DOCREV CUR MEDS BY ELIG CLIN: HCPCS | Performed by: NURSE PRACTITIONER

## 2023-11-15 RX ORDER — AMLODIPINE BESYLATE 10 MG/1
10 TABLET ORAL DAILY
Qty: 90 TABLET | Refills: 0 | Status: SHIPPED | OUTPATIENT
Start: 2023-11-15

## 2023-11-15 RX ORDER — TRIAMCINOLONE ACETONIDE 1 MG/G
OINTMENT TOPICAL
Qty: 30 G | Refills: 1 | Status: SHIPPED | OUTPATIENT
Start: 2023-11-15

## 2023-11-15 RX ORDER — CETIRIZINE HYDROCHLORIDE 10 MG/1
10 TABLET ORAL NIGHTLY
Qty: 90 TABLET | Refills: 0 | Status: SHIPPED | OUTPATIENT
Start: 2023-11-15

## 2023-11-15 NOTE — PROGRESS NOTES
Subjective: (As above and below)     Chief Complaint   Patient presents with    Urinary Tract Infection     Pt states that she has lower abdomen pressure, and will     Referral - General     For Candice Cirilo is a 61y.o. year old female who presents for possible UTI  Pressure to LLQ, urinary hesitancy  No dysuria, scant vaginal odor but not noted discharge  Occ loose stools but this is not new, no nausea, vomiting  No fevers or flank pain    She also would like to est care w [facility] for sarcoid    Reviewed PmHx, RxHx, FmHx, SocHx, AllgHx and updated in chart. Family History   Problem Relation Age of Onset    Heart Disease Father 1             Stroke Mother 1             Diabetes Mother     Hypertension Mother     Gout Father     Breast Cancer Cousin         mat. 1st cousin       Past Medical History:   Diagnosis Date    Adult ADHD     Calculus of kidney     Depression     Hypercholesterolemia     Hypertension     Rheumatoid arthritis ([address])     Sarcoid       Social History     Socioeconomic History    Marital status: Single     Spouse name: None    Number of children: None    Years of education: None    Highest education level: None   Tobacco Use    Smoking status: Never    Smokeless tobacco: Never   Substance and Sexual Activity    Alcohol use: No    Drug use: No   Social History Narrative    Living with son (28) and son's father. No pets in the house.       Social Determinants of Health     Financial Resource Strain: Medium Risk (2023)    Overall Financial Resource Strain (CARDIA)     Difficulty of Paying Living Expenses: Somewhat hard   Food Insecurity: No Food Insecurity (2023)    Hunger Vital Sign     Worried About Running Out of Food in the Last Year: Never true     Ran Out of Food in the Last Year: Never true   Transportation Needs: Unmet Transportation Needs (2023)    PRAPARE - Transportation     Lack of Transportation (Non-Medical): Yes   Housing Stability:

## 2023-11-15 NOTE — PROGRESS NOTES
Pt is here for   Chief Complaint   Patient presents with    Urinary Tract Infection     Pt states that she has lower abdomen pressure, and will     Referral - General     For [facility]         1. Have you been to the ER, urgent care clinic since your last visit? Hospitalized since your last visit? No    2. Have you seen or consulted any other health care providers outside of the [address] [place] [address] since your last visit? Include any pap smears or colon screening.  No

## 2023-11-16 DIAGNOSIS — N30.90 CYSTITIS: ICD-10-CM

## 2023-11-16 DIAGNOSIS — N76.0 ACUTE VAGINITIS: ICD-10-CM

## 2023-11-17 LAB
BACTERIA SPEC CULT: NORMAL
SERVICE CMNT-IMP: NORMAL

## 2023-11-17 RX ORDER — CYCLOBENZAPRINE HCL 10 MG
10 TABLET ORAL
Qty: 30 TABLET | Refills: 1 | Status: SHIPPED | OUTPATIENT
Start: 2023-11-17

## 2023-11-18 LAB
A VAGINAE DNA VAG QL NAA+PROBE: NORMAL SCORE
BVAB2 DNA VAG QL NAA+PROBE: NORMAL SCORE
C ALBICANS DNA VAG QL NAA+PROBE: NEGATIVE
C GLABRATA DNA VAG QL NAA+PROBE: NEGATIVE
C TRACH RRNA SPEC QL NAA+PROBE: NEGATIVE
CANDIDA KRUSEI: NEGATIVE
CANDIDA LUSITANIAE, NAA: NEGATIVE
CANDIDA PARAPSILOSIS/TROPICALIS: NEGATIVE
MEGA1 DNA VAG QL NAA+PROBE: NORMAL SCORE
N GONORRHOEA RRNA SPEC QL NAA+PROBE: NEGATIVE
T VAGINALIS RRNA SPEC QL NAA+PROBE: NEGATIVE

## 2024-01-26 ENCOUNTER — TRANSCRIBE ORDERS (OUTPATIENT)
Facility: HOSPITAL | Age: 64
End: 2024-01-26

## 2024-01-26 DIAGNOSIS — Z12.31 ENCOUNTER FOR MAMMOGRAM TO ESTABLISH BASELINE MAMMOGRAM: Primary | ICD-10-CM

## 2024-02-26 ENCOUNTER — HOSPITAL ENCOUNTER (OUTPATIENT)
Facility: HOSPITAL | Age: 64
Discharge: HOME | End: 2024-02-29
Payer: MEDICARE

## 2024-02-26 VITALS — BODY MASS INDEX: 30.31 KG/M2 | WEIGHT: 200 LBS | HEIGHT: 68 IN

## 2024-02-26 DIAGNOSIS — Z12.31 ENCOUNTER FOR MAMMOGRAM TO ESTABLISH BASELINE MAMMOGRAM: ICD-10-CM

## 2024-02-26 PROCEDURE — 77063 BREAST TOMOSYNTHESIS BI: CPT

## 2024-03-01 NOTE — PROGRESS NOTES
Jackie Ray is a 58 y.o. female who was seen by synchronous (real-time) audio-video technology on 1/27/2023 for Arm Pain (Right arm x 1-2 weeks from shoulder to fingers, pain will go away, pt states pain feels muscular )        Assessment & Plan:   Diagnoses and all orders for this visit:    1. Dry skin  -     THYROID CASCADE PROFILE; Future    2. Muscle spasm  -     cyclobenzaprine (FLEXERIL) 10 mg tablet; Take 1 Tablet by mouth nightly as needed for Muscle Spasm(s). 3. Vitamin D deficiency  -     VITAMIN D, 25 HYDROXY; Future    4. Elevated alkaline phosphatase level  -     GGT; Future    5. Primary hypertension  -     THYROID CASCADE PROFILE; Future  -     CBC W/O DIFF  -     METABOLIC PANEL, BASIC      The complexity of medical decision making for this visit is moderate     Arm  heat, massage  She has fu in office on 2/16/23 to address other HM due    Subjective:       Prior to Admission medications    Medication Sig Start Date End Date Taking? Authorizing Provider   ibuprofen (MOTRIN) 800 mg tablet Take 1 Tablet by mouth every eight (8) hours as needed for Pain. Please call Dr. Jimmy Novak to schedule followup before further refills 1/26/23  Yes Pramod Hayward MD   amLODIPine (NORVASC) 10 mg tablet TAKE 1 TABLET BY MOUTH EVERY DAY 10/21/22  Yes Lee Levine NP   triamcinolone acetonide (KENALOG) 0.1 % ointment APPLY A THIN LAYER TO AFFECTED AREA TWICE A DAY 9/26/22  Yes Lee Levine NP   dextroamphetamine-amphetamine (ADDERALL) 20 mg tablet Take 1 Tablet by mouth two (2) times a day. Max Daily Amount: 40 mg. 9/15/22  Yes Lee Levine NP   cetirizine (ZYRTEC) 10 mg tablet TAKE 1 TABLET BY MOUTH NIGHTLY 9/12/22  Yes Hoa JACKSON NP   pregabalin (LYRICA) 75 mg capsule Take 75 mg by mouth two (2) times a day.  1/27/22  Yes Provider, Historical   albuterol (PROVENTIL HFA, VENTOLIN HFA, PROAIR HFA) 90 mcg/actuation inhaler Take 1 Puff by inhalation every six (6) hours as needed for Wheezing. 3/19/20  Yes Kyler Lopez, MAZIN   ascorbic acid, vitamin C, (VITAMIN C) 500 mg tablet Take  by mouth. Yes Provider, Historical   FOLIC ACID/MULTIVIT-MIN/LUTEIN (CENTRUM SILVER PO) Take  by mouth. Yes Provider, Historical   ipratropium (ATROVENT) 42 mcg (0.06 %) nasal spray 2 (TWO) SPRAY EACH NOSTRIL 2 TIMES DAILY 10/6/22   Provider, Historical   pantoprazole (PROTONIX) 40 mg tablet Take 40 mg by mouth daily. Patient not taking: Reported on 1/27/2023 9/28/22   Provider, Historical   leflunomide (ARAVA) 20 mg tablet Take 1 Tablet by mouth daily. 9/27/22   Joe Quinteros MD   cholecalciferol, vitD3,/vit K2 (vitamin D3-vitamin K2) 125-90 mcg cap Take  by mouth. Provider, Historical   fluticasone propionate (FLONASE) 50 mcg/actuation nasal spray USE 2 SPRAYS IN EACH NOSTRIL TWICE A DAY 5/30/19   Provider, Historical         ROS    Dry skin, hair thinning, had labs done over the summer- TSH slightly low, due for recheck  No new hair products  On amlodipine  No bald patches    Hypertension ROS:  taking medications as instructed, no medication side effects noted, no TIAs, no chest pain on exertion, no dyspnea on exertion, no swelling of ankles    Sarcoid: follows w/ pulm    R arm pain; few weeks, feels tight, massage hurts at first but then feels better  No neck pain or hand weakness  No injury  She is R handed  Ibup helps a bit          Objective:   No flowsheet data found.      [INSTRUCTIONS:  \"[x]\" Indicates a positive item  \"[]\" Indicates a negative item  -- DELETE ALL ITEMS NOT EXAMINED]    Constitutional: [x] Appears well-developed and well-nourished [x] No apparent distress      [] Abnormal -     Mental status: [x] Alert and awake  [x] Oriented to person/place/time [x] Able to follow commands    [] Abnormal -     Eyes:   EOM    [x]  Normal    [] Abnormal -   Sclera  [x]  Normal    [] Abnormal -          Discharge [x]  None visible   [] Abnormal -     HENT: [x] Normocephalic, atraumatic  [] Abnormal -   [x] Mouth/Throat: Mucous membranes are moist    External Ears [x] Normal  [] Abnormal -    Neck: [x] No visualized mass [] Abnormal -     Pulmonary/Chest: [x] Respiratory effort normal   [x] No visualized signs of difficulty breathing or respiratory distress        [] Abnormal -      Musculoskeletal:   [x] Normal gait with no signs of ataxia         [x] Normal range of motion of neck        [] Abnormal -     Neurological:        [x] No Facial Asymmetry (Cranial nerve 7 motor function) (limited exam due to video visit)          [x] No gaze palsy        [] Abnormal -          Skin:        [x] No significant exanthematous lesions or discoloration noted on facial skin         [] Abnormal -            Psychiatric:       [x] Normal Affect [] Abnormal -        [x] No Hallucinations    Other pertinent observable physical exam findings:-        We discussed the expected course, resolution and complications of the diagnosis(es) in detail. Medication risks, benefits, costs, interactions, and alternatives were discussed as indicated. I advised her to contact the office if her condition worsens, changes or fails to improve as anticipated. She expressed understanding with the diagnosis(es) and plan. Suri Zhang, was evaluated through a synchronous (real-time) audio-video encounter. The patient (or guardian if applicable) is aware that this is a billable service, which includes applicable co-pays. This Virtual Visit was conducted with patient's (and/or legal guardian's) consent. The visit was conducted pursuant to the emergency declaration under the [address] [place] [address] authority and the [employer] and [employer] General Act. Patient identification was verified, and a caregiver was present when appropriate. The patient was located at: Home: [phone]  The provider was located at: Home: [address] [place] [address]. Kim Carrington, NP Consistent Carbohydrate Diabetic Diets

## 2024-05-23 RX ORDER — TRIAMCINOLONE ACETONIDE 1 MG/G
OINTMENT TOPICAL
Qty: 30 G | Refills: 1 | Status: SHIPPED | OUTPATIENT
Start: 2024-05-23

## 2024-05-30 ENCOUNTER — OFFICE VISIT (OUTPATIENT)
Facility: CLINIC | Age: 64
End: 2024-05-30
Payer: MEDICARE

## 2024-05-30 VITALS
RESPIRATION RATE: 18 BRPM | HEART RATE: 72 BPM | WEIGHT: 201.9 LBS | DIASTOLIC BLOOD PRESSURE: 83 MMHG | TEMPERATURE: 98.6 F | HEIGHT: 68 IN | BODY MASS INDEX: 30.6 KG/M2 | OXYGEN SATURATION: 100 % | SYSTOLIC BLOOD PRESSURE: 117 MMHG

## 2024-05-30 DIAGNOSIS — I10 ESSENTIAL HYPERTENSION WITH GOAL BLOOD PRESSURE LESS THAN 140/90: ICD-10-CM

## 2024-05-30 DIAGNOSIS — F33.41 RECURRENT MAJOR DEPRESSIVE DISORDER, IN PARTIAL REMISSION (HCC): ICD-10-CM

## 2024-05-30 DIAGNOSIS — R14.0 BLOATING: ICD-10-CM

## 2024-05-30 DIAGNOSIS — Z00.00 MEDICARE ANNUAL WELLNESS VISIT, SUBSEQUENT: Primary | ICD-10-CM

## 2024-05-30 DIAGNOSIS — J45.20 MILD INTERMITTENT ASTHMA WITHOUT COMPLICATION: ICD-10-CM

## 2024-05-30 DIAGNOSIS — M06.9 RHEUMATOID ARTHRITIS INVOLVING MULTIPLE SITES, UNSPECIFIED WHETHER RHEUMATOID FACTOR PRESENT (HCC): ICD-10-CM

## 2024-05-30 PROCEDURE — G8427 DOCREV CUR MEDS BY ELIG CLIN: HCPCS | Performed by: NURSE PRACTITIONER

## 2024-05-30 PROCEDURE — 3078F DIAST BP <80 MM HG: CPT | Performed by: NURSE PRACTITIONER

## 2024-05-30 PROCEDURE — 3074F SYST BP LT 130 MM HG: CPT | Performed by: NURSE PRACTITIONER

## 2024-05-30 PROCEDURE — G0439 PPPS, SUBSEQ VISIT: HCPCS | Performed by: NURSE PRACTITIONER

## 2024-05-30 PROCEDURE — G8417 CALC BMI ABV UP PARAM F/U: HCPCS | Performed by: NURSE PRACTITIONER

## 2024-05-30 PROCEDURE — 3017F COLORECTAL CA SCREEN DOC REV: CPT | Performed by: NURSE PRACTITIONER

## 2024-05-30 PROCEDURE — 99214 OFFICE O/P EST MOD 30 MIN: CPT | Performed by: NURSE PRACTITIONER

## 2024-05-30 PROCEDURE — 1036F TOBACCO NON-USER: CPT | Performed by: NURSE PRACTITIONER

## 2024-05-30 ASSESSMENT — PATIENT HEALTH QUESTIONNAIRE - PHQ9
SUM OF ALL RESPONSES TO PHQ9 QUESTIONS 1 & 2: 2
8. MOVING OR SPEAKING SO SLOWLY THAT OTHER PEOPLE COULD HAVE NOTICED. OR THE OPPOSITE, BEING SO FIGETY OR RESTLESS THAT YOU HAVE BEEN MOVING AROUND A LOT MORE THAN USUAL: NOT AT ALL
1. LITTLE INTEREST OR PLEASURE IN DOING THINGS: SEVERAL DAYS
SUM OF ALL RESPONSES TO PHQ QUESTIONS 1-9: 4
5. POOR APPETITE OR OVEREATING: NOT AT ALL
3. TROUBLE FALLING OR STAYING ASLEEP: SEVERAL DAYS
SUM OF ALL RESPONSES TO PHQ QUESTIONS 1-9: 4
6. FEELING BAD ABOUT YOURSELF - OR THAT YOU ARE A FAILURE OR HAVE LET YOURSELF OR YOUR FAMILY DOWN: NOT AT ALL
4. FEELING TIRED OR HAVING LITTLE ENERGY: SEVERAL DAYS
2. FEELING DOWN, DEPRESSED OR HOPELESS: SEVERAL DAYS
7. TROUBLE CONCENTRATING ON THINGS, SUCH AS READING THE NEWSPAPER OR WATCHING TELEVISION: NOT AT ALL
9. THOUGHTS THAT YOU WOULD BE BETTER OFF DEAD, OR OF HURTING YOURSELF: NOT AT ALL
SUM OF ALL RESPONSES TO PHQ QUESTIONS 1-9: 4
10. IF YOU CHECKED OFF ANY PROBLEMS, HOW DIFFICULT HAVE THESE PROBLEMS MADE IT FOR YOU TO DO YOUR WORK, TAKE CARE OF THINGS AT HOME, OR GET ALONG WITH OTHER PEOPLE: SOMEWHAT DIFFICULT
SUM OF ALL RESPONSES TO PHQ QUESTIONS 1-9: 4

## 2024-05-30 ASSESSMENT — LIFESTYLE VARIABLES
HOW OFTEN DO YOU HAVE A DRINK CONTAINING ALCOHOL: MONTHLY OR LESS
HOW MANY STANDARD DRINKS CONTAINING ALCOHOL DO YOU HAVE ON A TYPICAL DAY: 1 OR 2

## 2024-05-30 NOTE — PROGRESS NOTES
Eve Escamilla is a 64 y.o. female  Chief Complaint   Patient presents with    Medicare AWV     /83 (Site: Right Upper Arm, Position: Sitting)   Pulse 72   Temp 98.6 °F (37 °C)   Resp 18   Ht 1.727 m (5' 8\")   Wt 91.6 kg (201 lb 14.4 oz)   SpO2 100%   BMI 30.70 kg/m²   \"Have you been to the ER, urgent care clinic since your last visit?  Hospitalized since your last visit?\"    NO    “Have you seen or consulted any other health care providers outside of [facility] since your last visit?”    NO            Click Here for Release of Records Request

## 2024-05-30 NOTE — PATIENT INSTRUCTIONS

## 2024-05-30 NOTE — PROGRESS NOTES
Subjective: (As above and below)     Chief Complaint   Patient presents with    Medicare AWV     Eve Escamilla is a 64 y.o. year old female who presents for AWV and fu on chronic conditions    Asthma stable    Hypertension ROS:  taking medications as instructed, no medication side effects noted, no TIAs, no chest pain on exertion, no dyspnea on exertion, no swelling of ankles    BP Readings from Last 3 Encounters:   24 117/83   11/15/23 135/81   23 131/89       RA stable    Hx of depression but reports feeling well      Wt Readings from Last 3 Encounters:   24 91.6 kg (201 lb 14.4 oz)   24 90.7 kg (200 lb)   11/15/23 92.1 kg (203 lb)     Abd bloating: after eating she often feels bloated and will have BM after nearly every meal  Colonoscopy utd  No vomiting  Has not tracked if it is related to certain foods I.e. dairy        Reviewed PmHx, RxHx, FmHx, SocHx, AllgHx and updated in chart.  Family History   Problem Relation Age of Onset    Heart Disease Father 1             Stroke Mother 1             Diabetes Mother     Hypertension Mother     Gout Father     Breast Cancer Cousin         mat. 1st cousin       Past Medical History:   Diagnosis Date    Adult ADHD     Calculus of kidney     Depression     Hypercholesterolemia     Hypertension     Rheumatoid arthritis (HCC)     Sarcoid       Social History     Socioeconomic History    Marital status: Single     Spouse name: None    Number of children: None    Years of education: None    Highest education level: None   Tobacco Use    Smoking status: Never    Smokeless tobacco: Never   Substance and Sexual Activity    Alcohol use: No    Drug use: No    Sexual activity: Not Currently     Partners: Male   Social History Narrative    Living with son (35) and son's father.   No pets in the house.      Social Determinants of Health     Financial Resource Strain: Medium Risk (2023)    Overall Financial Resource Strain (CARDIA)

## 2024-05-31 LAB
ALBUMIN SERPL-MCNC: 4.2 G/DL (ref 3.5–5)
ALBUMIN/GLOB SERPL: 1.1 (ref 1.1–2.2)
ALP SERPL-CCNC: 161 U/L (ref 45–117)
ALT SERPL-CCNC: 16 U/L (ref 12–78)
AST SERPL-CCNC: 16 U/L (ref 15–37)
BILIRUB SERPL-MCNC: 0.6 MG/DL (ref 0.2–1)
BUN SERPL-MCNC: 15 MG/DL (ref 6–20)
BUN/CREAT SERPL: 15 (ref 12–20)
CHOLEST SERPL-MCNC: 265 MG/DL
CO2 SERPL-SCNC: 28 MMOL/L (ref 21–32)
CREAT SERPL-MCNC: 0.98 MG/DL (ref 0.55–1.02)
ERYTHROCYTE [DISTWIDTH] IN BLOOD BY AUTOMATED COUNT: 12.7 % (ref 11.5–14.5)
GLOBULIN SER CALC-MCNC: 3.7 G/DL (ref 2–4)
GLUCOSE SERPL-MCNC: 94 MG/DL (ref 65–100)
HCT VFR BLD AUTO: 44.6 % (ref 35–47)
HDLC SERPL-MCNC: 52 MG/DL
HDLC SERPL: 5.1 (ref 0–5)
HGB BLD-MCNC: 14.8 G/DL (ref 11.5–16)
LDLC SERPL CALC-MCNC: 189 MG/DL (ref 0–100)
MCH RBC QN AUTO: 30.8 PG (ref 26–34)
MCV RBC AUTO: 92.7 FL (ref 80–99)
NRBC # BLD: 0 K/UL (ref 0–0.01)
NRBC BLD-RTO: 0 PER 100 WBC
PLATELET # BLD AUTO: 256 K/UL (ref 150–400)
PMV BLD AUTO: 11.7 FL (ref 8.9–12.9)
POTASSIUM SERPL-SCNC: 4.5 MMOL/L (ref 3.5–5.1)
PROT SERPL-MCNC: 7.9 G/DL (ref 6.4–8.2)
RBC # BLD AUTO: 4.81 M/UL (ref 3.8–5.2)
TRIGL SERPL-MCNC: 120 MG/DL
VLDLC SERPL CALC-MCNC: 24 MG/DL
WBC # BLD AUTO: 3.6 K/UL (ref 3.6–11)

## 2024-06-14 ENCOUNTER — TELEPHONE (OUTPATIENT)
Facility: CLINIC | Age: 64
End: 2024-06-14

## 2024-06-14 DIAGNOSIS — E78.2 MIXED HYPERLIPIDEMIA: Primary | ICD-10-CM

## 2024-06-14 RX ORDER — EZETIMIBE 10 MG/1
10 TABLET ORAL DAILY
Qty: 90 TABLET | Refills: 1 | Status: SHIPPED | OUTPATIENT
Start: 2024-06-14

## 2024-06-14 NOTE — TELEPHONE ENCOUNTER
Kashif   High cholesterol  She can't tolerate simvastain or pravastatin- muscle and GI s/s  Will try zetia and repeat labs in 3 mo

## 2024-07-17 NOTE — PROGRESS NOTES
Room 7    Chief Complaint   Patient presents with    Complete Physical     No Pap     1. Have you been to the ER, urgent care clinic since your last visit? Hospitalized since your last visit? No    2. Have you seen or consulted any other health care providers outside of the [address] [place] [address] since your last visit? Include any pap smears or colon screening.  No     Health Maintenance Due   Topic Date Due    BREAST CANCER SCRN MAMMOGRAM  04/27/2010    FOBT Q 1 YEAR AGE 50-75  04/27/2010 101.0 F

## 2024-08-27 RX ORDER — CYCLOBENZAPRINE HCL 10 MG
10 TABLET ORAL
Qty: 30 TABLET | Refills: 1 | Status: SHIPPED | OUTPATIENT
Start: 2024-08-27

## 2024-08-27 RX ORDER — AMLODIPINE BESYLATE 10 MG/1
10 TABLET ORAL DAILY
Qty: 90 TABLET | Refills: 0 | Status: SHIPPED | OUTPATIENT
Start: 2024-08-27

## 2024-09-22 DIAGNOSIS — E78.2 MIXED HYPERLIPIDEMIA: ICD-10-CM

## 2024-09-23 RX ORDER — CETIRIZINE HYDROCHLORIDE 10 MG/1
10 TABLET ORAL NIGHTLY
Qty: 30 TABLET | Refills: 2 | Status: SHIPPED | OUTPATIENT
Start: 2024-09-23

## 2024-09-23 RX ORDER — EZETIMIBE 10 MG/1
TABLET ORAL
Qty: 90 TABLET | Refills: 1 | Status: SHIPPED | OUTPATIENT
Start: 2024-09-23

## 2024-11-22 RX ORDER — AMLODIPINE BESYLATE 10 MG/1
10 TABLET ORAL DAILY
Qty: 90 TABLET | Refills: 0 | Status: SHIPPED | OUTPATIENT
Start: 2024-11-22

## 2024-12-10 RX ORDER — AMLODIPINE BESYLATE 10 MG/1
10 TABLET ORAL DAILY
Qty: 90 TABLET | Refills: 0 | Status: SHIPPED | OUTPATIENT
Start: 2024-12-10

## 2024-12-10 RX ORDER — CETIRIZINE HYDROCHLORIDE 10 MG/1
10 TABLET ORAL NIGHTLY
Qty: 90 TABLET | Refills: 0 | Status: SHIPPED | OUTPATIENT
Start: 2024-12-10

## 2025-01-02 ENCOUNTER — HOSPITAL ENCOUNTER (OUTPATIENT)
Facility: HOSPITAL | Age: 65
Discharge: HOME | End: 2025-01-02
Attending: INTERNAL MEDICINE
Payer: MEDICARE

## 2025-01-02 DIAGNOSIS — D86.9 SARCOIDOSIS: ICD-10-CM

## 2025-01-02 PROCEDURE — 71250 CT THORAX DX C-: CPT

## 2025-03-09 RX ORDER — CETIRIZINE HYDROCHLORIDE 10 MG/1
10 TABLET ORAL NIGHTLY
Qty: 90 TABLET | Refills: 0 | Status: SHIPPED | OUTPATIENT
Start: 2025-03-09

## 2025-05-17 RX ORDER — AMLODIPINE BESYLATE 10 MG/1
10 TABLET ORAL DAILY
Qty: 90 TABLET | Refills: 0 | Status: SHIPPED | OUTPATIENT
Start: 2025-05-17

## 2025-06-01 SDOH — ECONOMIC STABILITY: INCOME INSECURITY: IN THE LAST 12 MONTHS, WAS THERE A TIME WHEN YOU WERE NOT ABLE TO PAY THE MORTGAGE OR RENT ON TIME?: NO

## 2025-06-01 SDOH — ECONOMIC STABILITY: FOOD INSECURITY: WITHIN THE PAST 12 MONTHS, YOU WORRIED THAT YOUR FOOD WOULD RUN OUT BEFORE YOU GOT MONEY TO BUY MORE.: SOMETIMES TRUE

## 2025-06-01 SDOH — ECONOMIC STABILITY: FOOD INSECURITY: WITHIN THE PAST 12 MONTHS, THE FOOD YOU BOUGHT JUST DIDN'T LAST AND YOU DIDN'T HAVE MONEY TO GET MORE.: NEVER TRUE

## 2025-06-01 SDOH — ECONOMIC STABILITY: TRANSPORTATION INSECURITY
IN THE PAST 12 MONTHS, HAS THE LACK OF TRANSPORTATION KEPT YOU FROM MEDICAL APPOINTMENTS OR FROM GETTING MEDICATIONS?: YES

## 2025-06-01 SDOH — HEALTH STABILITY: PHYSICAL HEALTH: ON AVERAGE, HOW MANY DAYS PER WEEK DO YOU ENGAGE IN MODERATE TO STRENUOUS EXERCISE (LIKE A BRISK WALK)?: 2 DAYS

## 2025-06-01 SDOH — HEALTH STABILITY: PHYSICAL HEALTH: ON AVERAGE, HOW MANY MINUTES DO YOU ENGAGE IN EXERCISE AT THIS LEVEL?: 30 MIN

## 2025-06-01 SDOH — ECONOMIC STABILITY: TRANSPORTATION INSECURITY
IN THE PAST 12 MONTHS, HAS LACK OF TRANSPORTATION KEPT YOU FROM MEETINGS, WORK, OR FROM GETTING THINGS NEEDED FOR DAILY LIVING?: NO

## 2025-06-01 ASSESSMENT — LIFESTYLE VARIABLES
HOW OFTEN DO YOU HAVE A DRINK CONTAINING ALCOHOL: NEVER
HOW MANY STANDARD DRINKS CONTAINING ALCOHOL DO YOU HAVE ON A TYPICAL DAY: 0
HOW OFTEN DO YOU HAVE SIX OR MORE DRINKS ON ONE OCCASION: 1
HOW OFTEN DO YOU HAVE A DRINK CONTAINING ALCOHOL: 1
HOW MANY STANDARD DRINKS CONTAINING ALCOHOL DO YOU HAVE ON A TYPICAL DAY: PATIENT DOES NOT DRINK

## 2025-06-01 ASSESSMENT — PATIENT HEALTH QUESTIONNAIRE - PHQ9
SUM OF ALL RESPONSES TO PHQ QUESTIONS 1-9: 0
2. FEELING DOWN, DEPRESSED OR HOPELESS: NOT AT ALL
SUM OF ALL RESPONSES TO PHQ QUESTIONS 1-9: 0
1. LITTLE INTEREST OR PLEASURE IN DOING THINGS: NOT AT ALL

## 2025-06-02 ENCOUNTER — OFFICE VISIT (OUTPATIENT)
Facility: CLINIC | Age: 65
End: 2025-06-02
Payer: MEDICARE

## 2025-06-02 VITALS
DIASTOLIC BLOOD PRESSURE: 82 MMHG | RESPIRATION RATE: 18 BRPM | HEIGHT: 68 IN | TEMPERATURE: 97.6 F | WEIGHT: 196.3 LBS | HEART RATE: 89 BPM | BODY MASS INDEX: 29.75 KG/M2 | SYSTOLIC BLOOD PRESSURE: 120 MMHG | OXYGEN SATURATION: 98 %

## 2025-06-02 DIAGNOSIS — J45.20 MILD INTERMITTENT ASTHMA WITHOUT COMPLICATION: ICD-10-CM

## 2025-06-02 DIAGNOSIS — Z00.00 MEDICARE ANNUAL WELLNESS VISIT, SUBSEQUENT: Primary | ICD-10-CM

## 2025-06-02 DIAGNOSIS — M06.9 RHEUMATOID ARTHRITIS INVOLVING MULTIPLE SITES, UNSPECIFIED WHETHER RHEUMATOID FACTOR PRESENT (HCC): ICD-10-CM

## 2025-06-02 DIAGNOSIS — I10 ESSENTIAL HYPERTENSION WITH GOAL BLOOD PRESSURE LESS THAN 140/90: ICD-10-CM

## 2025-06-02 DIAGNOSIS — E78.2 MIXED HYPERLIPIDEMIA: ICD-10-CM

## 2025-06-02 DIAGNOSIS — E78.00 HYPERCHOLESTEROLEMIA: ICD-10-CM

## 2025-06-02 DIAGNOSIS — Z12.11 COLON CANCER SCREENING: ICD-10-CM

## 2025-06-02 DIAGNOSIS — H93.8X3 SENSATION OF FULLNESS IN BOTH EARS: ICD-10-CM

## 2025-06-02 DIAGNOSIS — Z12.31 SCREENING MAMMOGRAM FOR BREAST CANCER: ICD-10-CM

## 2025-06-02 PROBLEM — M15.9 OSTEOARTHRITIS OF MULTIPLE JOINTS: Status: ACTIVE | Noted: 2025-03-17

## 2025-06-02 LAB
ALBUMIN SERPL-MCNC: 4.6 G/DL (ref 3.5–5)
ALBUMIN/GLOB SERPL: 1.3 (ref 1.1–2.2)
ALP SERPL-CCNC: 153 U/L (ref 45–117)
ALT SERPL-CCNC: 16 U/L (ref 12–78)
ANION GAP SERPL CALC-SCNC: 8 MMOL/L (ref 2–12)
AST SERPL-CCNC: 14 U/L (ref 15–37)
BILIRUB SERPL-MCNC: 0.5 MG/DL (ref 0.2–1)
BUN SERPL-MCNC: 21 MG/DL (ref 6–20)
BUN/CREAT SERPL: 17 (ref 12–20)
CALCIUM SERPL-MCNC: 10.2 MG/DL (ref 8.5–10.1)
CHLORIDE SERPL-SCNC: 104 MMOL/L (ref 97–108)
CHOLEST SERPL-MCNC: 261 MG/DL
CO2 SERPL-SCNC: 27 MMOL/L (ref 21–32)
CREAT SERPL-MCNC: 1.24 MG/DL (ref 0.55–1.02)
ERYTHROCYTE [DISTWIDTH] IN BLOOD BY AUTOMATED COUNT: 12.7 % (ref 11.5–14.5)
GLOBULIN SER CALC-MCNC: 3.6 G/DL (ref 2–4)
GLUCOSE SERPL-MCNC: 105 MG/DL (ref 65–100)
HCT VFR BLD AUTO: 44.7 % (ref 35–47)
HDLC SERPL-MCNC: 48 MG/DL
HDLC SERPL: 5.4 (ref 0–5)
HGB BLD-MCNC: 14.8 G/DL (ref 11.5–16)
LDLC SERPL CALC-MCNC: 174.6 MG/DL (ref 0–100)
MCH RBC QN AUTO: 30.4 PG (ref 26–34)
MCHC RBC AUTO-ENTMCNC: 33.1 G/DL (ref 30–36.5)
MCV RBC AUTO: 91.8 FL (ref 80–99)
NRBC # BLD: 0 K/UL (ref 0–0.01)
NRBC BLD-RTO: 0 PER 100 WBC
PLATELET # BLD AUTO: 285 K/UL (ref 150–400)
PMV BLD AUTO: 12 FL (ref 8.9–12.9)
POTASSIUM SERPL-SCNC: 4.5 MMOL/L (ref 3.5–5.1)
PROT SERPL-MCNC: 8.2 G/DL (ref 6.4–8.2)
RBC # BLD AUTO: 4.87 M/UL (ref 3.8–5.2)
SODIUM SERPL-SCNC: 139 MMOL/L (ref 136–145)
TRIGL SERPL-MCNC: 192 MG/DL
VLDLC SERPL CALC-MCNC: 38.4 MG/DL
WBC # BLD AUTO: 3.3 K/UL (ref 3.6–11)

## 2025-06-02 PROCEDURE — 1090F PRES/ABSN URINE INCON ASSESS: CPT | Performed by: NURSE PRACTITIONER

## 2025-06-02 PROCEDURE — 1123F ACP DISCUSS/DSCN MKR DOCD: CPT | Performed by: NURSE PRACTITIONER

## 2025-06-02 PROCEDURE — G8427 DOCREV CUR MEDS BY ELIG CLIN: HCPCS | Performed by: NURSE PRACTITIONER

## 2025-06-02 PROCEDURE — G8419 CALC BMI OUT NRM PARAM NOF/U: HCPCS | Performed by: NURSE PRACTITIONER

## 2025-06-02 PROCEDURE — 3074F SYST BP LT 130 MM HG: CPT | Performed by: NURSE PRACTITIONER

## 2025-06-02 PROCEDURE — G8399 PT W/DXA RESULTS DOCUMENT: HCPCS | Performed by: NURSE PRACTITIONER

## 2025-06-02 PROCEDURE — 99214 OFFICE O/P EST MOD 30 MIN: CPT | Performed by: NURSE PRACTITIONER

## 2025-06-02 PROCEDURE — 3079F DIAST BP 80-89 MM HG: CPT | Performed by: NURSE PRACTITIONER

## 2025-06-02 PROCEDURE — 1036F TOBACCO NON-USER: CPT | Performed by: NURSE PRACTITIONER

## 2025-06-02 PROCEDURE — 3017F COLORECTAL CA SCREEN DOC REV: CPT | Performed by: NURSE PRACTITIONER

## 2025-06-02 PROCEDURE — G0439 PPPS, SUBSEQ VISIT: HCPCS | Performed by: NURSE PRACTITIONER

## 2025-06-02 SDOH — ECONOMIC STABILITY: FOOD INSECURITY: WITHIN THE PAST 12 MONTHS, THE FOOD YOU BOUGHT JUST DIDN'T LAST AND YOU DIDN'T HAVE MONEY TO GET MORE.: NEVER TRUE

## 2025-06-02 SDOH — ECONOMIC STABILITY: FOOD INSECURITY: WITHIN THE PAST 12 MONTHS, YOU WORRIED THAT YOUR FOOD WOULD RUN OUT BEFORE YOU GOT MONEY TO BUY MORE.: NEVER TRUE

## 2025-06-02 ASSESSMENT — PATIENT HEALTH QUESTIONNAIRE - PHQ9
3. TROUBLE FALLING OR STAYING ASLEEP: SEVERAL DAYS
10. IF YOU CHECKED OFF ANY PROBLEMS, HOW DIFFICULT HAVE THESE PROBLEMS MADE IT FOR YOU TO DO YOUR WORK, TAKE CARE OF THINGS AT HOME, OR GET ALONG WITH OTHER PEOPLE: SOMEWHAT DIFFICULT
SUM OF ALL RESPONSES TO PHQ QUESTIONS 1-9: 4
SUM OF ALL RESPONSES TO PHQ QUESTIONS 1-9: 4
1. LITTLE INTEREST OR PLEASURE IN DOING THINGS: NOT AT ALL
8. MOVING OR SPEAKING SO SLOWLY THAT OTHER PEOPLE COULD HAVE NOTICED. OR THE OPPOSITE, BEING SO FIGETY OR RESTLESS THAT YOU HAVE BEEN MOVING AROUND A LOT MORE THAN USUAL: NOT AT ALL
4. FEELING TIRED OR HAVING LITTLE ENERGY: NOT AT ALL
SUM OF ALL RESPONSES TO PHQ QUESTIONS 1-9: 4
SUM OF ALL RESPONSES TO PHQ QUESTIONS 1-9: 4
5. POOR APPETITE OR OVEREATING: NOT AT ALL
2. FEELING DOWN, DEPRESSED OR HOPELESS: NOT AT ALL
9. THOUGHTS THAT YOU WOULD BE BETTER OFF DEAD, OR OF HURTING YOURSELF: NOT AT ALL
7. TROUBLE CONCENTRATING ON THINGS, SUCH AS READING THE NEWSPAPER OR WATCHING TELEVISION: NEARLY EVERY DAY
6. FEELING BAD ABOUT YOURSELF - OR THAT YOU ARE A FAILURE OR HAVE LET YOURSELF OR YOUR FAMILY DOWN: NOT AT ALL

## 2025-06-02 NOTE — PROGRESS NOTES
Subjective: (As above and below)     Chief Complaint   Patient presents with    Medicare AWV    Blood Work     Eve Escamilla is a 65 y.o. year old female who presents for [facility]      History of Present Illness  The patient presents for a Medicare wellness visit     She reports overall good health but has been experiencing joint discomfort- RA.. She is under the care of Dr. Gifford for this issue and is currently undergoing physical therapy, which she finds beneficial. She is not on any regular medication for her joints but resorts to pain medication as needed. She also consumes herbal tea containing turmeric and ginger, which she believes alleviates some of the discomfort.    She does not require a referral for hearing testing as she has undergone it in the past. She suspects that her ears may need to be flushed due to occasional drainage and popping sounds, which she initially attributed to wax buildup.    She experienced severe mucus buildup during the allergy season, which led her to discontinue the use of her CPAP machine.  She has not resumed using the CPAP machine and wishes to address her ear issues first, suspecting a connection between the two.    She missed her mammogram appointment in 2025 but plans to reschedule it. Her next colonoscopy is due, with the previous one conducted at [facility] in 2020.      She declines routine STD testing. She reports no urinary issues, acid reflux, or difficulty swallowing. She reports no leg swelling. She has been using Flonase nasal spray intermittently but not regularly. She reports that her sinuses are currently clear, although they were congested a few weeks ago.      Reviewed PmHx, RxHx, FmHx, SocHx, AllgHx and updated in chart.  Family History   Problem Relation Age of Onset    Heart Disease Father 1             Stroke Mother 1             Diabetes Mother     Hypertension Mother     Gout Father     Breast Cancer Cousin         mat. 1st cousin       Past

## 2025-06-02 NOTE — PROGRESS NOTES
Eve Escamilla is a 65 y.o. female  Have you been to the ER, urgent care clinic since your last visit?  Hospitalized since your last visit?   NO    Have you seen or consulted any other health care providers outside our system since your last visit?   NO    Have you had a mammogram?”   NO    Date of last Mammogram: 2/26/2024       “Have you had a colorectal cancer screening such as a colonoscopy/FIT/Cologuard?    NO    Date of last Colonoscopy: 1/7/2020  No cologuard on file  No FIT/FOBT on file   No flexible sigmoidoscopy on file          Chief Complaint   Patient presents with    Medicare AWV    Blood Work     /82 (BP Site: Left Upper Arm, Patient Position: Sitting, BP Cuff Size: Small Adult)   Pulse 89   Temp 97.6 °F (36.4 °C) (Oral)   Resp 18   Ht 1.727 m (5' 8\")   Wt 89 kg (196 lb 4.8 oz)   SpO2 98%   BMI 29.85 kg/m²

## 2025-06-02 NOTE — PATIENT INSTRUCTIONS
have any problems.  Where can you learn more?  Go to [email or web address] and enter F075 to learn more about \"A Healthy Heart: Care Instructions.\"  Current as of: July 31, 2024  Content Version: 14.4  © 9276-4698 [employer].   Care instructions adapted under license by [employer] by [employer]. If you have questions about a medical condition or this instruction, always ask your healthcare professional. [employer] disclaims any warranty or liability for your use of this information.    Personalized Preventive Plan for Eve Escamilla - 6/2/2025  Medicare offers a range of preventive health benefits. Some of the tests and screenings are paid in full while other may be subject to a deductible, co-insurance, and/or copay.  Some of these benefits include a comprehensive review of your medical history including lifestyle, illnesses that may run in your family, and various assessments and screenings as appropriate.  After reviewing your medical record and screening and assessments performed today your provider may have ordered immunizations, labs, imaging, and/or referrals for you.  A list of these orders (if applicable) as well as your Preventive Care list are included within your After Visit Summary for your review.

## 2025-06-03 ENCOUNTER — TELEPHONE (OUTPATIENT)
Facility: CLINIC | Age: 65
End: 2025-06-03

## 2025-06-03 RX ORDER — EZETIMIBE 10 MG/1
TABLET ORAL
Qty: 90 TABLET | Refills: 1 | Status: SHIPPED | OUTPATIENT
Start: 2025-06-03

## 2025-06-03 NOTE — TELEPHONE ENCOUNTER
Referral needs to be sent again with this included information     Notes from last visit   Labs   Demographic   Allergy sheets   Insurance       Fax [phone]

## 2025-06-12 RX ORDER — CETIRIZINE HYDROCHLORIDE 10 MG/1
10 TABLET ORAL NIGHTLY
Qty: 90 TABLET | Refills: 0 | Status: SHIPPED | OUTPATIENT
Start: 2025-06-12

## 2025-06-20 ENCOUNTER — RESULTS FOLLOW-UP (OUTPATIENT)
Facility: CLINIC | Age: 65
End: 2025-06-20

## 2025-06-20 DIAGNOSIS — E78.2 MIXED HYPERLIPIDEMIA: ICD-10-CM

## 2025-06-20 RX ORDER — EZETIMIBE 10 MG/1
10 TABLET ORAL DAILY
Qty: 90 TABLET | Refills: 0 | Status: SHIPPED | OUTPATIENT
Start: 2025-06-20

## 2025-06-20 NOTE — TELEPHONE ENCOUNTER
Kashif coker  Reviewed labs  She is not taking zetia, was unaware  She will   Once starting will repeat labs in a few months

## 2025-07-31 RX ORDER — AMLODIPINE BESYLATE 10 MG/1
10 TABLET ORAL DAILY
Qty: 90 TABLET | Refills: 0 | Status: SHIPPED | OUTPATIENT
Start: 2025-07-31

## 2025-08-22 DIAGNOSIS — E78.2 MIXED HYPERLIPIDEMIA: Primary | ICD-10-CM
